# Patient Record
Sex: MALE | Race: BLACK OR AFRICAN AMERICAN | URBAN - METROPOLITAN AREA
[De-identification: names, ages, dates, MRNs, and addresses within clinical notes are randomized per-mention and may not be internally consistent; named-entity substitution may affect disease eponyms.]

---

## 2017-02-24 ENCOUNTER — INPATIENT (INPATIENT)
Facility: HOSPITAL | Age: 82
LOS: 5 days | Discharge: HOME | End: 2017-03-02
Attending: INTERNAL MEDICINE | Admitting: INTERNAL MEDICINE
Payer: MEDICARE

## 2017-02-24 VITALS
SYSTOLIC BLOOD PRESSURE: 128 MMHG | HEART RATE: 84 BPM | TEMPERATURE: 98 F | OXYGEN SATURATION: 100 % | RESPIRATION RATE: 18 BRPM | DIASTOLIC BLOOD PRESSURE: 83 MMHG

## 2017-02-24 DIAGNOSIS — R07.9 CHEST PAIN, UNSPECIFIED: ICD-10-CM

## 2017-02-24 LAB
ALBUMIN SERPL ELPH-MCNC: 3.7 G/DL — SIGNIFICANT CHANGE UP (ref 3.3–5)
ALP SERPL-CCNC: 63 U/L — SIGNIFICANT CHANGE UP (ref 40–120)
ALT FLD-CCNC: 12 U/L — SIGNIFICANT CHANGE UP (ref 4–41)
ANISOCYTOSIS BLD QL: SLIGHT — SIGNIFICANT CHANGE UP
APPEARANCE UR: CLEAR — SIGNIFICANT CHANGE UP
AST SERPL-CCNC: 20 U/L — SIGNIFICANT CHANGE UP (ref 4–40)
BASE EXCESS BLDV CALC-SCNC: -0.7 MMOL/L — SIGNIFICANT CHANGE UP
BASOPHILS # BLD AUTO: 0.03 K/UL — SIGNIFICANT CHANGE UP (ref 0–0.2)
BASOPHILS NFR BLD AUTO: 0.6 % — SIGNIFICANT CHANGE UP (ref 0–2)
BILIRUB SERPL-MCNC: 0.3 MG/DL — SIGNIFICANT CHANGE UP (ref 0.2–1.2)
BILIRUB UR-MCNC: NEGATIVE — SIGNIFICANT CHANGE UP
BLOOD GAS VENOUS - CREATININE: 2.31 MG/DL — HIGH (ref 0.5–1.3)
BLOOD UR QL VISUAL: NEGATIVE — SIGNIFICANT CHANGE UP
BUN SERPL-MCNC: 29 MG/DL — HIGH (ref 7–23)
CALCIUM SERPL-MCNC: 8.5 MG/DL — SIGNIFICANT CHANGE UP (ref 8.4–10.5)
CHLORIDE BLDV-SCNC: 109 MMOL/L — HIGH (ref 96–108)
CHLORIDE SERPL-SCNC: 103 MMOL/L — SIGNIFICANT CHANGE UP (ref 98–107)
CK MB BLD-MCNC: 1.3 — SIGNIFICANT CHANGE UP (ref 0–2.5)
CK MB BLD-MCNC: 3.31 NG/ML — SIGNIFICANT CHANGE UP (ref 1–6.6)
CK SERPL-CCNC: 250 U/L — HIGH (ref 30–200)
CO2 SERPL-SCNC: 18 MMOL/L — LOW (ref 22–31)
COLOR SPEC: SIGNIFICANT CHANGE UP
CREAT SERPL-MCNC: 2.22 MG/DL — HIGH (ref 0.5–1.3)
EOSINOPHIL # BLD AUTO: 0.29 K/UL — SIGNIFICANT CHANGE UP (ref 0–0.5)
EOSINOPHIL NFR BLD AUTO: 6.3 % — HIGH (ref 0–6)
GAS PNL BLDV: 139 MMOL/L — SIGNIFICANT CHANGE UP (ref 136–146)
GIANT PLATELETS BLD QL SMEAR: PRESENT — SIGNIFICANT CHANGE UP
GLUCOSE BLDV-MCNC: 110 — HIGH (ref 70–99)
GLUCOSE SERPL-MCNC: 114 MG/DL — HIGH (ref 70–99)
GLUCOSE UR-MCNC: NEGATIVE — SIGNIFICANT CHANGE UP
HCO3 BLDV-SCNC: 23 MMOL/L — SIGNIFICANT CHANGE UP (ref 20–27)
HCT VFR BLD CALC: 33.4 % — LOW (ref 39–50)
HCT VFR BLDV CALC: 35.5 % — LOW (ref 39–51)
HGB BLD-MCNC: 11.1 G/DL — LOW (ref 13–17)
HGB BLDV-MCNC: 11.5 G/DL — LOW (ref 13–17)
IMM GRANULOCYTES NFR BLD AUTO: 0 % — SIGNIFICANT CHANGE UP (ref 0–1.5)
KETONES UR-MCNC: NEGATIVE — SIGNIFICANT CHANGE UP
LACTATE BLDV-MCNC: 2 MMOL/L — SIGNIFICANT CHANGE UP (ref 0.5–2)
LEUKOCYTE ESTERASE UR-ACNC: HIGH
LYMPHOCYTES # BLD AUTO: 1.62 K/UL — SIGNIFICANT CHANGE UP (ref 1–3.3)
LYMPHOCYTES # BLD AUTO: 34.9 % — SIGNIFICANT CHANGE UP (ref 13–44)
MANUAL SMEAR VERIFICATION: YES — SIGNIFICANT CHANGE UP
MCHC RBC-ENTMCNC: 26.5 PG — LOW (ref 27–34)
MCHC RBC-ENTMCNC: 33.2 % — SIGNIFICANT CHANGE UP (ref 32–36)
MCV RBC AUTO: 79.7 FL — LOW (ref 80–100)
MICROCYTES BLD QL: SLIGHT — SIGNIFICANT CHANGE UP
MONOCYTES # BLD AUTO: 0.49 K/UL — SIGNIFICANT CHANGE UP (ref 0–0.9)
MONOCYTES NFR BLD AUTO: 10.6 % — SIGNIFICANT CHANGE UP (ref 2–14)
MUCOUS THREADS # UR AUTO: SIGNIFICANT CHANGE UP
NEUTROPHILS # BLD AUTO: 2.21 K/UL — SIGNIFICANT CHANGE UP (ref 1.8–7.4)
NEUTROPHILS NFR BLD AUTO: 47.6 % — SIGNIFICANT CHANGE UP (ref 43–77)
NITRITE UR-MCNC: NEGATIVE — SIGNIFICANT CHANGE UP
NON-SQ EPI CELLS # UR AUTO: <1 — SIGNIFICANT CHANGE UP
PCO2 BLDV: 45 MMHG — SIGNIFICANT CHANGE UP (ref 41–51)
PH BLDV: 7.35 PH — SIGNIFICANT CHANGE UP (ref 7.32–7.43)
PH UR: 7.5 — SIGNIFICANT CHANGE UP (ref 4.6–8)
PLATELET # BLD AUTO: 123 K/UL — LOW (ref 150–400)
PLATELET COUNT - ESTIMATE: SIGNIFICANT CHANGE UP
PMV BLD: 12 FL — SIGNIFICANT CHANGE UP (ref 7–13)
PO2 BLDV: 40 MMHG — SIGNIFICANT CHANGE UP (ref 35–40)
POTASSIUM BLDV-SCNC: 4 MMOL/L — SIGNIFICANT CHANGE UP (ref 3.4–4.5)
POTASSIUM SERPL-MCNC: 4.6 MMOL/L — SIGNIFICANT CHANGE UP (ref 3.5–5.3)
POTASSIUM SERPL-SCNC: 4.6 MMOL/L — SIGNIFICANT CHANGE UP (ref 3.5–5.3)
PROT SERPL-MCNC: 7 G/DL — SIGNIFICANT CHANGE UP (ref 6–8.3)
PROT UR-MCNC: 10 — SIGNIFICANT CHANGE UP
RBC # BLD: 4.19 M/UL — LOW (ref 4.2–5.8)
RBC # FLD: 15.3 % — HIGH (ref 10.3–14.5)
RBC CASTS # UR COMP ASSIST: SIGNIFICANT CHANGE UP (ref 0–?)
SAO2 % BLDV: 65.2 % — SIGNIFICANT CHANGE UP (ref 60–85)
SODIUM SERPL-SCNC: 141 MMOL/L — SIGNIFICANT CHANGE UP (ref 135–145)
SP GR SPEC: 1.01 — SIGNIFICANT CHANGE UP (ref 1–1.03)
SQUAMOUS # UR AUTO: SIGNIFICANT CHANGE UP
TROPONIN T SERPL-MCNC: < 0.06 NG/ML — SIGNIFICANT CHANGE UP (ref 0–0.06)
UROBILINOGEN FLD QL: NORMAL E.U. — SIGNIFICANT CHANGE UP (ref 0.1–0.2)
WBC # BLD: 4.64 K/UL — SIGNIFICANT CHANGE UP (ref 3.8–10.5)
WBC # FLD AUTO: 4.64 K/UL — SIGNIFICANT CHANGE UP (ref 3.8–10.5)
WBC UR QL: >50 — HIGH (ref 0–?)

## 2017-02-24 PROCEDURE — 71010: CPT | Mod: 26

## 2017-02-24 NOTE — H&P ADULT. - NEGATIVE GENERAL GENITOURINARY SYMPTOMS
no dysuria/no hematuria/no incontinence no hematuria/no dysuria/no renal colic/no flank pain L/no flank pain R

## 2017-02-24 NOTE — ED PROVIDER NOTE - ATTENDING CONTRIBUTION TO CARE
Dr. Braxton:  I have personally performed a face to face bedside history and physical examination of this patient. I have discussed the history, examination, review of systems, assessment and plan of management with the resident. I have reviewed the electronic medical record and amended it to reflect my history, review of systems, physical exam, assessment and plan.    84M h/o HTN, CVA, CKD, presents with substernal chest pain that started at rest around 1pm.  Pain has been intermittent since then, not exertionally related.  Denies fever/chills, sob, n/v/d, diaphoresis.    Exam;  - nad  - rrr   -ctab  - abd soft, ntnd  - +pedal edema bilaterally, LLE>RLE (chronic)    A/P  - CP, atypical, consider ACS  - cbc, cmp, trop  - ekg  - cxr

## 2017-02-24 NOTE — H&P ADULT. - NEGATIVE ENMT SYMPTOMS
no hearing difficulty no tinnitus/no vertigo/no nasal congestion/no nasal discharge/no ear pain/no hearing difficulty/no sinus symptoms

## 2017-02-24 NOTE — H&P ADULT. - NEGATIVE CARDIOVASCULAR SYMPTOMS
no claudication/no dyspnea on exertion/no orthopnea/no palpitations/no paroxysmal nocturnal dyspnea no orthopnea/no paroxysmal nocturnal dyspnea/no dyspnea on exertion/no palpitations

## 2017-02-24 NOTE — H&P ADULT. - EYES
detailed exam unable to open eyes fully, recent surgery conjunctiva clear/unable to open eyes fully, recent surgery

## 2017-02-24 NOTE — ED PROVIDER NOTE - PROGRESS NOTE DETAILS
Dr. Kory Brooks requests that patient be admitted for stress test and ACS r/o. Will admit to tele doc of the day.

## 2017-02-24 NOTE — ED ADULT TRIAGE NOTE - CHIEF COMPLAINT QUOTE
Pt from home c/o chest pain, denies SOB/dizziness.  As per son pt was diagnosed with UTI yesterday and placed on antibiotic.  20G to left hand placed by EMS, 162mg ASA given by EMS

## 2017-02-24 NOTE — ED ADULT NURSE NOTE - OBJECTIVE STATEMENT
Aida RN: Patient received to room 15, Aox4 and non ambulatory at baseline because of previous stroke. Left sided residual weakness at baseline.. C/o CP, and SOB. States he was dx with UTI yesterday. States he is supposed to be going for a pulmonary test soon. Deq7=514% on room air. Breathing even and nonlabored. HR = 73 NSR. IV 20 gauge started in right ac. labs drawn and sent. Report given to Carolyn CORRIGAN.

## 2017-02-24 NOTE — H&P ADULT. - RS GEN PE MLT RESP DETAILS PC
chest wall tenderness/clear to auscultation bilaterally/normal/respirations non-labored/good air movement/breath sounds equal/left sided/airway patent airway patent/respirations non-labored/normal/breath sounds equal/no wheezes/no rales/chest wall tenderness/left sided/no intercostal retractions/clear to auscultation bilaterally/no rhonchi/good air movement

## 2017-02-24 NOTE — H&P ADULT. - GASTROINTESTINAL DETAILS
no distention/nontender/soft/normal nontender/no distention/bowel sounds normal/no guarding/soft/no rebound tenderness/no rigidity/normal

## 2017-02-24 NOTE — H&P ADULT. - MUSCULOSKELETAL
details… detailed exam no joint swelling/normal/normal strength/ROM intact no joint warmth/no joint swelling/no joint erythema/calf tenderness

## 2017-02-24 NOTE — H&P ADULT. - ASSESSMENT
85 y/o M with PMH CKD, HTN, CVA presenting with chest pain x 2 days. R/O ACS 85 y/o M with PMH of CVA, CKD, HTN, GERD, BPH, Intractable Hiccups presented with the complaint of left sided chest pain. R/o ACS

## 2017-02-24 NOTE — ED PROVIDER NOTE - PMH
Anxiety    Back Pain    CKD (chronic kidney disease)    CVA (cerebral infarction)    GERD (Gastroesophageal Reflux Disease)    Hypertension    Intractable Hiccups

## 2017-02-24 NOTE — H&P ADULT. - NEGATIVE MUSCULOSKELETAL SYMPTOMS
no joint swelling/no arthralgia/no arthritis/no arm pain L/no arm pain R no muscle cramps/no arthralgia/no joint swelling/no stiffness/no neck pain/no arthritis/no muscle weakness/no myalgia

## 2017-02-24 NOTE — H&P ADULT. - NEGATIVE GENERAL SYMPTOMS
no fever/no sweating/no chills/no weight loss/no anorexia no weight gain/no sweating/no chills/no anorexia/no fever/no fatigue/no malaise/no weight loss

## 2017-02-24 NOTE — H&P ADULT. - NEGATIVE NEUROLOGICAL SYMPTOMS
no syncope/no loss of consciousness/no weakness/no transient paralysis/no hemiparesis no vertigo/no generalized seizures/no confusion/no paresthesias/no weakness/no syncope/no transient paralysis/no focal seizures/no loss of consciousness/no loss of sensation/no difficulty walking/no tremors/no headache/no hemiparesis

## 2017-02-24 NOTE — H&P ADULT. - NEGATIVE OPHTHALMOLOGIC SYMPTOMS
no diplopia/no photophobia/no blurred vision L/no blurred vision R no blurred vision R/no blurred vision L/no discharge L/no lacrimation L/no lacrimation R/no diplopia/no photophobia/no discharge R

## 2017-02-24 NOTE — H&P ADULT. - PROBLEM SELECTOR PLAN 2
Patient was started on Bactrim 2/23 by his urologist outpatient   UA revealed Large leuks, negative nitrites   Will continue with Bactrim BID for 5 days   Urine cultures ordered

## 2017-02-24 NOTE — H&P ADULT. - HISTORY OF PRESENT ILLNESS
85 y/o M with PMH CKD, HTN, CVA presenting with chest pain x 2 days. Pt states he was sitting in his wheelchair yesterday when he felt 7/10,left sided chest pressure that did not radiate. He states the pain lasted 10-15 minutes and was intermittent throughout the day. Overnight, his chest pain became acutely worse and he decided to come to the ED. He states the pain is not pleuritic, exertional, or positional. He denies SOB, JAMESON, diaphoresis, fever, chills, N/V/D, palpitations. 85 y/o M with PMH of CVA, CKD, HTN, GERD, BPH, Intractable Hiccups presented with the complaint of left sided chest pain. As per the patient he developed left sided chest pain on Thursday while he was resting. The chest pain came on gradually, located on the left side, characterized as a pressure like sensation, without any aggravating or alleviating factors, with radiation of the chest pain to the left shoulder, with a severity of 4/10. Patient stated that he had chest pain before but this was not that severe. Patient denied any fevers, SOB, N/V/D/C, abdominal pain, cough, dysuria, melena, hematochezia, recent travel, sick contact, dysuria, pleuritic or positional chest pain. Of note patient was seen by his urologist on 2/23 and was diagnosed with a UTI and was started on antibiotics.     On ED admission EKG revealed NSR at a rate of 84, CE x 1: Negative, H&H: 11.1/33.4, BUN/Cr: 29/2.22, Gluc: 114, UA: Large leuks, negative nitrites. Prelim CXR: No urgent/emergent findings. When examined patient is resting in the stretcher and denied any current CP or SOB.

## 2017-02-24 NOTE — H&P ADULT. - NEUROLOGICAL DETAILS
alert and oriented x 3/normal strength alert and oriented x 3/sensation intact/responds to verbal commands

## 2017-02-24 NOTE — H&P ADULT. - NEGATIVE GASTROINTESTINAL SYMPTOMS
no vomiting/no constipation/no nausea/no melena/no diarrhea/no abdominal pain no hematochezia/no constipation/no vomiting/no melena/no nausea/no abdominal pain/no diarrhea/no change in bowel habits

## 2017-02-24 NOTE — ED PROVIDER NOTE - FAMILY HISTORY
Father  Still living? Unknown  Family history of cancer, Age at diagnosis: Age Unknown     Sibling  Still living? Unknown  Family history of cancer, Age at diagnosis: Age Unknown

## 2017-02-24 NOTE — ED PROVIDER NOTE - MEDICAL DECISION MAKING DETAILS
84 yom PMH CVA, CKD, and HTN p/w nonexertional substernal CP. Pt reports that CP has almost completely resolved. EKG not significantly changed from prior. Will obtain Sandra and CXR. Will call pt's PCP/Cardiologist Dr. Kory Brooks.

## 2017-02-24 NOTE — ED PROVIDER NOTE - OBJECTIVE STATEMENT
Pt is an 84 yom PMH CVA, CKD, and HTN p/w HTN. Pt was diagnosed with a UTI yesterday and was started on antibiotics. Pt is an 84 yom PMH CVA, CKD, and HTN p/w CP. The pain started around 1 pm while he was sitting in his wheelchair, was located on the left side of the chest, and radiated to his L upper arm. No SOB or diaphoresis. Pt was seen by his Urologist yesterday diagnosed with a UTI yesterday and was started on antibiotics.    PCP/Cardiologist: Dr. Kory Brooks Pt is an 84 yom PMH CVA, CKD, and HTN p/w CP. The pain started around 1 pm while he was sitting in his wheelchair, was located on the left side of the chest, and radiated to his L upper arm. No SOB or diaphoresis. Pt reports that his CP has almost completely resolved currently.    Pt was seen by his Urologist yesterday diagnosed with a UTI yesterday and was started on antibiotics.    PCP/Cardiologist: Dr. Kory Brooks

## 2017-02-25 DIAGNOSIS — I10 ESSENTIAL (PRIMARY) HYPERTENSION: ICD-10-CM

## 2017-02-25 DIAGNOSIS — R07.89 OTHER CHEST PAIN: ICD-10-CM

## 2017-02-25 DIAGNOSIS — E78.5 HYPERLIPIDEMIA, UNSPECIFIED: ICD-10-CM

## 2017-02-25 DIAGNOSIS — Z41.8 ENCOUNTER FOR OTHER PROCEDURES FOR PURPOSES OTHER THAN REMEDYING HEALTH STATE: ICD-10-CM

## 2017-02-25 DIAGNOSIS — N39.0 URINARY TRACT INFECTION, SITE NOT SPECIFIED: ICD-10-CM

## 2017-02-25 DIAGNOSIS — N40.0 BENIGN PROSTATIC HYPERPLASIA WITHOUT LOWER URINARY TRACT SYMPTOMS: ICD-10-CM

## 2017-02-25 DIAGNOSIS — H40.9 UNSPECIFIED GLAUCOMA: ICD-10-CM

## 2017-02-25 LAB
ALBUMIN SERPL ELPH-MCNC: 3.6 G/DL — SIGNIFICANT CHANGE UP (ref 3.3–5)
ALP SERPL-CCNC: 59 U/L — SIGNIFICANT CHANGE UP (ref 40–120)
ALT FLD-CCNC: 15 U/L — SIGNIFICANT CHANGE UP (ref 4–41)
AST SERPL-CCNC: 19 U/L — SIGNIFICANT CHANGE UP (ref 4–40)
BILIRUB SERPL-MCNC: 0.6 MG/DL — SIGNIFICANT CHANGE UP (ref 0.2–1.2)
BUN SERPL-MCNC: 22 MG/DL — SIGNIFICANT CHANGE UP (ref 7–23)
CALCIUM SERPL-MCNC: 8.8 MG/DL — SIGNIFICANT CHANGE UP (ref 8.4–10.5)
CHLORIDE SERPL-SCNC: 107 MMOL/L — SIGNIFICANT CHANGE UP (ref 98–107)
CHOLEST SERPL-MCNC: 197 MG/DL — SIGNIFICANT CHANGE UP (ref 120–199)
CK MB BLD-MCNC: 1.4 — SIGNIFICANT CHANGE UP (ref 0–2.5)
CK MB BLD-MCNC: 3.77 NG/ML — SIGNIFICANT CHANGE UP (ref 1–6.6)
CK SERPL-CCNC: 270 U/L — HIGH (ref 30–200)
CO2 SERPL-SCNC: 20 MMOL/L — LOW (ref 22–31)
CREAT SERPL-MCNC: 2.05 MG/DL — HIGH (ref 0.5–1.3)
D DIMER BLD IA.RAPID-MCNC: 597 NG/ML — SIGNIFICANT CHANGE UP
GLUCOSE SERPL-MCNC: 74 MG/DL — SIGNIFICANT CHANGE UP (ref 70–99)
HBA1C BLD-MCNC: 6.1 % — HIGH (ref 4–5.6)
HCT VFR BLD CALC: 31.5 % — LOW (ref 39–50)
HDLC SERPL-MCNC: 69 MG/DL — HIGH (ref 35–55)
HGB BLD-MCNC: 10.6 G/DL — LOW (ref 13–17)
LIPID PNL WITH DIRECT LDL SERPL: 126 MG/DL — SIGNIFICANT CHANGE UP
MAGNESIUM SERPL-MCNC: 2.1 MG/DL — SIGNIFICANT CHANGE UP (ref 1.6–2.6)
MCHC RBC-ENTMCNC: 26.5 PG — LOW (ref 27–34)
MCHC RBC-ENTMCNC: 33.7 % — SIGNIFICANT CHANGE UP (ref 32–36)
MCV RBC AUTO: 78.8 FL — LOW (ref 80–100)
PHOSPHATE SERPL-MCNC: 2.9 MG/DL — SIGNIFICANT CHANGE UP (ref 2.5–4.5)
PLATELET # BLD AUTO: 115 K/UL — LOW (ref 150–400)
PMV BLD: 11.5 FL — SIGNIFICANT CHANGE UP (ref 7–13)
POTASSIUM SERPL-MCNC: 4.3 MMOL/L — SIGNIFICANT CHANGE UP (ref 3.5–5.3)
POTASSIUM SERPL-SCNC: 4.3 MMOL/L — SIGNIFICANT CHANGE UP (ref 3.5–5.3)
PROT SERPL-MCNC: 6.7 G/DL — SIGNIFICANT CHANGE UP (ref 6–8.3)
RBC # BLD: 4 M/UL — LOW (ref 4.2–5.8)
RBC # FLD: 15.3 % — HIGH (ref 10.3–14.5)
SODIUM SERPL-SCNC: 140 MMOL/L — SIGNIFICANT CHANGE UP (ref 135–145)
TRIGL SERPL-MCNC: 48 MG/DL — SIGNIFICANT CHANGE UP (ref 10–149)
TROPONIN T SERPL-MCNC: < 0.06 NG/ML — SIGNIFICANT CHANGE UP (ref 0–0.06)
TSH SERPL-MCNC: 1.71 UIU/ML — SIGNIFICANT CHANGE UP (ref 0.27–4.2)
WBC # BLD: 4.93 K/UL — SIGNIFICANT CHANGE UP (ref 3.8–10.5)
WBC # FLD AUTO: 4.93 K/UL — SIGNIFICANT CHANGE UP (ref 3.8–10.5)

## 2017-02-25 RX ORDER — PANTOPRAZOLE SODIUM 20 MG/1
40 TABLET, DELAYED RELEASE ORAL
Qty: 0 | Refills: 0 | Status: DISCONTINUED | OUTPATIENT
Start: 2017-02-25 | End: 2017-03-02

## 2017-02-25 RX ORDER — SUCRALFATE 1 G
1 TABLET ORAL DAILY
Qty: 0 | Refills: 0 | Status: DISCONTINUED | OUTPATIENT
Start: 2017-02-25 | End: 2017-03-02

## 2017-02-25 RX ORDER — FUROSEMIDE 40 MG
40 TABLET ORAL DAILY
Qty: 0 | Refills: 0 | Status: DISCONTINUED | OUTPATIENT
Start: 2017-02-25 | End: 2017-02-28

## 2017-02-25 RX ORDER — LATANOPROST 0.05 MG/ML
1 SOLUTION/ DROPS OPHTHALMIC; TOPICAL AT BEDTIME
Qty: 0 | Refills: 0 | Status: DISCONTINUED | OUTPATIENT
Start: 2017-02-25 | End: 2017-03-02

## 2017-02-25 RX ORDER — INFLUENZA VIRUS VACCINE 15; 15; 15; 15 UG/.5ML; UG/.5ML; UG/.5ML; UG/.5ML
0.5 SUSPENSION INTRAMUSCULAR ONCE
Qty: 0 | Refills: 0 | Status: DISCONTINUED | OUTPATIENT
Start: 2017-02-25 | End: 2017-03-02

## 2017-02-25 RX ORDER — ASPIRIN/CALCIUM CARB/MAGNESIUM 324 MG
81 TABLET ORAL DAILY
Qty: 0 | Refills: 0 | Status: DISCONTINUED | OUTPATIENT
Start: 2017-02-25 | End: 2017-03-02

## 2017-02-25 RX ORDER — TAMSULOSIN HYDROCHLORIDE 0.4 MG/1
0.4 CAPSULE ORAL AT BEDTIME
Qty: 0 | Refills: 0 | Status: DISCONTINUED | OUTPATIENT
Start: 2017-02-25 | End: 2017-03-02

## 2017-02-25 RX ORDER — HEPARIN SODIUM 5000 [USP'U]/ML
5000 INJECTION INTRAVENOUS; SUBCUTANEOUS EVERY 12 HOURS
Qty: 0 | Refills: 0 | Status: DISCONTINUED | OUTPATIENT
Start: 2017-02-25 | End: 2017-03-02

## 2017-02-25 RX ORDER — CEFTRIAXONE 500 MG/1
1 INJECTION, POWDER, FOR SOLUTION INTRAMUSCULAR; INTRAVENOUS EVERY 24 HOURS
Qty: 0 | Refills: 0 | Status: COMPLETED | OUTPATIENT
Start: 2017-02-25 | End: 2017-02-27

## 2017-02-25 RX ORDER — PREGABALIN 225 MG/1
100 CAPSULE ORAL DAILY
Qty: 0 | Refills: 0 | Status: DISCONTINUED | OUTPATIENT
Start: 2017-02-25 | End: 2017-03-02

## 2017-02-25 RX ORDER — FINASTERIDE 5 MG/1
5 TABLET, FILM COATED ORAL DAILY
Qty: 0 | Refills: 0 | Status: DISCONTINUED | OUTPATIENT
Start: 2017-02-25 | End: 2017-03-02

## 2017-02-25 RX ORDER — ATORVASTATIN CALCIUM 80 MG/1
20 TABLET, FILM COATED ORAL AT BEDTIME
Qty: 0 | Refills: 0 | Status: DISCONTINUED | OUTPATIENT
Start: 2017-02-25 | End: 2017-03-02

## 2017-02-25 RX ADMIN — PANTOPRAZOLE SODIUM 40 MILLIGRAM(S): 20 TABLET, DELAYED RELEASE ORAL at 06:05

## 2017-02-25 RX ADMIN — ATORVASTATIN CALCIUM 20 MILLIGRAM(S): 80 TABLET, FILM COATED ORAL at 21:37

## 2017-02-25 RX ADMIN — Medication 1 GRAM(S): at 12:15

## 2017-02-25 RX ADMIN — HEPARIN SODIUM 5000 UNIT(S): 5000 INJECTION INTRAVENOUS; SUBCUTANEOUS at 06:05

## 2017-02-25 RX ADMIN — FINASTERIDE 5 MILLIGRAM(S): 5 TABLET, FILM COATED ORAL at 12:15

## 2017-02-25 RX ADMIN — Medication 1 TABLET(S): at 06:05

## 2017-02-25 RX ADMIN — Medication 40 MILLIGRAM(S): at 06:05

## 2017-02-25 RX ADMIN — Medication 30 MILLILITER(S): at 01:46

## 2017-02-25 RX ADMIN — TAMSULOSIN HYDROCHLORIDE 0.4 MILLIGRAM(S): 0.4 CAPSULE ORAL at 21:37

## 2017-02-25 RX ADMIN — LATANOPROST 1 DROP(S): 0.05 SOLUTION/ DROPS OPHTHALMIC; TOPICAL at 22:24

## 2017-02-25 RX ADMIN — PREGABALIN 100 MICROGRAM(S): 225 CAPSULE ORAL at 12:15

## 2017-02-25 RX ADMIN — HEPARIN SODIUM 5000 UNIT(S): 5000 INJECTION INTRAVENOUS; SUBCUTANEOUS at 18:12

## 2017-02-25 RX ADMIN — Medication 1 TABLET(S): at 18:12

## 2017-02-25 RX ADMIN — Medication 81 MILLIGRAM(S): at 12:15

## 2017-02-25 RX ADMIN — CEFTRIAXONE 100 GRAM(S): 500 INJECTION, POWDER, FOR SOLUTION INTRAMUSCULAR; INTRAVENOUS at 18:35

## 2017-02-25 NOTE — SWALLOW BEDSIDE ASSESSMENT ADULT - SWALLOW EVAL: RECOMMENDED FEEDING/EATING TECHNIQUES
alternate food with liquid/allow for swallow between intakes/position upright (90 degrees)/no straws/check mouth frequently for oral residue/pocketing/small sips/bites/maintain upright posture during/after eating for 30 mins/oral hygiene

## 2017-02-25 NOTE — SWALLOW BEDSIDE ASSESSMENT ADULT - SWALLOW EVAL: DIAGNOSIS
1- pt demonstrated functional oral management with puree, honey thick liquid, nectar thick liquid and thin liquid textures marked by adequate bolus collection, transfer and transport. 2- pt demonstrated mild oral dysphagia with solid textures marked by decreased mastication abilities resulting in delayed oral preparation/AP transit. 3- pt demonstrated mild pharyngeal dysphagia with regular/solid, puree, honey thick liquid, nectar thick liquid and thin liquid textures marked by mildly delayed swallow trigger and mildly reduced hyolaryngeal excursion without any overt s/s of penetration/aspiration noted.

## 2017-02-25 NOTE — SWALLOW BEDSIDE ASSESSMENT ADULT - COMMENTS
Pt was alert and cooperative for a clinical assessment of swallow function this PM. Pt denies any history of dysphagia at the time of today's consultation. Chart review revealed a CXR completed on 2/24/2017 with findings of "no focal consolidations".

## 2017-02-26 LAB
BACTERIA UR CULT: SIGNIFICANT CHANGE UP
BUN SERPL-MCNC: 20 MG/DL — SIGNIFICANT CHANGE UP (ref 7–23)
CALCIUM SERPL-MCNC: 9.3 MG/DL — SIGNIFICANT CHANGE UP (ref 8.4–10.5)
CHLORIDE SERPL-SCNC: 106 MMOL/L — SIGNIFICANT CHANGE UP (ref 98–107)
CO2 SERPL-SCNC: 20 MMOL/L — LOW (ref 22–31)
CREAT SERPL-MCNC: 2.31 MG/DL — HIGH (ref 0.5–1.3)
GLUCOSE SERPL-MCNC: 93 MG/DL — SIGNIFICANT CHANGE UP (ref 70–99)
HCT VFR BLD CALC: 33.6 % — LOW (ref 39–50)
HGB BLD-MCNC: 11.5 G/DL — LOW (ref 13–17)
MCHC RBC-ENTMCNC: 27.3 PG — SIGNIFICANT CHANGE UP (ref 27–34)
MCHC RBC-ENTMCNC: 34.2 % — SIGNIFICANT CHANGE UP (ref 32–36)
MCV RBC AUTO: 79.8 FL — LOW (ref 80–100)
PLATELET # BLD AUTO: 128 K/UL — LOW (ref 150–400)
PMV BLD: 11.5 FL — SIGNIFICANT CHANGE UP (ref 7–13)
POTASSIUM SERPL-MCNC: 4.1 MMOL/L — SIGNIFICANT CHANGE UP (ref 3.5–5.3)
POTASSIUM SERPL-SCNC: 4.1 MMOL/L — SIGNIFICANT CHANGE UP (ref 3.5–5.3)
RBC # BLD: 4.21 M/UL — SIGNIFICANT CHANGE UP (ref 4.2–5.8)
RBC # FLD: 15.3 % — HIGH (ref 10.3–14.5)
SODIUM SERPL-SCNC: 141 MMOL/L — SIGNIFICANT CHANGE UP (ref 135–145)
SPECIMEN SOURCE: SIGNIFICANT CHANGE UP
WBC # BLD: 5.08 K/UL — SIGNIFICANT CHANGE UP (ref 3.8–10.5)
WBC # FLD AUTO: 5.08 K/UL — SIGNIFICANT CHANGE UP (ref 3.8–10.5)

## 2017-02-26 RX ADMIN — Medication 81 MILLIGRAM(S): at 13:58

## 2017-02-26 RX ADMIN — TAMSULOSIN HYDROCHLORIDE 0.4 MILLIGRAM(S): 0.4 CAPSULE ORAL at 22:15

## 2017-02-26 RX ADMIN — Medication 40 MILLIGRAM(S): at 05:38

## 2017-02-26 RX ADMIN — PANTOPRAZOLE SODIUM 40 MILLIGRAM(S): 20 TABLET, DELAYED RELEASE ORAL at 05:38

## 2017-02-26 RX ADMIN — Medication 1 GRAM(S): at 13:58

## 2017-02-26 RX ADMIN — CEFTRIAXONE 100 GRAM(S): 500 INJECTION, POWDER, FOR SOLUTION INTRAMUSCULAR; INTRAVENOUS at 18:53

## 2017-02-26 RX ADMIN — ATORVASTATIN CALCIUM 20 MILLIGRAM(S): 80 TABLET, FILM COATED ORAL at 22:16

## 2017-02-26 RX ADMIN — FINASTERIDE 5 MILLIGRAM(S): 5 TABLET, FILM COATED ORAL at 13:58

## 2017-02-26 RX ADMIN — HEPARIN SODIUM 5000 UNIT(S): 5000 INJECTION INTRAVENOUS; SUBCUTANEOUS at 18:54

## 2017-02-26 RX ADMIN — LATANOPROST 1 DROP(S): 0.05 SOLUTION/ DROPS OPHTHALMIC; TOPICAL at 22:15

## 2017-02-26 RX ADMIN — HEPARIN SODIUM 5000 UNIT(S): 5000 INJECTION INTRAVENOUS; SUBCUTANEOUS at 05:38

## 2017-02-26 RX ADMIN — PREGABALIN 100 MICROGRAM(S): 225 CAPSULE ORAL at 13:58

## 2017-02-27 ENCOUNTER — TRANSCRIPTION ENCOUNTER (OUTPATIENT)
Age: 82
End: 2017-02-27

## 2017-02-27 LAB
BUN SERPL-MCNC: 23 MG/DL — SIGNIFICANT CHANGE UP (ref 7–23)
CALCIUM SERPL-MCNC: 9.5 MG/DL — SIGNIFICANT CHANGE UP (ref 8.4–10.5)
CHLORIDE SERPL-SCNC: 103 MMOL/L — SIGNIFICANT CHANGE UP (ref 98–107)
CO2 SERPL-SCNC: 22 MMOL/L — SIGNIFICANT CHANGE UP (ref 22–31)
CREAT ?TM UR-MCNC: 113.57 MG/DL — SIGNIFICANT CHANGE UP
CREAT SERPL-MCNC: 2.56 MG/DL — HIGH (ref 0.5–1.3)
GLUCOSE SERPL-MCNC: 76 MG/DL — SIGNIFICANT CHANGE UP (ref 70–99)
HCT VFR BLD CALC: 36 % — LOW (ref 39–50)
HGB BLD-MCNC: 12.2 G/DL — LOW (ref 13–17)
MCHC RBC-ENTMCNC: 26.7 PG — LOW (ref 27–34)
MCHC RBC-ENTMCNC: 33.9 % — SIGNIFICANT CHANGE UP (ref 32–36)
MCV RBC AUTO: 78.8 FL — LOW (ref 80–100)
PLATELET # BLD AUTO: 131 K/UL — LOW (ref 150–400)
PMV BLD: 11.6 FL — SIGNIFICANT CHANGE UP (ref 7–13)
POTASSIUM SERPL-MCNC: 4.7 MMOL/L — SIGNIFICANT CHANGE UP (ref 3.5–5.3)
POTASSIUM SERPL-SCNC: 4.7 MMOL/L — SIGNIFICANT CHANGE UP (ref 3.5–5.3)
RBC # BLD: 4.57 M/UL — SIGNIFICANT CHANGE UP (ref 4.2–5.8)
RBC # FLD: 15.3 % — HIGH (ref 10.3–14.5)
SODIUM SERPL-SCNC: 142 MMOL/L — SIGNIFICANT CHANGE UP (ref 135–145)
UUN UR-MCNC: 338.5 MG/DL — SIGNIFICANT CHANGE UP
WBC # BLD: 6.11 K/UL — SIGNIFICANT CHANGE UP (ref 3.8–10.5)
WBC # FLD AUTO: 6.11 K/UL — SIGNIFICANT CHANGE UP (ref 3.8–10.5)

## 2017-02-27 PROCEDURE — 93306 TTE W/DOPPLER COMPLETE: CPT | Mod: 26

## 2017-02-27 RX ADMIN — PREGABALIN 100 MICROGRAM(S): 225 CAPSULE ORAL at 12:38

## 2017-02-27 RX ADMIN — TAMSULOSIN HYDROCHLORIDE 0.4 MILLIGRAM(S): 0.4 CAPSULE ORAL at 21:30

## 2017-02-27 RX ADMIN — Medication 1 GRAM(S): at 12:39

## 2017-02-27 RX ADMIN — PANTOPRAZOLE SODIUM 40 MILLIGRAM(S): 20 TABLET, DELAYED RELEASE ORAL at 06:31

## 2017-02-27 RX ADMIN — CEFTRIAXONE 100 GRAM(S): 500 INJECTION, POWDER, FOR SOLUTION INTRAMUSCULAR; INTRAVENOUS at 18:48

## 2017-02-27 RX ADMIN — HEPARIN SODIUM 5000 UNIT(S): 5000 INJECTION INTRAVENOUS; SUBCUTANEOUS at 06:31

## 2017-02-27 RX ADMIN — Medication 40 MILLIGRAM(S): at 06:31

## 2017-02-27 RX ADMIN — Medication 81 MILLIGRAM(S): at 12:38

## 2017-02-27 RX ADMIN — HEPARIN SODIUM 5000 UNIT(S): 5000 INJECTION INTRAVENOUS; SUBCUTANEOUS at 18:48

## 2017-02-27 RX ADMIN — ATORVASTATIN CALCIUM 20 MILLIGRAM(S): 80 TABLET, FILM COATED ORAL at 21:30

## 2017-02-27 RX ADMIN — FINASTERIDE 5 MILLIGRAM(S): 5 TABLET, FILM COATED ORAL at 12:38

## 2017-02-27 RX ADMIN — LATANOPROST 1 DROP(S): 0.05 SOLUTION/ DROPS OPHTHALMIC; TOPICAL at 21:30

## 2017-02-28 LAB
BUN SERPL-MCNC: 34 MG/DL — HIGH (ref 7–23)
CALCIUM SERPL-MCNC: 9.3 MG/DL — SIGNIFICANT CHANGE UP (ref 8.4–10.5)
CHLORIDE SERPL-SCNC: 104 MMOL/L — SIGNIFICANT CHANGE UP (ref 98–107)
CO2 SERPL-SCNC: 21 MMOL/L — LOW (ref 22–31)
CREAT SERPL-MCNC: 3.57 MG/DL — HIGH (ref 0.5–1.3)
EOSINOPHIL NFR URNS MANUAL: NEGATIVE — SIGNIFICANT CHANGE UP (ref 0–0)
GLUCOSE SERPL-MCNC: 100 MG/DL — HIGH (ref 70–99)
HCT VFR BLD CALC: 37.3 % — LOW (ref 39–50)
HGB BLD-MCNC: 12.8 G/DL — LOW (ref 13–17)
MAGNESIUM SERPL-MCNC: 2.2 MG/DL — SIGNIFICANT CHANGE UP (ref 1.6–2.6)
MCHC RBC-ENTMCNC: 27.4 PG — SIGNIFICANT CHANGE UP (ref 27–34)
MCHC RBC-ENTMCNC: 34.3 % — SIGNIFICANT CHANGE UP (ref 32–36)
MCV RBC AUTO: 79.7 FL — LOW (ref 80–100)
PHOSPHATE SERPL-MCNC: 3.7 MG/DL — SIGNIFICANT CHANGE UP (ref 2.5–4.5)
PLATELET # BLD AUTO: 147 K/UL — LOW (ref 150–400)
PMV BLD: 11.9 FL — SIGNIFICANT CHANGE UP (ref 7–13)
POTASSIUM SERPL-MCNC: 4.3 MMOL/L — SIGNIFICANT CHANGE UP (ref 3.5–5.3)
POTASSIUM SERPL-SCNC: 4.3 MMOL/L — SIGNIFICANT CHANGE UP (ref 3.5–5.3)
RBC # BLD: 4.68 M/UL — SIGNIFICANT CHANGE UP (ref 4.2–5.8)
RBC # FLD: 15.5 % — HIGH (ref 10.3–14.5)
SODIUM SERPL-SCNC: 142 MMOL/L — SIGNIFICANT CHANGE UP (ref 135–145)
WBC # BLD: 6.31 K/UL — SIGNIFICANT CHANGE UP (ref 3.8–10.5)
WBC # FLD AUTO: 6.31 K/UL — SIGNIFICANT CHANGE UP (ref 3.8–10.5)

## 2017-02-28 PROCEDURE — 76775 US EXAM ABDO BACK WALL LIM: CPT | Mod: 26

## 2017-02-28 RX ORDER — SODIUM CHLORIDE 9 MG/ML
1000 INJECTION INTRAMUSCULAR; INTRAVENOUS; SUBCUTANEOUS
Qty: 0 | Refills: 0 | Status: DISCONTINUED | OUTPATIENT
Start: 2017-02-28 | End: 2017-03-02

## 2017-02-28 RX ADMIN — HEPARIN SODIUM 5000 UNIT(S): 5000 INJECTION INTRAVENOUS; SUBCUTANEOUS at 17:06

## 2017-02-28 RX ADMIN — PREGABALIN 100 MICROGRAM(S): 225 CAPSULE ORAL at 11:22

## 2017-02-28 RX ADMIN — ATORVASTATIN CALCIUM 20 MILLIGRAM(S): 80 TABLET, FILM COATED ORAL at 21:45

## 2017-02-28 RX ADMIN — SODIUM CHLORIDE 50 MILLILITER(S): 9 INJECTION INTRAMUSCULAR; INTRAVENOUS; SUBCUTANEOUS at 13:03

## 2017-02-28 RX ADMIN — Medication 1 GRAM(S): at 11:22

## 2017-02-28 RX ADMIN — TAMSULOSIN HYDROCHLORIDE 0.4 MILLIGRAM(S): 0.4 CAPSULE ORAL at 21:46

## 2017-02-28 RX ADMIN — HEPARIN SODIUM 5000 UNIT(S): 5000 INJECTION INTRAVENOUS; SUBCUTANEOUS at 05:05

## 2017-02-28 RX ADMIN — PANTOPRAZOLE SODIUM 40 MILLIGRAM(S): 20 TABLET, DELAYED RELEASE ORAL at 05:05

## 2017-02-28 RX ADMIN — FINASTERIDE 5 MILLIGRAM(S): 5 TABLET, FILM COATED ORAL at 11:22

## 2017-02-28 RX ADMIN — LATANOPROST 1 DROP(S): 0.05 SOLUTION/ DROPS OPHTHALMIC; TOPICAL at 21:46

## 2017-02-28 RX ADMIN — Medication 81 MILLIGRAM(S): at 11:23

## 2017-02-28 RX ADMIN — Medication 40 MILLIGRAM(S): at 05:05

## 2017-02-28 NOTE — DISCHARGE NOTE ADULT - PLAN OF CARE
prevent chest pain - f/u with your cardiologist in 1 - 2 weeks prevention maintain good hygiene keep the odonnell in place at this time f/u with urologist in 1 week for possible removal of the odonnell with a triel of void.

## 2017-02-28 NOTE — DISCHARGE NOTE ADULT - HOSPITAL COURSE
83 y/o M with PMH of CVA, CKD, HTN, GERD, BPH, Intractable Hiccups presented with the complaint of left sided chest pain. R/o ACS     Hospital Course:  EKG: NSR at a rate of 84   CE x 2: Negative   H&H: 11.1/33.4  BUN/Cr: 29/2.22  Gluc: 114    A1c 6.1  UA: Large leuks, negative nitrites - completed 3 day course of ceftriaxone   Prelim CXR: No urgent/emergent findings.   D-dimer 597-no clinical evidence of PE. As per cards get NST and if negative can DC.  As per Dr. Montalvo, can cancel NST and check Echo, and get PT cs  2/26: Cardio: PT c/s, check echo, diastolic dysfunction  2/27: Renal: check urine eos, Cr, Na, f/u cardio, monitor BMP  2/27 TTE: 1. Calcified trileaflet aortic valve with normal opening.  Mild-moderate aortic regurgitation.  2. Increased relative wall thickness with normal left  ventricular mass index, consistent with concentric left  ventricular remodeling.  3. Normal left ventricular systolic function. No segmental  wall motion abnormalities.  4. Normal right ventricular size and function. 83 y/o M with PMH of CVA, CKD, HTN, GERD, BPH, Intractable Hiccups presented with the complaint of left sided chest pain. R/o ACS     Hospital Course:  EKG: NSR at a rate of 84   CE x 2: Negative   H&H: 11.1/33.4  BUN/Cr: 29/2.22  Gluc: 114    A1c 6.1  UA: Large leuks, negative nitrites - completed 3 day course of ceftriaxone   Prelim CXR: No urgent/emergent findings.   D-dimer 597-no clinical evidence of PE. As per cards get NST and if negative can DC.  As per Dr. Montalvo, can cancel NST and check Echo, and get PT cs  2/26: Cardio: PT c/s, check echo, diastolic dysfunction  2/27: Renal: check urine eos, Cr, Na, f/u cardio, monitor BMP  2/27 TTE: 1. Calcified trileaflet aortic valve with normal opening.  Mild-moderate aortic regurgitation.  2. Increased relative wall thickness with normal left  ventricular mass index, consistent with concentric left  ventricular remodeling.  3. Normal left ventricular systolic function. No segmental  wall motion abnormalities.  4. Normal right ventricular size and function.  2/28: Renal: d/c lasix, NS @ 50cc/hr x 20hrs, monitor BMP, f/u cardio  3/1: Renal us: Evidence of nonobstructing right lower pole kidney stones. Large prostate with evidence of urinary bladder outlet obstruction.  3/1: Renal: urinary retention s/p odonnell, monitor BMP, cont to hold lasix, f/u cardio, urology eval  3/1: c/w flomax/proscar, trend Cr, c/w Odonnell, outaptient TOV    pt will be going home with odonnell and f/u with urology as out patient fro a triel of void

## 2017-02-28 NOTE — DISCHARGE NOTE ADULT - PATIENT PORTAL LINK FT
“You can access the [facility] Patient Portal, offered by [facility], by registering with the following website: [email or web address]”

## 2017-02-28 NOTE — DISCHARGE NOTE ADULT - MEDICATION SUMMARY - MEDICATIONS TO TAKE
I will START or STAY ON the medications listed below when I get home from the hospital:    dutasteride 0.5 mg oral capsule  -- 1 cap(s) by mouth once a day  -- Indication: For Need for prophylactic measure    aspirin 81 mg oral tablet, chewable  -- 1 tab(s) by mouth once a day  -- Indication: For HLD (hyperlipidemia)    tamsulosin 0.4 mg oral capsule  -- 1 cap(s) by mouth once a day (at bedtime)  -- Indication: For BPH (benign prostatic hyperplasia)    atorvastatin 20 mg oral tablet  -- 1 tab(s) by mouth once a day (at bedtime)  -- Indication: For HLD (hyperlipidemia)    sucralfate 1 g oral tablet  -- 1 tab(s) by mouth once a day  -- Indication: For Gerd    Lumigan 0.01% ophthalmic solution  -- 1 drop(s) to each affected eye once a day (in the evening)  -- Indication: For Glaucoma    omeprazole 20 mg oral delayed release capsule  -- 1 cap(s) by mouth once a day  -- Indication: For Gerd    cyanocobalamin 100 mcg oral tablet  -- 1 tab(s) by mouth once a day  -- Indication: For supplements

## 2017-02-28 NOTE — DISCHARGE NOTE ADULT - CARE PLAN
Principal Discharge DX:	Atypical chest pain  Goal:	prevent chest pain  Instructions for follow-up, activity and diet:	- f/u with your cardiologist in 1 - 2 weeks  Secondary Diagnosis:	UTI (urinary tract infection)  Goal:	prevention  Instructions for follow-up, activity and diet:	maintain good hygiene  Secondary Diagnosis:	Hypertension  Secondary Diagnosis:	CKD (chronic kidney disease) Principal Discharge DX:	Atypical chest pain  Goal:	prevent chest pain  Instructions for follow-up, activity and diet:	- f/u with your cardiologist in 1 - 2 weeks  Secondary Diagnosis:	UTI (urinary tract infection)  Goal:	prevention  Instructions for follow-up, activity and diet:	maintain good hygiene  Secondary Diagnosis:	Hypertension  Secondary Diagnosis:	CKD (chronic kidney disease)  Secondary Diagnosis:	Urinary retention  Goal:	keep the odonnell in place at this time  Instructions for follow-up, activity and diet:	f/u with urologist in 1 week for possible removal of the odonnell with a triel of void.

## 2017-02-28 NOTE — DISCHARGE NOTE ADULT - MEDICATION SUMMARY - MEDICATIONS TO STOP TAKING
I will STOP taking the medications listed below when I get home from the hospital:    levoquin  -- 250 milligram(s) by mouth once a day MDD:1    Vicodin 300 mg-5 mg oral tablet  -- 1 tab(s) by mouth every 6 hours MDD:3  -- Caution federal law prohibits the transfer of this drug to any person other  than the person for whom it was prescribed.  Do not drink alcoholic beverages when taking this medication.  May cause drowsiness.  Alcohol may intensify this effect.  Use care when operating dangerous machinery.  This drug may impair the ability to drive or operate machinery.  Use care until you become familiar with its effects.  This product contains acetaminophen.  Do not use  with any other product containing acetaminophen to prevent possible liver damage.  Using more of this medication than prescribed may cause serious breathing problems.    Lasix 40 mg oral tablet  -- 1 tab(s) by mouth once a day    Bactrim  mg-160 mg oral tablet  -- 1 tab(s) by mouth 2 times a day

## 2017-02-28 NOTE — DISCHARGE NOTE ADULT - HOME CARE AGENCY
[facility] [phone] VISITING NURSE WILL CALL DAY AFTER DISCHARGE... DAVIDA SHEPHERD WILL BE REISTATED UPON DISCHARGE  FROM [facility] [phone] VISITING NURSE WILL CALL DAY AFTER DISCHARGE... DAVIDA SHEPHERD WILL BE REISTATED UPON DISCHARGE  FROM [facility] PROGRAM ([facility] [phone])

## 2017-02-28 NOTE — DISCHARGE NOTE ADULT - COMMUNITY RESOURCES
FAMILY WILL TAKE PT HOME [facility] [phone] WILL P/UP PT AT 6 PM (TRIP# [id number])

## 2017-02-28 NOTE — DISCHARGE NOTE ADULT - CARE PROVIDERS DIRECT ADDRESSES
,DirectAddress_Unknown,[email or web address],DirectAddress_Unknown ,DirectAddress_Unknown,[email or web address],DirectAddress_Unknown

## 2017-03-01 LAB
BUN SERPL-MCNC: 31 MG/DL — HIGH (ref 7–23)
CALCIUM SERPL-MCNC: 8.7 MG/DL — SIGNIFICANT CHANGE UP (ref 8.4–10.5)
CHLORIDE SERPL-SCNC: 109 MMOL/L — HIGH (ref 98–107)
CO2 SERPL-SCNC: 19 MMOL/L — LOW (ref 22–31)
CREAT SERPL-MCNC: 3.11 MG/DL — HIGH (ref 0.5–1.3)
GLUCOSE SERPL-MCNC: 86 MG/DL — SIGNIFICANT CHANGE UP (ref 70–99)
HCT VFR BLD CALC: 33.7 % — LOW (ref 39–50)
HGB BLD-MCNC: 11.5 G/DL — LOW (ref 13–17)
MAGNESIUM SERPL-MCNC: 2.2 MG/DL — SIGNIFICANT CHANGE UP (ref 1.6–2.6)
MCHC RBC-ENTMCNC: 27.1 PG — SIGNIFICANT CHANGE UP (ref 27–34)
MCHC RBC-ENTMCNC: 34.1 % — SIGNIFICANT CHANGE UP (ref 32–36)
MCV RBC AUTO: 79.3 FL — LOW (ref 80–100)
PHOSPHATE SERPL-MCNC: 3 MG/DL — SIGNIFICANT CHANGE UP (ref 2.5–4.5)
PLATELET # BLD AUTO: 135 K/UL — LOW (ref 150–400)
PMV BLD: 11.5 FL — SIGNIFICANT CHANGE UP (ref 7–13)
POTASSIUM SERPL-MCNC: 4.2 MMOL/L — SIGNIFICANT CHANGE UP (ref 3.5–5.3)
POTASSIUM SERPL-SCNC: 4.2 MMOL/L — SIGNIFICANT CHANGE UP (ref 3.5–5.3)
RBC # BLD: 4.25 M/UL — SIGNIFICANT CHANGE UP (ref 4.2–5.8)
RBC # FLD: 15.4 % — HIGH (ref 10.3–14.5)
SODIUM SERPL-SCNC: 144 MMOL/L — SIGNIFICANT CHANGE UP (ref 135–145)
WBC # BLD: 4.2 K/UL — SIGNIFICANT CHANGE UP (ref 3.8–10.5)
WBC # FLD AUTO: 4.2 K/UL — SIGNIFICANT CHANGE UP (ref 3.8–10.5)

## 2017-03-01 RX ADMIN — FINASTERIDE 5 MILLIGRAM(S): 5 TABLET, FILM COATED ORAL at 11:17

## 2017-03-01 RX ADMIN — ATORVASTATIN CALCIUM 20 MILLIGRAM(S): 80 TABLET, FILM COATED ORAL at 22:00

## 2017-03-01 RX ADMIN — HEPARIN SODIUM 5000 UNIT(S): 5000 INJECTION INTRAVENOUS; SUBCUTANEOUS at 17:01

## 2017-03-01 RX ADMIN — TAMSULOSIN HYDROCHLORIDE 0.4 MILLIGRAM(S): 0.4 CAPSULE ORAL at 22:00

## 2017-03-01 RX ADMIN — LATANOPROST 1 DROP(S): 0.05 SOLUTION/ DROPS OPHTHALMIC; TOPICAL at 22:00

## 2017-03-01 RX ADMIN — HEPARIN SODIUM 5000 UNIT(S): 5000 INJECTION INTRAVENOUS; SUBCUTANEOUS at 05:19

## 2017-03-01 RX ADMIN — PREGABALIN 100 MICROGRAM(S): 225 CAPSULE ORAL at 11:17

## 2017-03-01 RX ADMIN — Medication 81 MILLIGRAM(S): at 11:17

## 2017-03-01 RX ADMIN — Medication 1 GRAM(S): at 11:18

## 2017-03-01 RX ADMIN — PANTOPRAZOLE SODIUM 40 MILLIGRAM(S): 20 TABLET, DELAYED RELEASE ORAL at 05:19

## 2017-03-02 VITALS
RESPIRATION RATE: 18 BRPM | SYSTOLIC BLOOD PRESSURE: 108 MMHG | TEMPERATURE: 98 F | HEART RATE: 66 BPM | DIASTOLIC BLOOD PRESSURE: 73 MMHG | OXYGEN SATURATION: 99 %

## 2017-03-02 LAB
BUN SERPL-MCNC: 30 MG/DL — HIGH (ref 7–23)
CALCIUM SERPL-MCNC: 8.9 MG/DL — SIGNIFICANT CHANGE UP (ref 8.4–10.5)
CHLORIDE SERPL-SCNC: 105 MMOL/L — SIGNIFICANT CHANGE UP (ref 98–107)
CO2 SERPL-SCNC: 18 MMOL/L — LOW (ref 22–31)
CREAT SERPL-MCNC: 2.73 MG/DL — HIGH (ref 0.5–1.3)
GLUCOSE SERPL-MCNC: 81 MG/DL — SIGNIFICANT CHANGE UP (ref 70–99)
HCT VFR BLD CALC: 35.6 % — LOW (ref 39–50)
HGB BLD-MCNC: 12.2 G/DL — LOW (ref 13–17)
MAGNESIUM SERPL-MCNC: 2.2 MG/DL — SIGNIFICANT CHANGE UP (ref 1.6–2.6)
MCHC RBC-ENTMCNC: 27.5 PG — SIGNIFICANT CHANGE UP (ref 27–34)
MCHC RBC-ENTMCNC: 34.3 % — SIGNIFICANT CHANGE UP (ref 32–36)
MCV RBC AUTO: 80.2 FL — SIGNIFICANT CHANGE UP (ref 80–100)
PHOSPHATE SERPL-MCNC: 2.9 MG/DL — SIGNIFICANT CHANGE UP (ref 2.5–4.5)
PLATELET # BLD AUTO: 147 K/UL — LOW (ref 150–400)
PMV BLD: 12.1 FL — SIGNIFICANT CHANGE UP (ref 7–13)
POTASSIUM SERPL-MCNC: 4.3 MMOL/L — SIGNIFICANT CHANGE UP (ref 3.5–5.3)
POTASSIUM SERPL-SCNC: 4.3 MMOL/L — SIGNIFICANT CHANGE UP (ref 3.5–5.3)
RBC # BLD: 4.44 M/UL — SIGNIFICANT CHANGE UP (ref 4.2–5.8)
RBC # FLD: 15.4 % — HIGH (ref 10.3–14.5)
SODIUM SERPL-SCNC: 142 MMOL/L — SIGNIFICANT CHANGE UP (ref 135–145)
WBC # BLD: 4.99 K/UL — SIGNIFICANT CHANGE UP (ref 3.8–10.5)
WBC # FLD AUTO: 4.99 K/UL — SIGNIFICANT CHANGE UP (ref 3.8–10.5)

## 2017-03-02 RX ORDER — TAMSULOSIN HYDROCHLORIDE 0.4 MG/1
1 CAPSULE ORAL
Qty: 0 | Refills: 0 | COMMUNITY
Start: 2017-03-02

## 2017-03-02 RX ORDER — PREGABALIN 225 MG/1
1 CAPSULE ORAL
Qty: 0 | Refills: 0 | COMMUNITY
Start: 2017-03-02

## 2017-03-02 RX ORDER — FUROSEMIDE 40 MG
1 TABLET ORAL
Qty: 0 | Refills: 0 | COMMUNITY

## 2017-03-02 RX ORDER — AZTREONAM 2 G
1 VIAL (EA) INJECTION
Qty: 0 | Refills: 0 | COMMUNITY

## 2017-03-02 RX ORDER — ATORVASTATIN CALCIUM 80 MG/1
1 TABLET, FILM COATED ORAL
Qty: 0 | Refills: 0 | COMMUNITY
Start: 2017-03-02

## 2017-03-02 RX ORDER — SUCRALFATE 1 G
1 TABLET ORAL
Qty: 0 | Refills: 0 | COMMUNITY
Start: 2017-03-02

## 2017-03-02 RX ORDER — SUCRALFATE 1 G
1 TABLET ORAL
Qty: 0 | Refills: 0 | COMMUNITY

## 2017-03-02 RX ORDER — ASPIRIN/CALCIUM CARB/MAGNESIUM 324 MG
1 TABLET ORAL
Qty: 0 | Refills: 0 | COMMUNITY
Start: 2017-03-02

## 2017-03-02 RX ADMIN — Medication 1 GRAM(S): at 11:59

## 2017-03-02 RX ADMIN — FINASTERIDE 5 MILLIGRAM(S): 5 TABLET, FILM COATED ORAL at 11:59

## 2017-03-02 RX ADMIN — Medication 81 MILLIGRAM(S): at 12:00

## 2017-03-02 RX ADMIN — PANTOPRAZOLE SODIUM 40 MILLIGRAM(S): 20 TABLET, DELAYED RELEASE ORAL at 05:06

## 2017-03-02 RX ADMIN — HEPARIN SODIUM 5000 UNIT(S): 5000 INJECTION INTRAVENOUS; SUBCUTANEOUS at 05:06

## 2017-03-02 RX ADMIN — HEPARIN SODIUM 5000 UNIT(S): 5000 INJECTION INTRAVENOUS; SUBCUTANEOUS at 18:27

## 2017-03-02 RX ADMIN — PREGABALIN 100 MICROGRAM(S): 225 CAPSULE ORAL at 12:00

## 2017-03-03 ENCOUNTER — EMERGENCY (EMERGENCY)
Facility: HOSPITAL | Age: 82
LOS: 1 days | Discharge: HOME | End: 2017-03-03
Attending: EMERGENCY MEDICINE | Admitting: EMERGENCY MEDICINE
Payer: MEDICARE

## 2017-03-03 VITALS
HEART RATE: 76 BPM | RESPIRATION RATE: 17 BRPM | SYSTOLIC BLOOD PRESSURE: 110 MMHG | TEMPERATURE: 98 F | OXYGEN SATURATION: 100 % | DIASTOLIC BLOOD PRESSURE: 78 MMHG

## 2017-03-03 VITALS
RESPIRATION RATE: 18 BRPM | SYSTOLIC BLOOD PRESSURE: 131 MMHG | OXYGEN SATURATION: 98 % | TEMPERATURE: 98 F | DIASTOLIC BLOOD PRESSURE: 82 MMHG | HEART RATE: 81 BPM

## 2017-03-03 LAB
ALBUMIN SERPL ELPH-MCNC: 3.9 G/DL — SIGNIFICANT CHANGE UP (ref 3.3–5)
ALP SERPL-CCNC: 66 U/L — SIGNIFICANT CHANGE UP (ref 40–120)
ALT FLD-CCNC: 16 U/L — SIGNIFICANT CHANGE UP (ref 4–41)
APPEARANCE UR: SIGNIFICANT CHANGE UP
APTT BLD: 30.8 SEC — SIGNIFICANT CHANGE UP (ref 27.5–37.4)
AST SERPL-CCNC: 23 U/L — SIGNIFICANT CHANGE UP (ref 4–40)
BASOPHILS # BLD AUTO: 0.03 K/UL — SIGNIFICANT CHANGE UP (ref 0–0.2)
BASOPHILS NFR BLD AUTO: 0.5 % — SIGNIFICANT CHANGE UP (ref 0–2)
BILIRUB SERPL-MCNC: 0.4 MG/DL — SIGNIFICANT CHANGE UP (ref 0.2–1.2)
BILIRUB UR-MCNC: NEGATIVE — SIGNIFICANT CHANGE UP
BLD GP AB SCN SERPL QL: NEGATIVE — SIGNIFICANT CHANGE UP
BLOOD UR QL VISUAL: HIGH
BUN SERPL-MCNC: 31 MG/DL — HIGH (ref 7–23)
CALCIUM SERPL-MCNC: 8.6 MG/DL — SIGNIFICANT CHANGE UP (ref 8.4–10.5)
CHLORIDE SERPL-SCNC: 107 MMOL/L — SIGNIFICANT CHANGE UP (ref 98–107)
CO2 SERPL-SCNC: 21 MMOL/L — LOW (ref 22–31)
COLOR SPEC: HIGH
CREAT SERPL-MCNC: 2.53 MG/DL — HIGH (ref 0.5–1.3)
EOSINOPHIL # BLD AUTO: 0.43 K/UL — SIGNIFICANT CHANGE UP (ref 0–0.5)
EOSINOPHIL NFR BLD AUTO: 7 % — HIGH (ref 0–6)
GLUCOSE SERPL-MCNC: 125 MG/DL — HIGH (ref 70–99)
GLUCOSE UR-MCNC: NEGATIVE — SIGNIFICANT CHANGE UP
HCT VFR BLD CALC: 35.7 % — LOW (ref 39–50)
HGB BLD-MCNC: 12.1 G/DL — LOW (ref 13–17)
IMM GRANULOCYTES NFR BLD AUTO: 0.2 % — SIGNIFICANT CHANGE UP (ref 0–1.5)
INR BLD: 1.04 — SIGNIFICANT CHANGE UP (ref 0.87–1.18)
KETONES UR-MCNC: SIGNIFICANT CHANGE UP
LEUKOCYTE ESTERASE UR-ACNC: HIGH
LYMPHOCYTES # BLD AUTO: 1.14 K/UL — SIGNIFICANT CHANGE UP (ref 1–3.3)
LYMPHOCYTES # BLD AUTO: 18.5 % — SIGNIFICANT CHANGE UP (ref 13–44)
MCHC RBC-ENTMCNC: 27.4 PG — SIGNIFICANT CHANGE UP (ref 27–34)
MCHC RBC-ENTMCNC: 33.9 % — SIGNIFICANT CHANGE UP (ref 32–36)
MCV RBC AUTO: 81 FL — SIGNIFICANT CHANGE UP (ref 80–100)
MONOCYTES # BLD AUTO: 0.55 K/UL — SIGNIFICANT CHANGE UP (ref 0–0.9)
MONOCYTES NFR BLD AUTO: 8.9 % — SIGNIFICANT CHANGE UP (ref 2–14)
MUCOUS THREADS # UR AUTO: SIGNIFICANT CHANGE UP
NEUTROPHILS # BLD AUTO: 3.99 K/UL — SIGNIFICANT CHANGE UP (ref 1.8–7.4)
NEUTROPHILS NFR BLD AUTO: 64.9 % — SIGNIFICANT CHANGE UP (ref 43–77)
NITRITE UR-MCNC: NEGATIVE — SIGNIFICANT CHANGE UP
PH UR: 6 — SIGNIFICANT CHANGE UP (ref 4.6–8)
PLATELET # BLD AUTO: 157 K/UL — SIGNIFICANT CHANGE UP (ref 150–400)
PMV BLD: 11.4 FL — SIGNIFICANT CHANGE UP (ref 7–13)
POTASSIUM SERPL-MCNC: 4.7 MMOL/L — SIGNIFICANT CHANGE UP (ref 3.5–5.3)
POTASSIUM SERPL-SCNC: 4.7 MMOL/L — SIGNIFICANT CHANGE UP (ref 3.5–5.3)
PROT SERPL-MCNC: 7.5 G/DL — SIGNIFICANT CHANGE UP (ref 6–8.3)
PROT UR-MCNC: 100 — SIGNIFICANT CHANGE UP
PROTHROM AB SERPL-ACNC: 11.9 SEC — SIGNIFICANT CHANGE UP (ref 10–13.1)
RBC # BLD: 4.41 M/UL — SIGNIFICANT CHANGE UP (ref 4.2–5.8)
RBC # FLD: 15.4 % — HIGH (ref 10.3–14.5)
RBC CASTS # UR COMP ASSIST: >50 — HIGH (ref 0–?)
RH IG SCN BLD-IMP: POSITIVE — SIGNIFICANT CHANGE UP
SODIUM SERPL-SCNC: 143 MMOL/L — SIGNIFICANT CHANGE UP (ref 135–145)
SP GR SPEC: 1.02 — SIGNIFICANT CHANGE UP (ref 1–1.03)
UROBILINOGEN FLD QL: NORMAL E.U. — SIGNIFICANT CHANGE UP (ref 0.1–0.2)
WBC # BLD: 6.15 K/UL — SIGNIFICANT CHANGE UP (ref 3.8–10.5)
WBC # FLD AUTO: 6.15 K/UL — SIGNIFICANT CHANGE UP (ref 3.8–10.5)
WBC CLUMPS #/AREA URNS HPF: PRESENT — HIGH (ref 0–?)
WBC UR QL: >50 — HIGH (ref 0–?)

## 2017-03-03 PROCEDURE — 99284 EMERGENCY DEPT VISIT MOD MDM: CPT | Mod: GC

## 2017-03-03 RX ORDER — CIPROFLOXACIN LACTATE 400MG/40ML
400 VIAL (ML) INTRAVENOUS ONCE
Qty: 0 | Refills: 0 | Status: COMPLETED | OUTPATIENT
Start: 2017-03-03 | End: 2017-03-03

## 2017-03-03 RX ORDER — MOXIFLOXACIN HYDROCHLORIDE TABLETS, 400 MG 400 MG/1
1 TABLET, FILM COATED ORAL
Qty: 14 | Refills: 0 | OUTPATIENT
Start: 2017-03-03 | End: 2017-03-10

## 2017-03-03 RX ADMIN — Medication 200 MILLIGRAM(S): at 19:45

## 2017-03-03 NOTE — ED PROVIDER NOTE - PROGRESS NOTE DETAILS
odonnell cath changed in ED, s/p IV cipro x1 dose. SW to assist with transportation home. Called Son to discuss plan and continuing abx with outpt Urology f/u. Son comfortable with plan.

## 2017-03-03 NOTE — ED ADULT TRIAGE NOTE - CHIEF COMPLAINT QUOTE
Brought in by ems from home for hematuria in odonnell catheter. Odonnell cath changed at 6am and changed again at 1pm. EMS unsure why odonnell was changed 2 times. After second odonnell change pt started to have hematuria. Co nausea, given Zofran 4mg IVP by ems. Denies taking blood thinners. Brought in by ems from home for hematuria in odonnell catheter. Odonnell cath changed at 6am and changed again at 1pm. EMS unsure why odonnell was changed 2 times. After second odonnell change pt started to have hematuria. Co nausea, given Zofran 4mg IVP by ems. Denies taking blood thinners. Pt also co mild neck pain.

## 2017-03-03 NOTE — ED PROVIDER NOTE - OBJECTIVE STATEMENT
83 y/o M with PMH of CVA, CKD, HTN, GERD, BPH, Intractable Hiccups, recent dx of UTI and urinary retention s/p odonnell cath (dc'd from [facility] yesterday) p/w hematuria. Pt offers no complaints, aide noted red looking urine in odonnell bag. No fever/chills, N/V/D. No pain.

## 2017-03-03 NOTE — ED PROVIDER NOTE - CONSTITUTIONAL, MLM
normal... Chronically ill appearing, well nourished, awake, alert, oriented to person, place, time/situation and in no apparent distress.

## 2017-03-03 NOTE — ED PROVIDER NOTE - ATTENDING CONTRIBUTION TO CARE
Evaluated, examined and discussed at length with Dr. Dejesus and agree with continued evaluation and management plans....Bart

## 2017-03-03 NOTE — ED PROVIDER NOTE - MEDICAL DECISION MAKING DETAILS
hematuria with urinary retention and odonnell cath. check cbc, cmp, ua. irrigate odonnell 83 yo man with recent urinary tract infection and retention with odonnell placed while in hospital, discharged yesterday and today was noted to have some blood streaking in drainage tube, and dark urine.  No obvious large clots noted.  He has no complaints and feels well at this time.  Exam with stable vitals, afebrile.  Pink conj, anicteric. Normal mucosa.  Neck supple.  Lungs generally clear.  Heart without murmur.  Abdomen soft and nontender.  No suprapubic fullness, or pain to palpation.  No CVA tenderness.  Extremities wihout pain or swelling.  Clinically with urinary retention and UTI under treatment, concern about irritation with potential blockage by any possible clots.  Will plan to recheck labs, urine and irrigation of odonnell and/or replacement.  Will follow with Dr. Dejesus.....Bart  hematuria with urinary retention and odonnell cath. check cbc, cmp, ua. irrigate odonnell

## 2017-03-03 NOTE — ED ADULT NURSE NOTE - OBJECTIVE STATEMENT
A&Ox2, respirations even and unlabored, speaks in clear and full sentences, left side weakness s/p CVA 5 years ago as per patient, 400 cc of red urine in odonnell bag, odonnell in place denies pain in site, skin warm and dry good for color, sacrum intact. Patient reports midsternal chest pain. Denies SOB, abdominal pain, dizziness, LOC, nausea or vomiting, numbness or tingling to extremities. As per daughter, EMS was called for blood in urine.

## 2017-03-03 NOTE — ED ADULT NURSE NOTE - CHIEF COMPLAINT QUOTE
Brought in by ems from home for hematuria in odonnell catheter. Odonnell cath changed at 6am and changed again at 1pm. EMS unsure why odonnell was changed 2 times. After second odonnell change pt started to have hematuria. Co nausea, given Zofran 4mg IVP by ems. Denies taking blood thinners. Pt also co mild neck pain.

## 2017-03-03 NOTE — PROVIDER CONTACT NOTE (OTHER) - ASSESSMENT
Arranged [facility] [phone]. P/U 9 PM as requested by Medical Team.

## 2017-07-22 ENCOUNTER — INPATIENT (INPATIENT)
Facility: HOSPITAL | Age: 82
LOS: 5 days | Discharge: NURSING, REHAB OR LONG-TERM CARE | End: 2017-07-28
Attending: INTERNAL MEDICINE | Admitting: INTERNAL MEDICINE
Payer: MEDICARE

## 2017-07-22 VITALS
HEART RATE: 79 BPM | OXYGEN SATURATION: 96 % | RESPIRATION RATE: 16 BRPM | DIASTOLIC BLOOD PRESSURE: 58 MMHG | SYSTOLIC BLOOD PRESSURE: 86 MMHG

## 2017-07-22 DIAGNOSIS — Z29.9 ENCOUNTER FOR PROPHYLACTIC MEASURES, UNSPECIFIED: ICD-10-CM

## 2017-07-22 DIAGNOSIS — R07.9 CHEST PAIN, UNSPECIFIED: ICD-10-CM

## 2017-07-22 DIAGNOSIS — I10 ESSENTIAL (PRIMARY) HYPERTENSION: ICD-10-CM

## 2017-07-22 DIAGNOSIS — N18.9 CHRONIC KIDNEY DISEASE, UNSPECIFIED: ICD-10-CM

## 2017-07-22 LAB
ALBUMIN SERPL ELPH-MCNC: 3.7 G/DL — SIGNIFICANT CHANGE UP (ref 3.3–5)
ALBUMIN SERPL ELPH-MCNC: 3.9 G/DL — SIGNIFICANT CHANGE UP (ref 3.3–5)
ALP SERPL-CCNC: 78 U/L — SIGNIFICANT CHANGE UP (ref 40–120)
ALP SERPL-CCNC: 85 U/L — SIGNIFICANT CHANGE UP (ref 40–120)
ALT FLD-CCNC: 16 U/L — SIGNIFICANT CHANGE UP (ref 4–41)
ALT FLD-CCNC: 16 U/L — SIGNIFICANT CHANGE UP (ref 4–41)
APTT BLD: 31.7 SEC — SIGNIFICANT CHANGE UP (ref 27.5–37.4)
AST SERPL-CCNC: 17 U/L — SIGNIFICANT CHANGE UP (ref 4–40)
AST SERPL-CCNC: 17 U/L — SIGNIFICANT CHANGE UP (ref 4–40)
BASOPHILS # BLD AUTO: 0.04 K/UL — SIGNIFICANT CHANGE UP (ref 0–0.2)
BASOPHILS NFR BLD AUTO: 0.8 % — SIGNIFICANT CHANGE UP (ref 0–2)
BILIRUB SERPL-MCNC: 0.4 MG/DL — SIGNIFICANT CHANGE UP (ref 0.2–1.2)
BILIRUB SERPL-MCNC: 0.5 MG/DL — SIGNIFICANT CHANGE UP (ref 0.2–1.2)
BUN SERPL-MCNC: 29 MG/DL — HIGH (ref 7–23)
BUN SERPL-MCNC: 30 MG/DL — HIGH (ref 7–23)
CALCIUM SERPL-MCNC: 8.8 MG/DL — SIGNIFICANT CHANGE UP (ref 8.4–10.5)
CALCIUM SERPL-MCNC: 9.1 MG/DL — SIGNIFICANT CHANGE UP (ref 8.4–10.5)
CHLORIDE SERPL-SCNC: 106 MMOL/L — SIGNIFICANT CHANGE UP (ref 98–107)
CHLORIDE SERPL-SCNC: 106 MMOL/L — SIGNIFICANT CHANGE UP (ref 98–107)
CK MB BLD-MCNC: 1.6 — SIGNIFICANT CHANGE UP (ref 0–2.5)
CK MB BLD-MCNC: 2.03 NG/ML — SIGNIFICANT CHANGE UP (ref 1–6.6)
CK MB BLD-MCNC: 2.56 NG/ML — SIGNIFICANT CHANGE UP (ref 1–6.6)
CK MB BLD-MCNC: SIGNIFICANT CHANGE UP (ref 0–2.5)
CK SERPL-CCNC: 113 U/L — SIGNIFICANT CHANGE UP (ref 30–200)
CK SERPL-CCNC: 163 U/L — SIGNIFICANT CHANGE UP (ref 30–200)
CO2 SERPL-SCNC: 23 MMOL/L — SIGNIFICANT CHANGE UP (ref 22–31)
CO2 SERPL-SCNC: 24 MMOL/L — SIGNIFICANT CHANGE UP (ref 22–31)
CREAT SERPL-MCNC: 2.14 MG/DL — HIGH (ref 0.5–1.3)
CREAT SERPL-MCNC: 2.33 MG/DL — HIGH (ref 0.5–1.3)
EOSINOPHIL # BLD AUTO: 0.43 K/UL — SIGNIFICANT CHANGE UP (ref 0–0.5)
EOSINOPHIL NFR BLD AUTO: 8.2 % — HIGH (ref 0–6)
GLUCOSE SERPL-MCNC: 85 MG/DL — SIGNIFICANT CHANGE UP (ref 70–99)
GLUCOSE SERPL-MCNC: 88 MG/DL — SIGNIFICANT CHANGE UP (ref 70–99)
HCT VFR BLD CALC: 32.7 % — LOW (ref 39–50)
HGB BLD-MCNC: 10.8 G/DL — LOW (ref 13–17)
IMM GRANULOCYTES # BLD AUTO: 0.02 # — SIGNIFICANT CHANGE UP
IMM GRANULOCYTES NFR BLD AUTO: 0.4 % — SIGNIFICANT CHANGE UP (ref 0–1.5)
INR BLD: 1.12 — SIGNIFICANT CHANGE UP (ref 0.88–1.17)
LYMPHOCYTES # BLD AUTO: 1.17 K/UL — SIGNIFICANT CHANGE UP (ref 1–3.3)
LYMPHOCYTES # BLD AUTO: 22.4 % — SIGNIFICANT CHANGE UP (ref 13–44)
MAGNESIUM SERPL-MCNC: 2.3 MG/DL — SIGNIFICANT CHANGE UP (ref 1.6–2.6)
MCHC RBC-ENTMCNC: 26.7 PG — LOW (ref 27–34)
MCHC RBC-ENTMCNC: 33 % — SIGNIFICANT CHANGE UP (ref 32–36)
MCV RBC AUTO: 80.9 FL — SIGNIFICANT CHANGE UP (ref 80–100)
MONOCYTES # BLD AUTO: 0.56 K/UL — SIGNIFICANT CHANGE UP (ref 0–0.9)
MONOCYTES NFR BLD AUTO: 10.7 % — SIGNIFICANT CHANGE UP (ref 2–14)
NEUTROPHILS # BLD AUTO: 3 K/UL — SIGNIFICANT CHANGE UP (ref 1.8–7.4)
NEUTROPHILS NFR BLD AUTO: 57.5 % — SIGNIFICANT CHANGE UP (ref 43–77)
NRBC # FLD: 0 — SIGNIFICANT CHANGE UP
PHOSPHATE SERPL-MCNC: 2.7 MG/DL — SIGNIFICANT CHANGE UP (ref 2.5–4.5)
PLATELET # BLD AUTO: 160 K/UL — SIGNIFICANT CHANGE UP (ref 150–400)
PMV BLD: 11.1 FL — SIGNIFICANT CHANGE UP (ref 7–13)
POTASSIUM SERPL-MCNC: 4.6 MMOL/L — SIGNIFICANT CHANGE UP (ref 3.5–5.3)
POTASSIUM SERPL-MCNC: 4.6 MMOL/L — SIGNIFICANT CHANGE UP (ref 3.5–5.3)
POTASSIUM SERPL-SCNC: 4.6 MMOL/L — SIGNIFICANT CHANGE UP (ref 3.5–5.3)
POTASSIUM SERPL-SCNC: 4.6 MMOL/L — SIGNIFICANT CHANGE UP (ref 3.5–5.3)
PROT SERPL-MCNC: 7.3 G/DL — SIGNIFICANT CHANGE UP (ref 6–8.3)
PROT SERPL-MCNC: 7.8 G/DL — SIGNIFICANT CHANGE UP (ref 6–8.3)
PROTHROM AB SERPL-ACNC: 12.6 SEC — SIGNIFICANT CHANGE UP (ref 9.8–13.1)
RBC # BLD: 4.04 M/UL — LOW (ref 4.2–5.8)
RBC # FLD: 14.6 % — HIGH (ref 10.3–14.5)
SODIUM SERPL-SCNC: 142 MMOL/L — SIGNIFICANT CHANGE UP (ref 135–145)
SODIUM SERPL-SCNC: 143 MMOL/L — SIGNIFICANT CHANGE UP (ref 135–145)
TROPONIN T SERPL-MCNC: < 0.06 NG/ML — SIGNIFICANT CHANGE UP (ref 0–0.06)
TROPONIN T SERPL-MCNC: < 0.06 NG/ML — SIGNIFICANT CHANGE UP (ref 0–0.06)
WBC # BLD: 5.22 K/UL — SIGNIFICANT CHANGE UP (ref 3.8–10.5)
WBC # FLD AUTO: 5.22 K/UL — SIGNIFICANT CHANGE UP (ref 3.8–10.5)

## 2017-07-22 PROCEDURE — 71010: CPT | Mod: 26

## 2017-07-22 RX ORDER — HYDRALAZINE HCL 50 MG
10 TABLET ORAL ONCE
Qty: 0 | Refills: 0 | Status: COMPLETED | OUTPATIENT
Start: 2017-07-22 | End: 2017-07-22

## 2017-07-22 RX ORDER — TIMOLOL 0.5 %
2 DROPS OPHTHALMIC (EYE) DAILY
Qty: 0 | Refills: 0 | Status: DISCONTINUED | OUTPATIENT
Start: 2017-07-22 | End: 2017-07-28

## 2017-07-22 RX ORDER — FUROSEMIDE 40 MG
40 TABLET ORAL DAILY
Qty: 0 | Refills: 0 | Status: DISCONTINUED | OUTPATIENT
Start: 2017-07-22 | End: 2017-07-25

## 2017-07-22 RX ORDER — PANTOPRAZOLE SODIUM 20 MG/1
40 TABLET, DELAYED RELEASE ORAL
Qty: 0 | Refills: 0 | Status: DISCONTINUED | OUTPATIENT
Start: 2017-07-22 | End: 2017-07-28

## 2017-07-22 RX ORDER — CYCLOPENTOLATE HYDROCHLORIDE 10 MG/ML
1 SOLUTION/ DROPS OPHTHALMIC
Qty: 0 | Refills: 0 | Status: DISCONTINUED | OUTPATIENT
Start: 2017-07-22 | End: 2017-07-28

## 2017-07-22 RX ORDER — DUTASTERIDE 0.5 MG/1
1 CAPSULE, LIQUID FILLED ORAL
Qty: 0 | Refills: 0 | COMMUNITY

## 2017-07-22 RX ORDER — OFLOXACIN 0.3 %
1 DROPS OPHTHALMIC (EYE) THREE TIMES A DAY
Qty: 0 | Refills: 0 | Status: DISCONTINUED | OUTPATIENT
Start: 2017-07-22 | End: 2017-07-28

## 2017-07-22 RX ORDER — ATORVASTATIN CALCIUM 80 MG/1
20 TABLET, FILM COATED ORAL AT BEDTIME
Qty: 0 | Refills: 0 | Status: DISCONTINUED | OUTPATIENT
Start: 2017-07-22 | End: 2017-07-28

## 2017-07-22 RX ORDER — DUREZOL 0.5 MG/ML
1 EMULSION OPHTHALMIC
Qty: 0 | Refills: 0 | COMMUNITY

## 2017-07-22 RX ORDER — BIMATOPROST 0.3 MG/ML
1 SOLUTION/ DROPS OPHTHALMIC
Qty: 0 | Refills: 0 | COMMUNITY

## 2017-07-22 RX ORDER — DUREZOL 0.5 MG/ML
1 EMULSION OPHTHALMIC EVERY 4 HOURS
Qty: 0 | Refills: 0 | Status: DISCONTINUED | OUTPATIENT
Start: 2017-07-22 | End: 2017-07-25

## 2017-07-22 RX ORDER — BRIMONIDINE TARTRATE 2 MG/MG
2 SOLUTION/ DROPS OPHTHALMIC DAILY
Qty: 0 | Refills: 0 | Status: DISCONTINUED | OUTPATIENT
Start: 2017-07-22 | End: 2017-07-28

## 2017-07-22 RX ORDER — ASPIRIN/CALCIUM CARB/MAGNESIUM 324 MG
81 TABLET ORAL DAILY
Qty: 0 | Refills: 0 | Status: DISCONTINUED | OUTPATIENT
Start: 2017-07-22 | End: 2017-07-28

## 2017-07-22 RX ORDER — ONDANSETRON 8 MG/1
4 TABLET, FILM COATED ORAL ONCE
Qty: 0 | Refills: 0 | Status: COMPLETED | OUTPATIENT
Start: 2017-07-22 | End: 2017-07-22

## 2017-07-22 RX ORDER — LATANOPROST 0.05 MG/ML
1 SOLUTION/ DROPS OPHTHALMIC; TOPICAL AT BEDTIME
Qty: 0 | Refills: 0 | Status: DISCONTINUED | OUTPATIENT
Start: 2017-07-22 | End: 2017-07-28

## 2017-07-22 RX ORDER — HEPARIN SODIUM 5000 [USP'U]/ML
5000 INJECTION INTRAVENOUS; SUBCUTANEOUS EVERY 12 HOURS
Qty: 0 | Refills: 0 | Status: DISCONTINUED | OUTPATIENT
Start: 2017-07-22 | End: 2017-07-28

## 2017-07-22 RX ADMIN — LATANOPROST 1 DROP(S): 0.05 SOLUTION/ DROPS OPHTHALMIC; TOPICAL at 21:42

## 2017-07-22 RX ADMIN — Medication 1 DROP(S): at 21:37

## 2017-07-22 RX ADMIN — ATORVASTATIN CALCIUM 20 MILLIGRAM(S): 80 TABLET, FILM COATED ORAL at 21:37

## 2017-07-22 RX ADMIN — ONDANSETRON 4 MILLIGRAM(S): 8 TABLET, FILM COATED ORAL at 14:53

## 2017-07-22 RX ADMIN — CYCLOPENTOLATE HYDROCHLORIDE 1 DROP(S): 10 SOLUTION/ DROPS OPHTHALMIC at 21:37

## 2017-07-22 RX ADMIN — Medication 10 MILLIGRAM(S): at 21:37

## 2017-07-22 NOTE — ED PROVIDER NOTE - OBJECTIVE STATEMENT
83 y/o M with PMH of CVA, CKD, HTN, GERD, BPH, p/w left sided chest pain. Pain since last nigh while at rest. Pressure like pain without any aggravating factors. Radiates down. Mild Nausea. Received nitro + asa by EMS with mild improvement.. No hx of trauma or recent falls. 85 y/o M with PMH of CVA, CKD, HTN, GERD, BPH, p/w left sided chest pain. Pain since last night while at rest. Pressure like pain without any aggravating factors. Radiates down. Mild Nausea. Received nitro + asa by EMS with mild improvement.. No hx of trauma or recent falls.

## 2017-07-22 NOTE — H&P ADULT - NSHPREVIEWOFSYSTEMS_GEN_ALL_CORE
Denies any SOB, abd pain, fever, chills, nausea, vomiting, constipation, diarrhea, dysuria, pyuria, back pain, trauma.

## 2017-07-22 NOTE — ED ADULT TRIAGE NOTE - CHIEF COMPLAINT QUOTE
Pt c/o chest pain x 2 hr , unrelieved by asp and nitro spray.  BP 86/58 at traige.  Denies dizziness, palpitation, sob.

## 2017-07-22 NOTE — PATIENT PROFILE ADULT. - VISION (WITH CORRECTIVE LENSES IF THE PATIENT USUALLY WEARS THEM):
I had eye surgery, I don't remember what type/Partially impaired: cannot see medication labels or newsprint, but can see obstacles in path, and the surrounding layout; can count fingers at arm's length

## 2017-07-22 NOTE — ED PROVIDER NOTE - ATTENDING CONTRIBUTION TO CARE
I, Radha Sutherland M.D. have examined the patient and confirmed the essential components of the history, physical examination, diagnosis, and treatment plan. I agree with the patient's care as documented by the resident and amended herein by me. See note above for complete details of service.  83 yo M Hx CVA, CKD, GERD, BPH, HTN, Recurrent Hiccups who pw L sided chest pain since last night. Pt with hiccups in ED. Exam reveals RRR, clear lungs, L chest wal ttp without overlying skin changes, soft abdomen, equal pulses. Plan ACS work up. Supportive measures. Admit to Tele for further work up and mgmt.

## 2017-07-22 NOTE — ED ADULT NURSE NOTE - OBJECTIVE STATEMENT
pt is an 84 yr old male c/o chest pain since yesterday, pain 5/10, s/p "whatever medication the nurse gave me" pt states pain in his heart. denies worsening with deep breathing sob, HA, fever, chills, recent illness. pt states feeling nusea, no vomitting, changes in BM/ urination, pt states he is sometimes incontinant. PIV placed, labs sent, pt on CM- SR

## 2017-07-22 NOTE — CHART NOTE - NSCHARTNOTEFT_GEN_A_CORE
Per RN pt is A&O x 2   Chart reviewed - A&Ox3 in ER  Seen at bedside  Alert and orient to self and place but not time  ?sundown vs. ?mild dementia - unknown baseline mental status  no focal deficit  moving all extremities  d/w Dr Montalvo - no CT head at this time. continue monitoring

## 2017-07-22 NOTE — ED PROVIDER NOTE - MEDICAL DECISION MAKING DETAILS
pt with chest pain, echo last feb, stress several years ago, will check cardiacs, EKG, cxr, likely admit for r/o. pt with chest pain, echo last feb, stress several years ago, will check cardiacs, EKG, cxr, likely admit for ACS w/up.

## 2017-07-22 NOTE — H&P ADULT - NSHPLABSRESULTS_GEN_ALL_CORE
10.8   5.22  )-----------( 160      ( 22 Jul 2017 14:10 )             32.7   07-22    142  |  106  |  30<H>  ----------------------------<  85  4.6   |  23  |  2.33<H>    Ca    8.8      22 Jul 2017 14:10    TPro  7.3  /  Alb  3.7  /  TBili  0.4  /  DBili  x   /  AST  17  /  ALT  16  /  AlkPhos  78  07-22  CARDIAC MARKERS ( 22 Jul 2017 14:10 )  x     / < 0.06 ng/mL / 113 u/L / 2.03 ng/mL / x        EKG NSR @72

## 2017-07-22 NOTE — H&P ADULT - HISTORY OF PRESENT ILLNESS
84 year old male pmh CVA, BPH, GERD, CKD, HTN p/w left sided chest pain. Chest pain started one day ago abruptly while at rest, 8/10 sharp pain , non-radiating, lasting about 5 minutes, stopped on its own, with-out associated symptoms. In the past had similar pain but did not seek medical attention. Today the pain occurred, same as yesterday, ambulance was called and he came to ER. He received aspirin and nitroglycerin and pain improved a little bit. 84 year old male pmh CVA, BPH, GERD, CKD, HTN p/w left sided chest pain. Chest pain started one day ago abruptly while at rest, 8/10 sharp pain , non-radiating, lasting about 5 minutes, stopped on its own, with-out associated symptoms. In the past had similar pain but did not seek medical attention. Today the pain occurred, same as yesterday, ambulance was called and he came to ER. He received aspirin and nitroglycerin and pain improved a little bit.  per son had a stress test a few weeks ago at his doctors office but does not know the results.  Denies any SOB, abd pain, fever, chills, nausea, vomiting, constipation, diarrhea, dysuria, pyuria, back pain, trauma.

## 2017-07-23 DIAGNOSIS — N17.9 ACUTE KIDNEY FAILURE, UNSPECIFIED: ICD-10-CM

## 2017-07-23 DIAGNOSIS — I12.9 HYPERTENSIVE CHRONIC KIDNEY DISEASE WITH STAGE 1 THROUGH STAGE 4 CHRONIC KIDNEY DISEASE, OR UNSPECIFIED CHRONIC KIDNEY DISEASE: ICD-10-CM

## 2017-07-23 DIAGNOSIS — N18.3 CHRONIC KIDNEY DISEASE, STAGE 3 (MODERATE): ICD-10-CM

## 2017-07-23 LAB
APPEARANCE UR: CLEAR — SIGNIFICANT CHANGE UP
BILIRUB UR-MCNC: NEGATIVE — SIGNIFICANT CHANGE UP
BLOOD UR QL VISUAL: HIGH
BUN SERPL-MCNC: 26 MG/DL — HIGH (ref 7–23)
CALCIUM SERPL-MCNC: 8.9 MG/DL — SIGNIFICANT CHANGE UP (ref 8.4–10.5)
CHLORIDE SERPL-SCNC: 107 MMOL/L — SIGNIFICANT CHANGE UP (ref 98–107)
CHOLEST SERPL-MCNC: 180 MG/DL — SIGNIFICANT CHANGE UP (ref 120–199)
CO2 SERPL-SCNC: 22 MMOL/L — SIGNIFICANT CHANGE UP (ref 22–31)
COLOR SPEC: SIGNIFICANT CHANGE UP
CREAT SERPL-MCNC: 1.88 MG/DL — HIGH (ref 0.5–1.3)
GLUCOSE SERPL-MCNC: 81 MG/DL — SIGNIFICANT CHANGE UP (ref 70–99)
GLUCOSE UR-MCNC: NEGATIVE — SIGNIFICANT CHANGE UP
HBA1C BLD-MCNC: 6.2 % — HIGH (ref 4–5.6)
HCT VFR BLD CALC: 35.5 % — LOW (ref 39–50)
HDLC SERPL-MCNC: 59 MG/DL — HIGH (ref 35–55)
HGB BLD-MCNC: 12 G/DL — LOW (ref 13–17)
KETONES UR-MCNC: NEGATIVE — SIGNIFICANT CHANGE UP
LEUKOCYTE ESTERASE UR-ACNC: NEGATIVE — SIGNIFICANT CHANGE UP
LIPID PNL WITH DIRECT LDL SERPL: 117 MG/DL — SIGNIFICANT CHANGE UP
MCHC RBC-ENTMCNC: 27.4 PG — SIGNIFICANT CHANGE UP (ref 27–34)
MCHC RBC-ENTMCNC: 33.8 % — SIGNIFICANT CHANGE UP (ref 32–36)
MCV RBC AUTO: 81.1 FL — SIGNIFICANT CHANGE UP (ref 80–100)
MUCOUS THREADS # UR AUTO: SIGNIFICANT CHANGE UP
NITRITE UR-MCNC: NEGATIVE — SIGNIFICANT CHANGE UP
NRBC # FLD: 0 — SIGNIFICANT CHANGE UP
PH UR: 7.5 — SIGNIFICANT CHANGE UP (ref 4.6–8)
PLATELET # BLD AUTO: 155 K/UL — SIGNIFICANT CHANGE UP (ref 150–400)
PMV BLD: 11.4 FL — SIGNIFICANT CHANGE UP (ref 7–13)
POTASSIUM SERPL-MCNC: 4.5 MMOL/L — SIGNIFICANT CHANGE UP (ref 3.5–5.3)
POTASSIUM SERPL-SCNC: 4.5 MMOL/L — SIGNIFICANT CHANGE UP (ref 3.5–5.3)
PROT UR-MCNC: 20 — SIGNIFICANT CHANGE UP
RBC # BLD: 4.38 M/UL — SIGNIFICANT CHANGE UP (ref 4.2–5.8)
RBC # FLD: 14.6 % — HIGH (ref 10.3–14.5)
RBC CASTS # UR COMP ASSIST: HIGH (ref 0–?)
SODIUM SERPL-SCNC: 142 MMOL/L — SIGNIFICANT CHANGE UP (ref 135–145)
SP GR SPEC: 1.01 — SIGNIFICANT CHANGE UP (ref 1–1.03)
SQUAMOUS # UR AUTO: SIGNIFICANT CHANGE UP
TRIGL SERPL-MCNC: 52 MG/DL — SIGNIFICANT CHANGE UP (ref 10–149)
TSH SERPL-MCNC: 1.01 UIU/ML — SIGNIFICANT CHANGE UP (ref 0.27–4.2)
UROBILINOGEN FLD QL: NORMAL E.U. — SIGNIFICANT CHANGE UP (ref 0.1–0.2)
WBC # BLD: 5.34 K/UL — SIGNIFICANT CHANGE UP (ref 3.8–10.5)
WBC # FLD AUTO: 5.34 K/UL — SIGNIFICANT CHANGE UP (ref 3.8–10.5)
WBC UR QL: SIGNIFICANT CHANGE UP (ref 0–?)

## 2017-07-23 RX ORDER — LANOLIN ALCOHOL/MO/W.PET/CERES
3 CREAM (GRAM) TOPICAL AT BEDTIME
Qty: 0 | Refills: 0 | Status: DISCONTINUED | OUTPATIENT
Start: 2017-07-23 | End: 2017-07-28

## 2017-07-23 RX ORDER — METOPROLOL TARTRATE 50 MG
25 TABLET ORAL
Qty: 0 | Refills: 0 | Status: DISCONTINUED | OUTPATIENT
Start: 2017-07-23 | End: 2017-07-28

## 2017-07-23 RX ADMIN — PANTOPRAZOLE SODIUM 40 MILLIGRAM(S): 20 TABLET, DELAYED RELEASE ORAL at 05:39

## 2017-07-23 RX ADMIN — CYCLOPENTOLATE HYDROCHLORIDE 1 DROP(S): 10 SOLUTION/ DROPS OPHTHALMIC at 17:30

## 2017-07-23 RX ADMIN — Medication 1 DROP(S): at 05:39

## 2017-07-23 RX ADMIN — Medication 40 MILLIGRAM(S): at 05:39

## 2017-07-23 RX ADMIN — Medication 1 DROP(S): at 22:13

## 2017-07-23 RX ADMIN — Medication 81 MILLIGRAM(S): at 11:24

## 2017-07-23 RX ADMIN — BRIMONIDINE TARTRATE 2 DROP(S): 2 SOLUTION/ DROPS OPHTHALMIC at 11:25

## 2017-07-23 RX ADMIN — Medication 3 MILLIGRAM(S): at 22:06

## 2017-07-23 RX ADMIN — LATANOPROST 1 DROP(S): 0.05 SOLUTION/ DROPS OPHTHALMIC; TOPICAL at 22:06

## 2017-07-23 RX ADMIN — Medication 25 MILLIGRAM(S): at 22:06

## 2017-07-23 RX ADMIN — HEPARIN SODIUM 5000 UNIT(S): 5000 INJECTION INTRAVENOUS; SUBCUTANEOUS at 17:30

## 2017-07-23 RX ADMIN — CYCLOPENTOLATE HYDROCHLORIDE 1 DROP(S): 10 SOLUTION/ DROPS OPHTHALMIC at 05:40

## 2017-07-23 RX ADMIN — Medication 2 DROP(S): at 11:25

## 2017-07-23 RX ADMIN — ATORVASTATIN CALCIUM 20 MILLIGRAM(S): 80 TABLET, FILM COATED ORAL at 22:06

## 2017-07-23 RX ADMIN — Medication 25 MILLIGRAM(S): at 14:16

## 2017-07-23 RX ADMIN — Medication 1 DROP(S): at 13:03

## 2017-07-23 RX ADMIN — HEPARIN SODIUM 5000 UNIT(S): 5000 INJECTION INTRAVENOUS; SUBCUTANEOUS at 05:40

## 2017-07-23 NOTE — CONSULT NOTE ADULT - PROBLEM SELECTOR RECOMMENDATION 2
baseline SCr ~1.5-2.0. Renal function better than last admission.   CKD-3 in the setting of ?controlled HTN.  Repeat UA and check Upr/Cr.   Monitor BMP.

## 2017-07-23 NOTE — CONSULT NOTE ADULT - ATTENDING COMMENTS
[facility] NEPHROLOGY  Surinder Stevens M.D.  Migel Parikh D.O.  Yusra Lambert M.D.  Yanira Brooks, MSN, ANP-C  [phone]    [address] [place] [address]

## 2017-07-23 NOTE — CONSULT NOTE ADULT - PROBLEM SELECTOR RECOMMENDATION 9
FRANK likely hemodynamically mediated in the setting of uncontrolled BP. FRANK resolving. UA with trace blood and 20 protein. Consider repeating UA when BP is controlled.   Check urine lytes. Pt with h/o urinary retention in the past. Check renal US.   Strict I/Os. Avoid nephrotoxins/ NSAIDs/ RCA. Monitor BMP.

## 2017-07-23 NOTE — CONSULT NOTE ADULT - SUBJECTIVE AND OBJECTIVE BOX
[facility] NEPHROLOGY- CONSULTATION NOTE    Patient is a 84y Male with CVA, BPH, GERD, CKD, HTN p/w left sided chest pain. Chest pain started one day ago abruptly while at rest, 8/10 sharp pain , non-radiating, lasting about 5 minutes, stopped on its own, with-out associated symptoms. Pt poor historian and unable to obtain full history. Pt states he has had CKD for 1 year but has not seen a nephrologist. Pt denies any NSAID use, recent CT with contrast, hepatitis or blood transfusions.  Pt denies any dyuria, hematuria or foamy urine but states that he has had a decline in the quantity of his urine for "years". Pt denies any SOB, current chest pain, abd pain, fever, chills, nausea, vomiting, constipation, diarrhea, or le edema.   Pt states he was recently told by his PMD Dr. Brooks that he had elevated BP but was just monitoring the blood pressure with no current tx.   84 year old male pmh CVA, BPH, GERD, CKD, HTN p/w left sided chest pain. Chest pain started one day ago abruptly while at rest, 8/10 sharp pain , non-radiating, lasting about 5 minutes, stopped on its own, with-out associated symptoms. In the past had similar pain but did not seek medical attention. Today the pain occurred, same as yesterday, ambulance was called and he came to ER. He received aspirin and nitroglycerin and pain improved a little bit.    When reviewing sunrise; pt with h/o urinary retention in the past requiring odonnell.     PAST MEDICAL & SURGICAL HISTORY:  CKD (chronic kidney disease)  Hypertension  CVA (cerebral infarction)  GERD (Gastroesophageal Reflux Disease)  Anxiety  Intractable Hiccups  Back Pain  History of Laminectomy  Status Post Eye Surgery  S/P Bilateral Inguinal Hernia Repair    No Known Allergies    Home Medications Reviewed  Hospital Medications:   MEDICATIONS  (STANDING):  aspirin  chewable 81 milliGRAM(s) Oral daily  atorvastatin 20 milliGRAM(s) Oral at bedtime  cyclopentolate 2% Solution 1 Drop(s) Left EYE two times a day  latanoprost 0.005% Ophthalmic Solution 1 Drop(s) Right EYE at bedtime  pantoprazole    Tablet 40 milliGRAM(s) Oral before breakfast  ofloxacin 0.3% Solution 1 Drop(s) Left EYE three times a day  brimonidine 0.2% Ophthalmic Solution 2 Drop(s) Right EYE daily  timolol 0.5% Solution 2 Drop(s) Right EYE daily  difluprednate 0.05% Ophthalmic Emulsion 1 Drop(s) Right EYE every 4 hours  furosemide    Tablet 40 milliGRAM(s) Oral daily  heparin  Injectable 5000 Unit(s) SubCutaneous every 12 hours  metoprolol 25 milliGRAM(s) Oral two times a day      REVIEW OF SYSTEMS:  Gen: no changes in weight  HEENT: no rhinorrhea  Neck: no sore throat  Cards: no chest pain- resolved  Resp: no dyspnea  GI: no nausea or vomiting or diarrhea  : no dysuria or hematuria  Vascular: no LE edema  Derm: no rashes  Neuro: no numbness/tingling  All other review of systems is negative unless indicated above.    VITALS:  T(F): 98.6 (17 @ 13:25), Max: 98.8 (17 @ 05:37)  HR: 95 (17 @ 13:25)  BP: 146/106 (17 @ 13:25)  RR: 18 (17 @ 13:25)  SpO2: 100% (17 @ 13:25)  Wt(kg): --    Height (cm): 177.8 ( @ 21:18)  Weight (kg): 59.1 ( @ 21:18)  BMI (kg/m2): 18.7 ( @ 21:18)  BSA (m2): 1.74 ( @ 21:18)    PHYSICAL EXAM:  Gen: NAD, calm  HEENT: MMM  Neck: no JVD  Cards: RRR, +S1/S2, no M/G/R  Resp: CTA B/L  GI: soft, NT/ND, NABS  : no CVA tenderness  Extremities: no LE edema B/L  Derm: no rashes  Neuro: non-focal    LABS:      142  |  107  |  26<H>  ----------------------------<  81  4.5   |  22  |  1.88<H>    Ca    8.9      2017 07:20  Phos  2.7       Mg     2.3         TPro  7.8  /  Alb  3.9  /  TBili  0.5  /  DBili      /  AST  17  /  ALT  16  /  AlkPhos  85  07-22    Creatinine Trend: 1.88 <--, 2.14 <--, 2.33 <--                        12.0   5.34  )-----------( 155      ( 2017 07:20 )             35.5     Urine Studies:  Urinalysis Basic - ( 2017 06:16 )    Color: PLYEL / Appearance: CLEAR / S.010 / pH: 7.5  Gluc: NEGATIVE / Ketone: NEGATIVE  / Bili: NEGATIVE / Urobili: NORMAL E.U.   Blood: TRACE / Protein: 20 / Nitrite: NEGATIVE   Leuk Esterase: NEGATIVE / RBC: 5-10 / WBC 0-2   Sq Epi: OCC / Non Sq Epi:  / Bacteria:         RADIOLOGY & ADDITIONAL STUDIES:

## 2017-07-23 NOTE — CONSULT NOTE ADULT - ASSESSMENT
Patient is a 84y Male with CKD-3, CVA, BPH, GERD, CKD, HTN p/w left sided chest pain admitted to r/o ACS and with FRANK on CKD-3.

## 2017-07-24 DIAGNOSIS — R60.0 LOCALIZED EDEMA: ICD-10-CM

## 2017-07-24 DIAGNOSIS — N40.1 BENIGN PROSTATIC HYPERPLASIA WITH LOWER URINARY TRACT SYMPTOMS: ICD-10-CM

## 2017-07-24 LAB
APPEARANCE UR: CLEAR — SIGNIFICANT CHANGE UP
BILIRUB UR-MCNC: NEGATIVE — SIGNIFICANT CHANGE UP
BLOOD UR QL VISUAL: HIGH
BUN SERPL-MCNC: 25 MG/DL — HIGH (ref 7–23)
CALCIUM SERPL-MCNC: 8.7 MG/DL — SIGNIFICANT CHANGE UP (ref 8.4–10.5)
CHLORIDE SERPL-SCNC: 106 MMOL/L — SIGNIFICANT CHANGE UP (ref 98–107)
CHLORIDE UR-SCNC: 74 MMOL/L — SIGNIFICANT CHANGE UP
CO2 SERPL-SCNC: 21 MMOL/L — LOW (ref 22–31)
COLOR SPEC: YELLOW — SIGNIFICANT CHANGE UP
CREAT ?TM UR-MCNC: 91.34 MG/DL — SIGNIFICANT CHANGE UP
CREAT SERPL-MCNC: 1.96 MG/DL — HIGH (ref 0.5–1.3)
GLUCOSE SERPL-MCNC: 83 MG/DL — SIGNIFICANT CHANGE UP (ref 70–99)
GLUCOSE UR-MCNC: NEGATIVE — SIGNIFICANT CHANGE UP
HCT VFR BLD CALC: 34.9 % — LOW (ref 39–50)
HGB BLD-MCNC: 12 G/DL — LOW (ref 13–17)
KETONES UR-MCNC: NEGATIVE — SIGNIFICANT CHANGE UP
LEUKOCYTE ESTERASE UR-ACNC: HIGH
MCHC RBC-ENTMCNC: 27.5 PG — SIGNIFICANT CHANGE UP (ref 27–34)
MCHC RBC-ENTMCNC: 34.4 % — SIGNIFICANT CHANGE UP (ref 32–36)
MCV RBC AUTO: 80 FL — SIGNIFICANT CHANGE UP (ref 80–100)
MUCOUS THREADS # UR AUTO: SIGNIFICANT CHANGE UP
NITRITE UR-MCNC: NEGATIVE — SIGNIFICANT CHANGE UP
NRBC # FLD: 0 — SIGNIFICANT CHANGE UP
PH UR: 7 — SIGNIFICANT CHANGE UP (ref 4.6–8)
PLATELET # BLD AUTO: 153 K/UL — SIGNIFICANT CHANGE UP (ref 150–400)
PMV BLD: 11.6 FL — SIGNIFICANT CHANGE UP (ref 7–13)
POTASSIUM SERPL-MCNC: 4.7 MMOL/L — SIGNIFICANT CHANGE UP (ref 3.5–5.3)
POTASSIUM SERPL-SCNC: 4.7 MMOL/L — SIGNIFICANT CHANGE UP (ref 3.5–5.3)
POTASSIUM UR-SCNC: 33.8 MEQ/L — SIGNIFICANT CHANGE UP
PROT UR-MCNC: 20 — SIGNIFICANT CHANGE UP
PROT UR-MCNC: 23.1 MG/DL — SIGNIFICANT CHANGE UP
RBC # BLD: 4.36 M/UL — SIGNIFICANT CHANGE UP (ref 4.2–5.8)
RBC # FLD: 14.5 % — SIGNIFICANT CHANGE UP (ref 10.3–14.5)
RBC CASTS # UR COMP ASSIST: >50 — HIGH (ref 0–?)
SODIUM SERPL-SCNC: 142 MMOL/L — SIGNIFICANT CHANGE UP (ref 135–145)
SODIUM UR-SCNC: 101 MEQ/L — SIGNIFICANT CHANGE UP
SP GR SPEC: 1.01 — SIGNIFICANT CHANGE UP (ref 1–1.03)
SQUAMOUS # UR AUTO: SIGNIFICANT CHANGE UP
UROBILINOGEN FLD QL: NORMAL E.U. — SIGNIFICANT CHANGE UP (ref 0.1–0.2)
UUN UR-MCNC: 481.7 MG/DL — SIGNIFICANT CHANGE UP
WBC # BLD: 5.62 K/UL — SIGNIFICANT CHANGE UP (ref 3.8–10.5)
WBC # FLD AUTO: 5.62 K/UL — SIGNIFICANT CHANGE UP (ref 3.8–10.5)
WBC UR QL: SIGNIFICANT CHANGE UP (ref 0–?)

## 2017-07-24 RX ADMIN — Medication 81 MILLIGRAM(S): at 11:35

## 2017-07-24 RX ADMIN — Medication 1 DROP(S): at 13:02

## 2017-07-24 RX ADMIN — HEPARIN SODIUM 5000 UNIT(S): 5000 INJECTION INTRAVENOUS; SUBCUTANEOUS at 05:17

## 2017-07-24 RX ADMIN — Medication 25 MILLIGRAM(S): at 17:29

## 2017-07-24 RX ADMIN — BRIMONIDINE TARTRATE 2 DROP(S): 2 SOLUTION/ DROPS OPHTHALMIC at 11:35

## 2017-07-24 RX ADMIN — Medication 1 DROP(S): at 22:25

## 2017-07-24 RX ADMIN — ATORVASTATIN CALCIUM 20 MILLIGRAM(S): 80 TABLET, FILM COATED ORAL at 22:25

## 2017-07-24 RX ADMIN — Medication 2 DROP(S): at 11:35

## 2017-07-24 RX ADMIN — CYCLOPENTOLATE HYDROCHLORIDE 1 DROP(S): 10 SOLUTION/ DROPS OPHTHALMIC at 05:17

## 2017-07-24 RX ADMIN — Medication 3 MILLIGRAM(S): at 22:25

## 2017-07-24 RX ADMIN — CYCLOPENTOLATE HYDROCHLORIDE 1 DROP(S): 10 SOLUTION/ DROPS OPHTHALMIC at 17:29

## 2017-07-24 RX ADMIN — Medication 1 DROP(S): at 05:17

## 2017-07-24 RX ADMIN — Medication 40 MILLIGRAM(S): at 05:17

## 2017-07-24 RX ADMIN — HEPARIN SODIUM 5000 UNIT(S): 5000 INJECTION INTRAVENOUS; SUBCUTANEOUS at 17:29

## 2017-07-24 RX ADMIN — PANTOPRAZOLE SODIUM 40 MILLIGRAM(S): 20 TABLET, DELAYED RELEASE ORAL at 05:17

## 2017-07-24 RX ADMIN — Medication 25 MILLIGRAM(S): at 05:17

## 2017-07-24 RX ADMIN — LATANOPROST 1 DROP(S): 0.05 SOLUTION/ DROPS OPHTHALMIC; TOPICAL at 22:25

## 2017-07-25 LAB
BASOPHILS # BLD AUTO: 0.04 K/UL — SIGNIFICANT CHANGE UP (ref 0–0.2)
BASOPHILS NFR BLD AUTO: 0.7 % — SIGNIFICANT CHANGE UP (ref 0–2)
BUN SERPL-MCNC: 29 MG/DL — HIGH (ref 7–23)
CALCIUM SERPL-MCNC: 9.4 MG/DL — SIGNIFICANT CHANGE UP (ref 8.4–10.5)
CHLORIDE SERPL-SCNC: 103 MMOL/L — SIGNIFICANT CHANGE UP (ref 98–107)
CO2 SERPL-SCNC: 18 MMOL/L — LOW (ref 22–31)
CREAT SERPL-MCNC: 2.19 MG/DL — HIGH (ref 0.5–1.3)
EOSINOPHIL # BLD AUTO: 0.55 K/UL — HIGH (ref 0–0.5)
EOSINOPHIL NFR BLD AUTO: 9.7 % — HIGH (ref 0–6)
GLUCOSE SERPL-MCNC: 75 MG/DL — SIGNIFICANT CHANGE UP (ref 70–99)
HBV SURFACE AG SER-ACNC: NEGATIVE — SIGNIFICANT CHANGE UP
HCT VFR BLD CALC: 38.8 % — LOW (ref 39–50)
HGB BLD-MCNC: 13.1 G/DL — SIGNIFICANT CHANGE UP (ref 13–17)
IMM GRANULOCYTES # BLD AUTO: 0 # — SIGNIFICANT CHANGE UP
IMM GRANULOCYTES NFR BLD AUTO: 0 % — SIGNIFICANT CHANGE UP (ref 0–1.5)
KAPPA FREE LIGHT CHAINS, SERUM: 9.12 MG/DL — HIGH (ref 0.33–1.94)
LAMBDA FREE LIGHT CHAINS, SERUM: 4.72 MG/DL — HIGH (ref 0.57–2.63)
LYMPHOCYTES # BLD AUTO: 1.49 K/UL — SIGNIFICANT CHANGE UP (ref 1–3.3)
LYMPHOCYTES # BLD AUTO: 26.2 % — SIGNIFICANT CHANGE UP (ref 13–44)
MAGNESIUM SERPL-MCNC: 2.2 MG/DL — SIGNIFICANT CHANGE UP (ref 1.6–2.6)
MCHC RBC-ENTMCNC: 26.7 PG — LOW (ref 27–34)
MCHC RBC-ENTMCNC: 33.8 % — SIGNIFICANT CHANGE UP (ref 32–36)
MCV RBC AUTO: 79.2 FL — LOW (ref 80–100)
MONOCYTES # BLD AUTO: 0.61 K/UL — SIGNIFICANT CHANGE UP (ref 0–0.9)
MONOCYTES NFR BLD AUTO: 10.7 % — SIGNIFICANT CHANGE UP (ref 2–14)
NEUTROPHILS # BLD AUTO: 2.99 K/UL — SIGNIFICANT CHANGE UP (ref 1.8–7.4)
NEUTROPHILS NFR BLD AUTO: 52.7 % — SIGNIFICANT CHANGE UP (ref 43–77)
NRBC # FLD: 0 — SIGNIFICANT CHANGE UP
PLATELET # BLD AUTO: 172 K/UL — SIGNIFICANT CHANGE UP (ref 150–400)
PMV BLD: 11.3 FL — SIGNIFICANT CHANGE UP (ref 7–13)
POTASSIUM SERPL-MCNC: 5 MMOL/L — SIGNIFICANT CHANGE UP (ref 3.5–5.3)
POTASSIUM SERPL-SCNC: 5 MMOL/L — SIGNIFICANT CHANGE UP (ref 3.5–5.3)
PROT SERPL-MCNC: 8 G/DL — SIGNIFICANT CHANGE UP (ref 6–8.3)
RBC # BLD: 4.9 M/UL — SIGNIFICANT CHANGE UP (ref 4.2–5.8)
RBC # FLD: 14.7 % — HIGH (ref 10.3–14.5)
SODIUM SERPL-SCNC: 139 MMOL/L — SIGNIFICANT CHANGE UP (ref 135–145)
WBC # BLD: 5.68 K/UL — SIGNIFICANT CHANGE UP (ref 3.8–10.5)
WBC # FLD AUTO: 5.68 K/UL — SIGNIFICANT CHANGE UP (ref 3.8–10.5)

## 2017-07-25 PROCEDURE — 84165 PROTEIN E-PHORESIS SERUM: CPT | Mod: 26

## 2017-07-25 PROCEDURE — 78452 HT MUSCLE IMAGE SPECT MULT: CPT | Mod: 26

## 2017-07-25 PROCEDURE — 93018 CV STRESS TEST I&R ONLY: CPT | Mod: GC

## 2017-07-25 PROCEDURE — 93016 CV STRESS TEST SUPVJ ONLY: CPT | Mod: GC

## 2017-07-25 PROCEDURE — 86334 IMMUNOFIX E-PHORESIS SERUM: CPT | Mod: 26

## 2017-07-25 RX ORDER — PREDNISOLONE SODIUM PHOSPHATE 1 %
1 DROPS OPHTHALMIC (EYE) EVERY 4 HOURS
Qty: 0 | Refills: 0 | Status: DISCONTINUED | OUTPATIENT
Start: 2017-07-25 | End: 2017-07-28

## 2017-07-25 RX ADMIN — Medication 1 DROP(S): at 05:35

## 2017-07-25 RX ADMIN — PANTOPRAZOLE SODIUM 40 MILLIGRAM(S): 20 TABLET, DELAYED RELEASE ORAL at 05:34

## 2017-07-25 RX ADMIN — Medication 1 DROP(S): at 17:23

## 2017-07-25 RX ADMIN — Medication 1 DROP(S): at 21:50

## 2017-07-25 RX ADMIN — CYCLOPENTOLATE HYDROCHLORIDE 1 DROP(S): 10 SOLUTION/ DROPS OPHTHALMIC at 05:35

## 2017-07-25 RX ADMIN — HEPARIN SODIUM 5000 UNIT(S): 5000 INJECTION INTRAVENOUS; SUBCUTANEOUS at 17:22

## 2017-07-25 RX ADMIN — Medication 1 DROP(S): at 13:45

## 2017-07-25 RX ADMIN — Medication 40 MILLIGRAM(S): at 05:34

## 2017-07-25 RX ADMIN — ATORVASTATIN CALCIUM 20 MILLIGRAM(S): 80 TABLET, FILM COATED ORAL at 21:49

## 2017-07-25 RX ADMIN — BRIMONIDINE TARTRATE 2 DROP(S): 2 SOLUTION/ DROPS OPHTHALMIC at 11:37

## 2017-07-25 RX ADMIN — Medication 3 MILLIGRAM(S): at 21:51

## 2017-07-25 RX ADMIN — Medication 2 DROP(S): at 11:38

## 2017-07-25 RX ADMIN — HEPARIN SODIUM 5000 UNIT(S): 5000 INJECTION INTRAVENOUS; SUBCUTANEOUS at 05:35

## 2017-07-25 RX ADMIN — CYCLOPENTOLATE HYDROCHLORIDE 1 DROP(S): 10 SOLUTION/ DROPS OPHTHALMIC at 17:22

## 2017-07-25 RX ADMIN — LATANOPROST 1 DROP(S): 0.05 SOLUTION/ DROPS OPHTHALMIC; TOPICAL at 21:49

## 2017-07-25 RX ADMIN — Medication 81 MILLIGRAM(S): at 11:37

## 2017-07-25 RX ADMIN — Medication 25 MILLIGRAM(S): at 17:22

## 2017-07-26 ENCOUNTER — TRANSCRIPTION ENCOUNTER (OUTPATIENT)
Age: 82
End: 2017-07-26

## 2017-07-26 LAB
BASOPHILS # BLD AUTO: 0.02 K/UL — SIGNIFICANT CHANGE UP (ref 0–0.2)
BASOPHILS NFR BLD AUTO: 0.4 % — SIGNIFICANT CHANGE UP (ref 0–2)
BUN SERPL-MCNC: 35 MG/DL — HIGH (ref 7–23)
CALCIUM SERPL-MCNC: 9 MG/DL — SIGNIFICANT CHANGE UP (ref 8.4–10.5)
CHLORIDE SERPL-SCNC: 104 MMOL/L — SIGNIFICANT CHANGE UP (ref 98–107)
CO2 SERPL-SCNC: 22 MMOL/L — SIGNIFICANT CHANGE UP (ref 22–31)
CREAT SERPL-MCNC: 2.4 MG/DL — HIGH (ref 0.5–1.3)
EOSINOPHIL # BLD AUTO: 0.56 K/UL — HIGH (ref 0–0.5)
EOSINOPHIL NFR BLD AUTO: 11 % — HIGH (ref 0–6)
GAS PNL BLDMV: SIGNIFICANT CHANGE UP
GLUCOSE SERPL-MCNC: 89 MG/DL — SIGNIFICANT CHANGE UP (ref 70–99)
HCT VFR BLD CALC: 38.5 % — LOW (ref 39–50)
HCV AB S/CO SERPL IA: 0.43 S/CO — SIGNIFICANT CHANGE UP
HCV AB SERPL-IMP: SIGNIFICANT CHANGE UP
HGB BLD-MCNC: 12.8 G/DL — LOW (ref 13–17)
IMM GRANULOCYTES # BLD AUTO: 0.01 # — SIGNIFICANT CHANGE UP
IMM GRANULOCYTES NFR BLD AUTO: 0.2 % — SIGNIFICANT CHANGE UP (ref 0–1.5)
LYMPHOCYTES # BLD AUTO: 1.15 K/UL — SIGNIFICANT CHANGE UP (ref 1–3.3)
LYMPHOCYTES # BLD AUTO: 22.7 % — SIGNIFICANT CHANGE UP (ref 13–44)
MAGNESIUM SERPL-MCNC: 2.1 MG/DL — SIGNIFICANT CHANGE UP (ref 1.6–2.6)
MCHC RBC-ENTMCNC: 26.6 PG — LOW (ref 27–34)
MCHC RBC-ENTMCNC: 33.2 % — SIGNIFICANT CHANGE UP (ref 32–36)
MCV RBC AUTO: 80 FL — SIGNIFICANT CHANGE UP (ref 80–100)
MONOCYTES # BLD AUTO: 0.6 K/UL — SIGNIFICANT CHANGE UP (ref 0–0.9)
MONOCYTES NFR BLD AUTO: 11.8 % — SIGNIFICANT CHANGE UP (ref 2–14)
NEUTROPHILS # BLD AUTO: 2.73 K/UL — SIGNIFICANT CHANGE UP (ref 1.8–7.4)
NEUTROPHILS NFR BLD AUTO: 53.9 % — SIGNIFICANT CHANGE UP (ref 43–77)
NRBC # FLD: 0 — SIGNIFICANT CHANGE UP
PLATELET # BLD AUTO: 155 K/UL — SIGNIFICANT CHANGE UP (ref 150–400)
PMV BLD: 11.2 FL — SIGNIFICANT CHANGE UP (ref 7–13)
POTASSIUM SERPL-MCNC: 3.8 MMOL/L — SIGNIFICANT CHANGE UP (ref 3.5–5.3)
POTASSIUM SERPL-SCNC: 3.8 MMOL/L — SIGNIFICANT CHANGE UP (ref 3.5–5.3)
RBC # BLD: 4.81 M/UL — SIGNIFICANT CHANGE UP (ref 4.2–5.8)
RBC # FLD: 14.6 % — HIGH (ref 10.3–14.5)
SODIUM SERPL-SCNC: 141 MMOL/L — SIGNIFICANT CHANGE UP (ref 135–145)
WBC # BLD: 5.07 K/UL — SIGNIFICANT CHANGE UP (ref 3.8–10.5)
WBC # FLD AUTO: 5.07 K/UL — SIGNIFICANT CHANGE UP (ref 3.8–10.5)

## 2017-07-26 RX ORDER — TIMOLOL 0.5 %
2 DROPS OPHTHALMIC (EYE)
Qty: 0 | Refills: 0 | COMMUNITY
Start: 2017-07-26

## 2017-07-26 RX ORDER — PREDNISOLONE SODIUM PHOSPHATE 1 %
1 DROPS OPHTHALMIC (EYE)
Qty: 0 | Refills: 0 | COMMUNITY
Start: 2017-07-26

## 2017-07-26 RX ORDER — METOPROLOL TARTRATE 50 MG
1 TABLET ORAL
Qty: 0 | Refills: 0 | COMMUNITY
Start: 2017-07-26

## 2017-07-26 RX ADMIN — Medication 1 DROP(S): at 20:00

## 2017-07-26 RX ADMIN — HEPARIN SODIUM 5000 UNIT(S): 5000 INJECTION INTRAVENOUS; SUBCUTANEOUS at 05:38

## 2017-07-26 RX ADMIN — BRIMONIDINE TARTRATE 2 DROP(S): 2 SOLUTION/ DROPS OPHTHALMIC at 12:17

## 2017-07-26 RX ADMIN — Medication 1 DROP(S): at 17:28

## 2017-07-26 RX ADMIN — Medication 81 MILLIGRAM(S): at 12:16

## 2017-07-26 RX ADMIN — PANTOPRAZOLE SODIUM 40 MILLIGRAM(S): 20 TABLET, DELAYED RELEASE ORAL at 05:39

## 2017-07-26 RX ADMIN — HEPARIN SODIUM 5000 UNIT(S): 5000 INJECTION INTRAVENOUS; SUBCUTANEOUS at 17:28

## 2017-07-26 RX ADMIN — Medication 1 DROP(S): at 05:39

## 2017-07-26 RX ADMIN — Medication 3 MILLIGRAM(S): at 21:47

## 2017-07-26 RX ADMIN — Medication 1 DROP(S): at 12:21

## 2017-07-26 RX ADMIN — LATANOPROST 1 DROP(S): 0.05 SOLUTION/ DROPS OPHTHALMIC; TOPICAL at 21:47

## 2017-07-26 RX ADMIN — CYCLOPENTOLATE HYDROCHLORIDE 1 DROP(S): 10 SOLUTION/ DROPS OPHTHALMIC at 17:28

## 2017-07-26 RX ADMIN — CYCLOPENTOLATE HYDROCHLORIDE 1 DROP(S): 10 SOLUTION/ DROPS OPHTHALMIC at 05:37

## 2017-07-26 RX ADMIN — Medication 1 DROP(S): at 05:40

## 2017-07-26 RX ADMIN — Medication 2 DROP(S): at 12:16

## 2017-07-26 RX ADMIN — Medication 1 DROP(S): at 12:15

## 2017-07-26 RX ADMIN — Medication 1 DROP(S): at 02:00

## 2017-07-26 RX ADMIN — Medication 25 MILLIGRAM(S): at 05:38

## 2017-07-26 RX ADMIN — ATORVASTATIN CALCIUM 20 MILLIGRAM(S): 80 TABLET, FILM COATED ORAL at 21:47

## 2017-07-26 RX ADMIN — Medication 1 DROP(S): at 21:47

## 2017-07-26 NOTE — DISCHARGE NOTE ADULT - SECONDARY DIAGNOSIS.
CKD (chronic kidney disease) Essential hypertension Benign prostatic hyperplasia with lower urinary tract symptoms, unspecified morphology Status post eye surgery Microscopic hematuria

## 2017-07-26 NOTE — DISCHARGE NOTE ADULT - MEDICATION SUMMARY - MEDICATIONS TO STOP TAKING
I will STOP taking the medications listed below when I get home from the hospital:    furosemide 40 mg oral tablet  -- 1 tab(s) by mouth once a day    Klor-Con 10 mEq oral tablet, extended release  -- 1 tab(s) by mouth once a day

## 2017-07-26 NOTE — DISCHARGE NOTE ADULT - PLAN OF CARE
Resolved Follow up with Your Cardiologist or PMD in 1-2 weeks Stable Follow up with your Nephrologist or PMD in 1-2 weeks Compliance with Meds Folow up with Your PMD in 1-2 weeks Follow up with Your PMD in 1-2 weeks medications compliance take meds as prescribed follow up with eye specialist in 1  week follow up with urology-- 2 weeks Follow up with Your PMD in 1-2 weeks, take medications as prescribed

## 2017-07-26 NOTE — DISCHARGE NOTE ADULT - HOSPITAL COURSE
84 year old male pmh CVA, BPH, GERD, CKD, HTN p/w left sided chest pain. Chest pain started one day ago abruptly while at rest, 8/10 sharp pain , non-radiating, lasting about 5 minutes, stopped on its own, with-out associated symptoms. In the past had similar pain but did not seek medical attention. Today the pain occurred, same as yesterday, ambulance was called and he came to ER. He received aspirin and nitroglycerin and pain improved a little bit.  per son had a stress test a few weeks ago at his doctors office but does not know the results.  Denies any SOB, abd pain, fever, chills, nausea, vomiting, constipation, diarrhea, dysuria, pyuria, back pain, trauma.  During Hospital Course:   Pt Ruled out For ACS  Seen by Renal for FRANK which was  likely hemodynamically mediated in the setting of uncontrolled BP. FRANK resolving.   h/o urinary retention in the past.  renal US was recommended, however pt refused study  He Underwent a Stress test: There is a medium sized, mild defect in inferior wall that is fixed, suggestive of infarct.  * Post-stress gated wall motion analysis was performed (LVEF = 68 %;LVEDV = 32 ml.), revealing normal LV function  Case discussed with attending, Pt Now is stable for Discharge to Rehab. 84 year old male pmh CVA, BPH, GERD, CKD, HTN p/w left sided chest pain. Chest pain started one day ago abruptly while at rest, 8/10 sharp pain , non-radiating, lasting about 5 minutes, stopped on its own, with-out associated symptoms. In the past had similar pain but did not seek medical attention. Today the pain occurred, same as yesterday, ambulance was called and he came to ER. He received aspirin and nitroglycerin and pain improved a little bit.  per son had a stress test a few weeks ago at his doctors office but does not know the results.  Denies any SOB, abd pain, fever, chills, nausea, vomiting, constipation, diarrhea, dysuria, pyuria, back pain, trauma.  During Hospital Course:   Pt Ruled out For ACS  Seen by Renal for FRANK which was  likely hemodynamically mediated in the setting of uncontrolled BP. FRANK resolving.   h/o urinary retention in the past.  renal US was recommended, however pt refused study, Creat appear to be Stable  He Underwent a Stress test: There is a medium sized, mild defect in inferior wall that is fixed, suggestive of infarct.  * Post-stress gated wall motion analysis was performed (LVEF = 68 %;LVEDV = 32 ml.), revealing normal LV function  Case discussed with attending, Pt Now is stable for Discharge to Rehab. 84 year old male pmh CVA, BPH, GERD, CKD, HTN p/w left sided chest pain. Chest pain started one day ago abruptly while at rest, 8/10 sharp pain , non-radiating, lasting about 5 minutes, stopped on its own, with-out associated symptoms. In the past had similar pain but did not seek medical attention. Today the pain occurred, same as yesterday, ambulance was called and he came to ER. He received aspirin and nitroglycerin and pain improved a little bit.  As per son had a stress test a few weeks ago at his doctors office but does not know the results.  Denies any SOB, abd pain, fever, chills, nausea, vomiting, constipation, diarrhea, dysuria, pyuria, back pain, trauma.  During Hospital Course:     Seen by Renal for FRANK which was  likely hemodynamically mediated in the setting of uncontrolled BP. renal US was recommended, however pt refused study, Creat appear to be Stable  FRANK resolving.  Creatinine-- 2.41. Pt will follow up with nephrology after discharge     --> h/o urinary retention in the past. + microscopic hematuria -- pt will follow up with urology in 2 weeks     --> Pt Ruled out For ACS. He underwent a Stress test: There is a medium sized, mild defect in inferior wall that is fixed, suggestive of infarct. * Post-stress gated wall motion analysis was performed (LVEF = 68 %;LVEDV = 32 ml.), revealing normal LV function.    Case discussed with attending and nephrology, Pt now is stable for Discharge to Rehab.

## 2017-07-26 NOTE — DISCHARGE NOTE ADULT - VISION (WITH CORRECTIVE LENSES IF THE PATIENT USUALLY WEARS THEM):
Partially impaired: cannot see medication labels or newsprint, but can see obstacles in path, and the surrounding layout; can count fingers at arm's length/I had eye surgery, I don't remember what type

## 2017-07-26 NOTE — DISCHARGE NOTE ADULT - CARE PLAN
Principal Discharge DX:	Chest pain  Goal:	Resolved  Instructions for follow-up, activity and diet:	Follow up with Your Cardiologist or PMD in 1-2 weeks  Secondary Diagnosis:	CKD (chronic kidney disease)  Goal:	Stable  Instructions for follow-up, activity and diet:	Follow up with your Nephrologist or PMD in 1-2 weeks  Secondary Diagnosis:	Essential hypertension  Goal:	Compliance with Meds  Instructions for follow-up, activity and diet:	Folow up with Your PMD in 1-2 weeks  Secondary Diagnosis:	Benign prostatic hyperplasia with lower urinary tract symptoms, unspecified morphology Principal Discharge DX:	Chest pain  Goal:	Resolved  Instructions for follow-up, activity and diet:	Follow up with Your Cardiologist or PMD in 1-2 weeks  Secondary Diagnosis:	CKD (chronic kidney disease)  Goal:	Stable  Instructions for follow-up, activity and diet:	Follow up with your Nephrologist or PMD in 1-2 weeks  Secondary Diagnosis:	Essential hypertension  Goal:	Compliance with Meds  Instructions for follow-up, activity and diet:	Follow up with Your PMD in 1-2 weeks  Secondary Diagnosis:	Benign prostatic hyperplasia with lower urinary tract symptoms, unspecified morphology Principal Discharge DX:	Chest pain  Goal:	Resolved  Instructions for follow-up, activity and diet:	Follow up with Your PMD in 1-2 weeks  Secondary Diagnosis:	CKD (chronic kidney disease)  Goal:	Stable  Instructions for follow-up, activity and diet:	Follow up with your Nephrologist or PMD in 1-2 weeks  Secondary Diagnosis:	Essential hypertension  Goal:	Compliance with Meds  Instructions for follow-up, activity and diet:	Follow up with Your PMD in 1-2 weeks, take medications as prescribed  Secondary Diagnosis:	Benign prostatic hyperplasia with lower urinary tract symptoms, unspecified morphology  Goal:	medications compliance  Instructions for follow-up, activity and diet:	take meds as prescribed  Secondary Diagnosis:	Status post eye surgery  Goal:	medications compliance  Instructions for follow-up, activity and diet:	follow up with eye specialist in 1  week  Secondary Diagnosis:	Microscopic hematuria  Instructions for follow-up, activity and diet:	follow up with urology-- 2 weeks

## 2017-07-26 NOTE — DISCHARGE NOTE ADULT - PATIENT PORTAL LINK FT
“You can access the [facility] Patient Portal, offered by [facility], by registering with the following website: [email or web address]”

## 2017-07-26 NOTE — DISCHARGE NOTE ADULT - MEDICATION SUMMARY - MEDICATIONS TO TAKE
I will START or STAY ON the medications listed below when I get home from the hospital:    aspirin 81 mg oral tablet, chewable  -- 1 tab(s) by mouth once a day  -- Indication: For Cardiovascular agent     atorvastatin 20 mg oral tablet  -- 1 tab(s) by mouth once a day (at bedtime)  -- Indication: For Cardiovascular agent    metoprolol tartrate 25 mg oral tablet  -- 1 tab(s) by mouth 2 times a day  -- Indication: For HTN-- hypertension    prednisoLONE acetate 1% ophthalmic suspension  -- 1 drop(s) to each affected eye every 4 hours  -- Indication: For ophthalmic solution    Lumigan 0.01% ophthalmic solution  -- 1 drop(s) in the right eye once a day (in the evening)  -- Indication: For ophthalmic solution    Combigan 0.2%-0.5% ophthalmic solution  -- 2 drop(s) in the right eye once a day  -- Indication: For ophthalmic solution    ofloxacin 0.3% ophthalmic solution  -- 1 drop(s) in the left eye 3 times a day  -- Indication: For ophthalmic solution    cyclopentolate 2% ophthalmic solution  -- 1 drop(s) in the left eye 2 times a day  -- Indication: For ophthalmic solution    difluprednate 0.05% ophthalmic emulsion  -- 1 drop(s) to each affected eye 6 times a day  -- Indication: For ophthalmic solution    omeprazole 20 mg oral delayed release capsule  -- 1 cap(s) by mouth once a day  -- Indication: For ACid reflux     cyanocobalamin 100 mcg oral tablet  -- 1 tab(s) by mouth once a day  -- Indication: For vitamins

## 2017-07-26 NOTE — DISCHARGE NOTE ADULT - PROVIDER TOKENS
FREE:[LAST:[Dr. Kory Brooks],PHONE:[(   )    -],FAX:[(   )    -]] FREE:[LAST:[Dr. Kory Brooks],PHONE:[(   )    -],FAX:[(   )    -]],TOKEN:'[id number]' TOKEN:'[id number]',FREE:[LAST:[PCP and cardiology -- Dr Kory Brooks],PHONE:[(   )    -],FAX:[(   )    -]]

## 2017-07-26 NOTE — DISCHARGE NOTE ADULT - CARE PROVIDER_API CALL
Dr. Kory Brooks,   Phone: (   )    -  Fax: (   )    - Dr. Kory Brooks,   Phone: (   )    -  Fax: (   )    -    Migel Parikh (DO), Internal Medicine  [address] [place] [address]  Phone: [phone]  Fax: [phone] Migel Parikh (DO), Internal Medicine  [address] [place] [address]  Phone: [phone]  Fax: [phone]    PCP and cardiology -- Dr Kory Brooks,   Phone: (   )    -  Fax: (   )    -

## 2017-07-27 LAB
BASOPHILS # BLD AUTO: 0.04 K/UL — SIGNIFICANT CHANGE UP (ref 0–0.2)
BASOPHILS NFR BLD AUTO: 0.8 % — SIGNIFICANT CHANGE UP (ref 0–2)
BUN SERPL-MCNC: 41 MG/DL — HIGH (ref 7–23)
CALCIUM SERPL-MCNC: 9.1 MG/DL — SIGNIFICANT CHANGE UP (ref 8.4–10.5)
CHLORIDE SERPL-SCNC: 106 MMOL/L — SIGNIFICANT CHANGE UP (ref 98–107)
CO2 SERPL-SCNC: 21 MMOL/L — LOW (ref 22–31)
CREAT ?TM UR-MCNC: 148.25 MG/DL — SIGNIFICANT CHANGE UP
CREAT SERPL-MCNC: 2.45 MG/DL — HIGH (ref 0.5–1.3)
EOSINOPHIL # BLD AUTO: 0.61 K/UL — HIGH (ref 0–0.5)
EOSINOPHIL NFR BLD AUTO: 11.6 % — HIGH (ref 0–6)
GLUCOSE SERPL-MCNC: 84 MG/DL — SIGNIFICANT CHANGE UP (ref 70–99)
HCT VFR BLD CALC: 38.5 % — LOW (ref 39–50)
HGB BLD-MCNC: 13.1 G/DL — SIGNIFICANT CHANGE UP (ref 13–17)
IMM GRANULOCYTES # BLD AUTO: 0.01 # — SIGNIFICANT CHANGE UP
IMM GRANULOCYTES NFR BLD AUTO: 0.2 % — SIGNIFICANT CHANGE UP (ref 0–1.5)
KAPPA/LAMBDA FREE LIGHT CHAIN RATIO, SERUM: 1.93 — SIGNIFICANT CHANGE UP
LYMPHOCYTES # BLD AUTO: 1.48 K/UL — SIGNIFICANT CHANGE UP (ref 1–3.3)
LYMPHOCYTES # BLD AUTO: 28.1 % — SIGNIFICANT CHANGE UP (ref 13–44)
MAGNESIUM SERPL-MCNC: 2.3 MG/DL — SIGNIFICANT CHANGE UP (ref 1.6–2.6)
MCHC RBC-ENTMCNC: 27.5 PG — SIGNIFICANT CHANGE UP (ref 27–34)
MCHC RBC-ENTMCNC: 34 % — SIGNIFICANT CHANGE UP (ref 32–36)
MCV RBC AUTO: 80.9 FL — SIGNIFICANT CHANGE UP (ref 80–100)
MONOCYTES # BLD AUTO: 0.65 K/UL — SIGNIFICANT CHANGE UP (ref 0–0.9)
MONOCYTES NFR BLD AUTO: 12.4 % — SIGNIFICANT CHANGE UP (ref 2–14)
NEUTROPHILS # BLD AUTO: 2.47 K/UL — SIGNIFICANT CHANGE UP (ref 1.8–7.4)
NEUTROPHILS NFR BLD AUTO: 46.9 % — SIGNIFICANT CHANGE UP (ref 43–77)
NRBC # FLD: 0 — SIGNIFICANT CHANGE UP
PLATELET # BLD AUTO: 171 K/UL — SIGNIFICANT CHANGE UP (ref 150–400)
PMV BLD: 11.2 FL — SIGNIFICANT CHANGE UP (ref 7–13)
POTASSIUM SERPL-MCNC: 4.2 MMOL/L — SIGNIFICANT CHANGE UP (ref 3.5–5.3)
POTASSIUM SERPL-SCNC: 4.2 MMOL/L — SIGNIFICANT CHANGE UP (ref 3.5–5.3)
RBC # BLD: 4.76 M/UL — SIGNIFICANT CHANGE UP (ref 4.2–5.8)
RBC # FLD: 14.6 % — HIGH (ref 10.3–14.5)
SODIUM SERPL-SCNC: 143 MMOL/L — SIGNIFICANT CHANGE UP (ref 135–145)
SODIUM UR-SCNC: 51 MEQ/L — SIGNIFICANT CHANGE UP
WBC # BLD: 5.26 K/UL — SIGNIFICANT CHANGE UP (ref 3.8–10.5)
WBC # FLD AUTO: 5.26 K/UL — SIGNIFICANT CHANGE UP (ref 3.8–10.5)

## 2017-07-27 RX ORDER — SODIUM CHLORIDE 9 MG/ML
1000 INJECTION INTRAMUSCULAR; INTRAVENOUS; SUBCUTANEOUS
Qty: 0 | Refills: 0 | Status: COMPLETED | OUTPATIENT
Start: 2017-07-27 | End: 2017-07-27

## 2017-07-27 RX ADMIN — CYCLOPENTOLATE HYDROCHLORIDE 1 DROP(S): 10 SOLUTION/ DROPS OPHTHALMIC at 17:03

## 2017-07-27 RX ADMIN — PANTOPRAZOLE SODIUM 40 MILLIGRAM(S): 20 TABLET, DELAYED RELEASE ORAL at 05:50

## 2017-07-27 RX ADMIN — SODIUM CHLORIDE 50 MILLILITER(S): 9 INJECTION INTRAMUSCULAR; INTRAVENOUS; SUBCUTANEOUS at 12:04

## 2017-07-27 RX ADMIN — SODIUM CHLORIDE 50 MILLILITER(S): 9 INJECTION INTRAMUSCULAR; INTRAVENOUS; SUBCUTANEOUS at 10:49

## 2017-07-27 RX ADMIN — Medication 2 DROP(S): at 12:04

## 2017-07-27 RX ADMIN — ATORVASTATIN CALCIUM 20 MILLIGRAM(S): 80 TABLET, FILM COATED ORAL at 22:19

## 2017-07-27 RX ADMIN — Medication 3 MILLIGRAM(S): at 22:20

## 2017-07-27 RX ADMIN — Medication 1 DROP(S): at 08:34

## 2017-07-27 RX ADMIN — SODIUM CHLORIDE 50 MILLILITER(S): 9 INJECTION INTRAMUSCULAR; INTRAVENOUS; SUBCUTANEOUS at 21:00

## 2017-07-27 RX ADMIN — BRIMONIDINE TARTRATE 2 DROP(S): 2 SOLUTION/ DROPS OPHTHALMIC at 12:03

## 2017-07-27 RX ADMIN — Medication 1 DROP(S): at 17:02

## 2017-07-27 RX ADMIN — HEPARIN SODIUM 5000 UNIT(S): 5000 INJECTION INTRAVENOUS; SUBCUTANEOUS at 17:03

## 2017-07-27 RX ADMIN — Medication 1 DROP(S): at 05:50

## 2017-07-27 RX ADMIN — Medication 1 DROP(S): at 04:17

## 2017-07-27 RX ADMIN — LATANOPROST 1 DROP(S): 0.05 SOLUTION/ DROPS OPHTHALMIC; TOPICAL at 22:19

## 2017-07-27 RX ADMIN — Medication 1 DROP(S): at 00:07

## 2017-07-27 RX ADMIN — Medication 1 DROP(S): at 12:03

## 2017-07-27 RX ADMIN — Medication 1 DROP(S): at 20:00

## 2017-07-27 RX ADMIN — HEPARIN SODIUM 5000 UNIT(S): 5000 INJECTION INTRAVENOUS; SUBCUTANEOUS at 05:50

## 2017-07-27 RX ADMIN — Medication 1 DROP(S): at 22:20

## 2017-07-27 RX ADMIN — Medication 25 MILLIGRAM(S): at 05:50

## 2017-07-27 RX ADMIN — Medication 81 MILLIGRAM(S): at 12:03

## 2017-07-27 RX ADMIN — CYCLOPENTOLATE HYDROCHLORIDE 1 DROP(S): 10 SOLUTION/ DROPS OPHTHALMIC at 05:50

## 2017-07-27 RX ADMIN — Medication 1 DROP(S): at 14:32

## 2017-07-28 VITALS
DIASTOLIC BLOOD PRESSURE: 80 MMHG | SYSTOLIC BLOOD PRESSURE: 120 MMHG | HEART RATE: 64 BPM | TEMPERATURE: 98 F | RESPIRATION RATE: 16 BRPM | OXYGEN SATURATION: 94 %

## 2017-07-28 LAB
BASOPHILS # BLD AUTO: 0.04 K/UL — SIGNIFICANT CHANGE UP (ref 0–0.2)
BASOPHILS NFR BLD AUTO: 0.8 % — SIGNIFICANT CHANGE UP (ref 0–2)
BUN SERPL-MCNC: 43 MG/DL — HIGH (ref 7–23)
CALCIUM SERPL-MCNC: 8.9 MG/DL — SIGNIFICANT CHANGE UP (ref 8.4–10.5)
CHLORIDE SERPL-SCNC: 108 MMOL/L — HIGH (ref 98–107)
CO2 SERPL-SCNC: 23 MMOL/L — SIGNIFICANT CHANGE UP (ref 22–31)
CREAT SERPL-MCNC: 2.41 MG/DL — HIGH (ref 0.5–1.3)
EOSINOPHIL # BLD AUTO: 0.48 K/UL — SIGNIFICANT CHANGE UP (ref 0–0.5)
EOSINOPHIL NFR BLD AUTO: 9.5 % — HIGH (ref 0–6)
GAS PNL BLDMV: SIGNIFICANT CHANGE UP
GLUCOSE SERPL-MCNC: 104 MG/DL — HIGH (ref 70–99)
HCT VFR BLD CALC: 36.1 % — LOW (ref 39–50)
HGB BLD-MCNC: 12 G/DL — LOW (ref 13–17)
IMM GRANULOCYTES # BLD AUTO: 0.01 # — SIGNIFICANT CHANGE UP
IMM GRANULOCYTES NFR BLD AUTO: 0.2 % — SIGNIFICANT CHANGE UP (ref 0–1.5)
LYMPHOCYTES # BLD AUTO: 1.46 K/UL — SIGNIFICANT CHANGE UP (ref 1–3.3)
LYMPHOCYTES # BLD AUTO: 28.9 % — SIGNIFICANT CHANGE UP (ref 13–44)
MAGNESIUM SERPL-MCNC: 2.4 MG/DL — SIGNIFICANT CHANGE UP (ref 1.6–2.6)
MCHC RBC-ENTMCNC: 26.2 PG — LOW (ref 27–34)
MCHC RBC-ENTMCNC: 33.2 % — SIGNIFICANT CHANGE UP (ref 32–36)
MCV RBC AUTO: 78.8 FL — LOW (ref 80–100)
MONOCYTES # BLD AUTO: 0.65 K/UL — SIGNIFICANT CHANGE UP (ref 0–0.9)
MONOCYTES NFR BLD AUTO: 12.9 % — SIGNIFICANT CHANGE UP (ref 2–14)
NEUTROPHILS # BLD AUTO: 2.41 K/UL — SIGNIFICANT CHANGE UP (ref 1.8–7.4)
NEUTROPHILS NFR BLD AUTO: 47.7 % — SIGNIFICANT CHANGE UP (ref 43–77)
NRBC # FLD: 0 — SIGNIFICANT CHANGE UP
PLATELET # BLD AUTO: 174 K/UL — SIGNIFICANT CHANGE UP (ref 150–400)
PMV BLD: 11.8 FL — SIGNIFICANT CHANGE UP (ref 7–13)
POTASSIUM SERPL-MCNC: 4.6 MMOL/L — SIGNIFICANT CHANGE UP (ref 3.5–5.3)
POTASSIUM SERPL-SCNC: 4.6 MMOL/L — SIGNIFICANT CHANGE UP (ref 3.5–5.3)
RBC # BLD: 4.58 M/UL — SIGNIFICANT CHANGE UP (ref 4.2–5.8)
RBC # FLD: 14.7 % — HIGH (ref 10.3–14.5)
SODIUM SERPL-SCNC: 143 MMOL/L — SIGNIFICANT CHANGE UP (ref 135–145)
WBC # BLD: 5.05 K/UL — SIGNIFICANT CHANGE UP (ref 3.8–10.5)
WBC # FLD AUTO: 5.05 K/UL — SIGNIFICANT CHANGE UP (ref 3.8–10.5)

## 2017-07-28 RX ORDER — FUROSEMIDE 40 MG
1 TABLET ORAL
Qty: 0 | Refills: 0 | COMMUNITY

## 2017-07-28 RX ORDER — POTASSIUM CHLORIDE 20 MEQ
1 PACKET (EA) ORAL
Qty: 0 | Refills: 0 | COMMUNITY

## 2017-07-28 RX ADMIN — Medication 1 DROP(S): at 12:37

## 2017-07-28 RX ADMIN — Medication 1 DROP(S): at 04:00

## 2017-07-28 RX ADMIN — Medication 25 MILLIGRAM(S): at 05:59

## 2017-07-28 RX ADMIN — Medication 1 DROP(S): at 06:01

## 2017-07-28 RX ADMIN — PANTOPRAZOLE SODIUM 40 MILLIGRAM(S): 20 TABLET, DELAYED RELEASE ORAL at 06:01

## 2017-07-28 RX ADMIN — Medication 81 MILLIGRAM(S): at 12:37

## 2017-07-28 RX ADMIN — Medication 1 DROP(S): at 00:27

## 2017-07-28 RX ADMIN — BRIMONIDINE TARTRATE 2 DROP(S): 2 SOLUTION/ DROPS OPHTHALMIC at 12:38

## 2017-07-28 RX ADMIN — HEPARIN SODIUM 5000 UNIT(S): 5000 INJECTION INTRAVENOUS; SUBCUTANEOUS at 05:59

## 2017-07-28 RX ADMIN — CYCLOPENTOLATE HYDROCHLORIDE 1 DROP(S): 10 SOLUTION/ DROPS OPHTHALMIC at 04:00

## 2017-07-28 RX ADMIN — Medication 2 DROP(S): at 12:37

## 2017-07-28 RX ADMIN — Medication 1 DROP(S): at 14:20

## 2017-07-28 NOTE — PROGRESS NOTE ADULT - PROBLEM SELECTOR PROBLEM 3
Benign hypertensive CKD, stage 1-4 or unspecified chronic kidney disease

## 2017-07-28 NOTE — PROGRESS NOTE ADULT - PROBLEM SELECTOR PLAN 3
BP borderline. Can add low dose CCB as needed. Monitor BP.
BP controlled. Continue with current medications and low sodium diet. Monitor BP.
BP low normal. Ok to continue with low dose metoprolol for now. Monitor BP.
BP low normal. Ok to continue with low dose metoprolol for now. Monitor BP.
BP borderline. Can add low dose CCB as needed. Monitor BP.

## 2017-07-28 NOTE — PROGRESS NOTE ADULT - PROBLEM SELECTOR PLAN 1
Thought to be hemodynamically mediated in setting of hypertensive urgency. Scr now rising for which lasix discontinued on 7/25. Encourage PO intake. Avoid nephrotoxins.
Thought to be hemodynamically mediated in setting of diuretic therapy for which lasix was discontinued and patient given gentle IVF overnight. Scr today stable and near baseline. Encourage PO intake. Avoid nephrotoxins.
Thought to be hemodynamically mediated in setting of hypertensive urgency. FRANK now resolving. Avoid nephrotoxins.
Thought to be hemodynamically mediated in setting of hypertensive urgency. Scr now rising for which lasix discontinued on 7/25. Check urine sodium and urine creatinine. Encourage PO intake. Start a trial of IVF (NS @ 50 ml/hour X 10 hours) as patient appears dry. Check bladder scan. Avoid nephrotoxins.
Thought to be hemodynamically mediated in setting of hypertensive urgency. FRANK now resolving. Avoid nephrotoxins.

## 2017-07-28 NOTE — PROGRESS NOTE ADULT - PROBLEM SELECTOR PROBLEM 2
CKD (chronic kidney disease) stage 3, GFR 30-59 ml/min

## 2017-07-28 NOTE — PROGRESS NOTE ADULT - PROBLEM SELECTOR PROBLEM 5
Benign prostatic hyperplasia with lower urinary tract symptoms, unspecified morphology

## 2017-07-28 NOTE — PROGRESS NOTE ADULT - SUBJECTIVE AND OBJECTIVE BOX
[facility] NEPHROLOGY- PROGRESS NOTE    84 year old male with h/o CKD-3, BPH presents with chest pain. Nephrology consulted for FRANK on CKD-3.    REVIEW OF SYSTEMS:  Gen: no changes in weight  Cards: no chest pain  Resp: no dyspnea  GI: no nausea or vomiting or diarrhea  Vascular: no LE edema    No Known Allergies      Hospital Medications: Medications reviewed      VITALS:  T(F): 97.5 (07-25-17 @ 05:31), Max: 97.5 (07-24-17 @ 13:57)  HR: 56 (07-25-17 @ 05:31)  BP: 147/92 (07-25-17 @ 05:31)  RR: 16 (07-25-17 @ 05:31)  SpO2: 100% (07-25-17 @ 05:31)  Wt(kg): --    07-24 @ 07:01  -  07-25 @ 07:00  --------------------------------------------------------  IN: 714 mL / OUT: 0 mL / NET: 714 mL      PHYSICAL EXAM:    Gen: NAD, calm  Cards: RRR, +S1/S2, no M/G/R  Resp: CTA B/L  GI: soft, NT/ND, NABS  Vascular: no LE edema B/L      LABS:  07-25    139  |  103  |  29<H>  ----------------------------<  75  5.0   |  18<L>  |  2.19<H>    Ca    9.4      25 Jul 2017 03:00  Mg     2.2     07-25    TPro  8.0  /  Alb      /  TBili      /  DBili      /  AST      /  ALT      /  AlkPhos      07-25    Creatinine Trend: 2.19 <--, 1.96 <--, 1.88 <--, 2.14 <--, 2.33 <--                        13.1   5.68  )-----------( 172      ( 25 Jul 2017 03:00 )             38.8
[facility] NEPHROLOGY- PROGRESS NOTE    84 year old male with h/o CKD-3, BPH presents with chest pain. Nephrology consulted for FRANK on CKD-3.    REVIEW OF SYSTEMS:  Gen: no changes in weight  Cards: no chest pain  Resp: no dyspnea  GI: no nausea or vomiting or diarrhea  Vascular: no LE edema    No Known Allergies      Hospital Medications: Medications reviewed      VITALS:  T(F): 97.5 (07-27-17 @ 05:49), Max: 98.7 (07-26-17 @ 14:07)  HR: 68 (07-27-17 @ 05:49)  BP: 115/83 (07-27-17 @ 05:49)  RR: 16 (07-27-17 @ 05:49)  SpO2: 100% (07-27-17 @ 05:49)  Wt(kg): --    07-26 @ 07:01  -  07-27 @ 07:00  --------------------------------------------------------  IN: 654 mL / OUT: 0 mL / NET: 654 mL      PHYSICAL EXAM:    Gen: NAD, calm  Cards: RRR, +S1/S2, no M/G/R  Resp: CTA B/L  GI: soft, NT/ND, NABS  Vascular: no LE edema B/L      LABS:  07-27    143  |  106  |  41<H>  ----------------------------<  84  4.2   |  21<L>  |  2.45<H>    Ca    9.1      27 Jul 2017 06:00  Mg     2.3     07-27      Creatinine Trend: 2.45 <--, 2.40 <--, 2.19 <--, 1.96 <--, 1.88 <--, 2.14 <--, 2.33 <--                        13.1   5.26  )-----------( 171      ( 27 Jul 2017 06:00 )             38.5
[facility] NEPHROLOGY- PROGRESS NOTE    84 year old male with h/o CKD-3, BPH presents with chest pain. Nephrology consulted for FRANK on CKD-3.    REVIEW OF SYSTEMS:  Gen: no changes in weight  Cards: no chest pain  Resp: no dyspnea  GI: no nausea or vomiting or diarrhea  Vascular: no LE edema    No Known Allergies      Hospital Medications: Medications reviewed      VITALS:  T(F): 98.4 (17 @ 05:33), Max: 98.4 (17 @ 19:47)  HR: 60 (17 @ 05:33)  BP: 114/73 (17 @ 05:33)  RR: 12 (17 @ 05:33)  SpO2: 96% (17 @ 05:33)  Wt(kg): --     @ 07:01  -   @ 07:00  --------------------------------------------------------  IN: 240 mL / OUT: 0 mL / NET: 240 mL      PHYSICAL EXAM:    Gen: NAD, calm  Cards: RRR, +S1/S2, no M/G/R  Resp: CTA B/L  GI: soft, NT/ND, NABS  Vascular: no LE edema B/L      LABS:      141  |  104  |  35<H>  ----------------------------<  89  3.8   |  22  |  2.40<H>    Ca    9.0      2017 06:45  Mg     2.1         TPro  8.0  /  Alb      /  TBili      /  DBili      /  AST      /  ALT      /  AlkPhos          Creatinine Trend: 2.40 <--, 2.19 <--, 1.96 <--, 1.88 <--, 2.14 <--, 2.33 <--                        12.8   5.07  )-----------( 155      ( 2017 06:45 )             38.5     Urine Studies:  Urinalysis Basic - ( 2017 03:38 )    Color: YELLOW / Appearance: CLEAR / S.012 / pH: 7.0  Gluc: NEGATIVE / Ketone: NEGATIVE  / Bili: NEGATIVE / Urobili: NORMAL E.U.   Blood: MODERATE / Protein: 20 / Nitrite: NEGATIVE   Leuk Esterase: SMALL / RBC: >50 / WBC 2-5   Sq Epi: OCC / Non Sq Epi:  / Bacteria:       Sodium, Random Urine: 101 meq/L ( @ 03:38)  Potassium, Random Urine: 33.8 meq/L ( @ 03:38)  Chloride, Random Urine: 74 mmol/L ( @ 03:38)  Protein, Random Urine: 23.1 mg/dL ( @ 03:38)  Creatinine, Random Urine: 91.34 mg/dL ( @ 03:38)
[facility] NEPHROLOGY- PROGRESS NOTE    84 year old male with h/o CKD-3, BPH presents with chest pain. Nephrology consulted for FRANK on CKD-3.    REVIEW OF SYSTEMS:  Gen: no changes in weight  Cards: no chest pain  Resp: no dyspnea  GI: no nausea or vomiting or diarrhea  Vascular: no LE edema    No Known Allergies      Hospital Medications: Medications reviewed      VITALS:  T(F): 97.9 (07-28-17 @ 06:27), Max: 98.8 (07-27-17 @ 13:35)  HR: 64 (07-28-17 @ 06:27)  BP: 136/87 (07-28-17 @ 06:27)  RR: 13 (07-28-17 @ 06:27)  SpO2: 97% (07-28-17 @ 06:27)  Wt(kg): --    07-27 @ 07:01  -  07-28 @ 07:00  --------------------------------------------------------  IN: 780 mL / OUT: 0 mL / NET: 780 mL    07-28 @ 07:01  -  07-28 @ 12:07  --------------------------------------------------------  IN: 120 mL / OUT: 0 mL / NET: 120 mL      PHYSICAL EXAM:    Gen: NAD, calm  Cards: RRR, +S1/S2, no M/G/R  Resp: CTA B/L  GI: soft, NT/ND, NABS  Vascular: no LE edema B/L      LABS:  07-28    143  |  108<H>  |  43<H>  ----------------------------<  104<H>  4.6   |  23  |  2.41<H>    Ca    8.9      28 Jul 2017 04:00  Mg     2.4     07-28      Creatinine Trend: 2.41 <--, 2.45 <--, 2.40 <--, 2.19 <--, 1.96 <--, 1.88 <--, 2.14 <--, 2.33 <--                        12.0   5.05  )-----------( 174      ( 28 Jul 2017 04:00 )             36.1
SUBJ:84 year old male PMH- CVA, BPH, GERD, CKD, HTN p/w left sided chest pain,no dyspnea,no recent ischemic work-up as per PCP-had NST-no ischemia,discharge held for IVF,awake alert, for discharge to SNF today      MEDICATIONS  (STANDING):  aspirin  chewable 81 milliGRAM(s) Oral daily  atorvastatin 20 milliGRAM(s) Oral at bedtime  cyclopentolate 2% Solution 1 Drop(s) Left EYE two times a day  latanoprost 0.005% Ophthalmic Solution 1 Drop(s) Right EYE at bedtime  pantoprazole    Tablet 40 milliGRAM(s) Oral before breakfast  ofloxacin 0.3% Solution 1 Drop(s) Left EYE three times a day  brimonidine 0.2% Ophthalmic Solution 2 Drop(s) Right EYE daily  timolol 0.5% Solution 2 Drop(s) Right EYE daily  heparin  Injectable 5000 Unit(s) SubCutaneous every 12 hours  metoprolol 25 milliGRAM(s) Oral two times a day  prednisoLONE acetate 1% Suspension 1 Drop(s) Right EYE every 4 hours    MEDICATIONS  (PRN):  melatonin 3 milliGRAM(s) Oral at bedtime PRN Insomnia            Vital Signs Last 24 Hrs  T(C): 36.6 (28 Jul 2017 06:27), Max: 37.1 (27 Jul 2017 13:35)  T(F): 97.9 (28 Jul 2017 06:27), Max: 98.8 (27 Jul 2017 13:35)  HR: 64 (28 Jul 2017 06:27) (59 - 78)  BP: 136/87 (28 Jul 2017 06:27) (91/63 - 136/87)  BP(mean): --  RR: 13 (28 Jul 2017 06:27) (13 - 17)  SpO2: 97% (28 Jul 2017 06:27) (95% - 97%)        PHYSICAL EXAM:    CONSTITUTIONAL:BMI-19 Awake alert,forgerful .  EYES:Anicteric [x ]EOMI.  ENMT:No oral lesions.  NECK:[ ]Bruits [ ]Thyroid [ ]JVD.  RESPIRATORY:Equal air entry bilaterally,[x ]Clear to auscultation[ ]Rales[ ]Rhonchi[ ]Wheeze.  CARDIOVASCULAR:Regular rhythm[ ]Rub[x ]Murmur-3/6 systolic              [x ]Normal PMI.  GASTROINTESTINAL:[x ]BS[ ]Tenderness[ ]Reboumd[ ]Ridgitity[ ]Organomegaly.  EXTREMITIES:[ ]Clubbing[ ]Cyanosis[ ]Edema.  VASCULAR:[x ]Pulses intact and symmetrical.  NEUROLOGICAL:Alert &OrientetedX 2[x ]Normal strength[x ]NoFocal deficit.  SKIN:[ ]Lesions[ ]Rash.  MUSCULOSKEKETAL:[ ]Calf tenderness[ ]Joint abnormalities.    LABS:                        12.0   5.05  )-----------( 174      ( 28 Jul 2017 04:00 )             36.1     07-28    143  |  108<H>  |  43<H>  ----------------------------<  104<H>  4.6   |  23  |  2.41<----- 2.45 <----- 2.40    Ca    8.9      28 Jul 2017 04:00  Mg     2.4     07-28    I&O's Summary    26 Jul 2017 07:01  -  27 Jul 2017 07:00  --------------------------------------------------------  IN: 654 mL / OUT: 0 mL / NET: 654 mL    27 Jul 2017 07:01  -  28 Jul 2017 06:58  --------------------------------------------------------  IN: 780 mL / OUT: 0 mL / NET: 780 mL        IMPRESSION AND PLAN:      Problem/Plan - 1:  ·  Problem: R/O ACS (acute coronary syndrome).  Plan: Admit to tele serial CE and EKG-no evidence of cardiac injury-Non ischemic NST-normal LV function.  Problem/Plan - 2:  ·  Problem: Essential hypertension s/p hypertensive crisis.  Plan: BP stable would hold lasix-pt clinically dry recieved IVF, stable for discharge today  Problem/Plan - 3:  ·  Problem: Chronic kidney disease.  Plan: baseline Cr-~2.5-3.0 monitor Renal consult noted creatinine stable     Problem/Plan - 4:  ·  Problem: Prophylactic measure.  Plan: hep SQ for DVT PPX..
SUBJ:84 year old male PMH- CVA, BPH, GERD, CKD, HTN p/w left sided chest pain,no dyspnea,no recent ischemic work-up as per PCP-had NST-no ischemia,discharge planning for possible Rehab,      MEDICATIONS  (STANDING):  aspirin  chewable 81 milliGRAM(s) Oral daily  atorvastatin 20 milliGRAM(s) Oral at bedtime  cyclopentolate 2% Solution 1 Drop(s) Left EYE two times a day  latanoprost 0.005% Ophthalmic Solution 1 Drop(s) Right EYE at bedtime  pantoprazole    Tablet 40 milliGRAM(s) Oral before breakfast  ofloxacin 0.3% Solution 1 Drop(s) Left EYE three times a day  brimonidine 0.2% Ophthalmic Solution 2 Drop(s) Right EYE daily  timolol 0.5% Solution 2 Drop(s) Right EYE daily  heparin  Injectable 5000 Unit(s) SubCutaneous every 12 hours  metoprolol 25 milliGRAM(s) Oral two times a day  prednisoLONE acetate 1% Suspension 1 Drop(s) Right EYE every 4 hours    MEDICATIONS  (PRN):  melatonin 3 milliGRAM(s) Oral at bedtime PRN Insomnia            Vital Signs Last 24 Hrs  T(C): 36.9 (26 Jul 2017 05:33), Max: 36.9 (25 Jul 2017 19:47)  T(F): 98.4 (26 Jul 2017 05:33), Max: 98.4 (25 Jul 2017 19:47)  HR: 60 (26 Jul 2017 05:33) (54 - 70)  BP: 114/73 (26 Jul 2017 05:33) (100/73 - 122/80)  BP(mean): --  RR: 12 (26 Jul 2017 05:33) (12 - 18)  SpO2: 96% (26 Jul 2017 05:33) (96% - 100%)      PHYSICAL EXAM:    CONSTITUTIONAL:BMI-19 Awake alert,forgerful .  EYES:Anicteric [x ]EOMI.  ENMT:No oral lesions.  NECK:[ ]Bruits [ ]Thyroid [ ]JVD.  RESPIRATORY:Equal air entry bilaterally,[x ]Clear to auscultation[ ]Rales[ ]Rhonchi[ ]Wheeze.  CARDIOVASCULAR:Regular rhythm[ ]Rub[x ]Murmur-3/6 systolic              [x ]Normal PMI.  GASTROINTESTINAL:[x ]BS[ ]Tenderness[ ]Reboumd[ ]Ridgitity[ ]Organomegaly.  EXTREMITIES:[ ]Clubbing[ ]Cyanosis[ ]Edema.  VASCULAR:[x ]Pulses intact and symmetrical.  NEUROLOGICAL:Alert &OrientetedX 2[x ]Normal strength[x ]NoFocal deficit.  SKIN:[ ]Lesions[ ]Rash.  MUSCULOSKEKETAL:[ ]Calf tenderness[ ]Joint abnormalities.  TELEMETRY:    STRESS:There is a medium sized, mild defect in inferior wall that is fixed, suggestive of infarct.LVEF = 68 revealing normal LV function.      TTE:Normal left ventricular systolic function. No segmental  wall motion abnormalities.Ejection Fraction (Teicholtz): 75 %    LABS:                        12.8   5.07  )-----------( 155      ( 26 Jul 2017 06:45 )             38.5     07-26    141  |  104  |  35<H>  ----------------------------<  89  3.8   |  22  |  2.40   <-----2.19  <----- 1.96    Ca    9.0      26 Jul 2017 06:45  Mg     2.1     07-26    TPro  8.0  /  Alb  x   /  TBili  x   /  DBili  x   /  AST  x   /  ALT  x   /  AlkPhos  x   07-25      I&O's Summary    25 Jul 2017 07:01  -  26 Jul 2017 07:00  --------------------------------------------------------  IN: 240 mL / OUT: 0 mL / NET: 240 mL        IMPRESSION AND PLAN:      Problem/Plan - 1:  ·  Problem: R/O ACS (acute coronary syndrome).  Plan: Admit to tele serial CE and EKG-no evidence of cardiac injury-Non ischemic NST-normal LV function.  Problem/Plan - 2:  ·  Problem: Essential hypertension s/p hypertensive crisis.  Plan: BP stable would hold lasix-pt clinically euvolemic,add Amlodipine 5mg daily if BP uncontrolled    Problem/Plan - 3:  ·  Problem: Chronic kidney disease.  Plan: baseline Cr-~2.5-3.0 monitor Renal consult noted creatinine stable     Problem/Plan - 4:  ·  Problem: Prophylactic measure.  Plan: hep SQ for DVT PPX..     Discharge Planning-Consideration for STR
SUBJ:84 year old male PMH- CVA, BPH, GERD, CKD, HTN p/w left sided chest pain,no dyspnea,no recent ischemic work-up as per PCP-scheduled for NST       MEDICATIONS  (STANDING):  aspirin  chewable 81 milliGRAM(s) Oral daily  atorvastatin 20 milliGRAM(s) Oral at bedtime  cyclopentolate 2% Solution 1 Drop(s) Left EYE two times a day  latanoprost 0.005% Ophthalmic Solution 1 Drop(s) Right EYE at bedtime  pantoprazole    Tablet 40 milliGRAM(s) Oral before breakfast  ofloxacin 0.3% Solution 1 Drop(s) Left EYE three times a day  brimonidine 0.2% Ophthalmic Solution 2 Drop(s) Right EYE daily  timolol 0.5% Solution 2 Drop(s) Right EYE daily  difluprednate 0.05% Ophthalmic Emulsion 1 Drop(s) Right EYE every 4 hours  furosemide    Tablet 40 milliGRAM(s) Oral daily  heparin  Injectable 5000 Unit(s) SubCutaneous every 12 hours  metoprolol 25 milliGRAM(s) Oral two times a day    MEDICATIONS  (PRN):  melatonin 3 milliGRAM(s) Oral at bedtime PRN Insomnia      Vital Signs Last 24 Hrs  T(C): 36.4 (25 Jul 2017 05:31), Max: 36.4 (24 Jul 2017 13:57)  T(F): 97.5 (25 Jul 2017 05:31), Max: 97.5 (24 Jul 2017 13:57)  HR: 56 (25 Jul 2017 05:31) (56 - 60)  BP: 147/92 (25 Jul 2017 05:31) (140/88 - 147/92)  RR: 16 (25 Jul 2017 05:31) (16 - 18)  SpO2: 100% (25 Jul 2017 05:31) (100% - 100%)    PHYSICAL EXAM:    CONSTITUTIONAL:BMI-19 Awake alert .  EYES:Anicteric [x ]EOMI.  ENMT:No oral lesions.  NECK:[ ]Bruits [ ]Thyroid [ ]JVD.  RESPIRATORY:Equal air entry bilaterally,[x ]Clear to auscultation[ ]Rales[ ]Rhonchi[ ]Wheeze.  CARDIOVASCULAR:Regular rhythm[ ]Rub[x ]Murmur-3/6 systolic              [x ]Normal PMI.  GASTROINTESTINAL:[x ]BS[ ]Tenderness[ ]Reboumd[ ]Ridgitity[ ]Organomegaly.  EXTREMITIES:[ ]Clubbing[ ]Cyanosis[ ]Edema.  VASCULAR:[x ]Pulses intact and symmetrical.  NEUROLOGICAL:Alert &OrientetedX 3[x ]Normal strength[x ]NoFocal deficit.  SKIN:[ ]Lesions[ ]Rash.  MUSCULOSKEKETAL:[ ]Calf tenderness[ ]Joint abnormalities.    TTE:Normal left ventricular systolic function. No segmental  wall motion abnormalities.Ejection Fraction (Teicholtz): 75 %  -------------------------------------------    LABS:                        13.1   5.68  )-----------( 172      ( 25 Jul 2017 03:00 )             38.8     07-25    139  |  103  |  29<H>  ----------------------------<  75  5.0   |  18<L>  |  2.19  <-----1.99    Ca    9.4      25 Jul 2017 03:00  Mg     2.2     07-25    TPro  8.0  /  Alb  x   /  TBili  x   /  DBili  x   /  AST  x   /  ALT  x   /  AlkPhos  x   07-25      I&O's Summary    24 Jul 2017 07:01  -  25 Jul 2017 07:00  --------------------------------------------------------  IN: 714 mL / OUT: 0 mL / NET: 714 mL      IMPRESSION AND PLAN:    Problem/Plan - 1:  ·  Problem: R/O ACS (acute coronary syndrome).  Plan: Admit to tele serial CE and EKG-no evidence of cardiac injury-for NST today    Problem/Plan - 2:  ·  Problem: Essential hypertension s/p hypertensive crisis.  Plan: BP stable would hold lasix-pt clinically euvolemic,add Amlodipine 5mg daily    Problem/Plan - 3:  ·  Problem: Chronic kidney disease.  Plan: baseline Cr-~2.5-3.0 monitor Renal consult noted creatinine stable     Problem/Plan - 4:  ·  Problem: Prophylactic measure.  Plan: hep SQ for DVT PPX..     Discharge Planning-Home if NST-non ischemic
SUBJ:84 year old male pmh CVA, BPH, GERD, CKD, HTN p/w left sided chest pain,had stress test with PMD few weeks ago -results unknown,remains chest pain free.      MEDICATIONS  (STANDING):  aspirin  chewable 81 milliGRAM(s) Oral daily  atorvastatin 20 milliGRAM(s) Oral at bedtime  cyclopentolate 2% Solution 1 Drop(s) Left EYE two times a day  latanoprost 0.005% Ophthalmic Solution 1 Drop(s) Right EYE at bedtime  pantoprazole    Tablet 40 milliGRAM(s) Oral before breakfast  ofloxacin 0.3% Solution 1 Drop(s) Left EYE three times a day  brimonidine 0.2% Ophthalmic Solution 2 Drop(s) Right EYE daily  timolol 0.5% Solution 2 Drop(s) Right EYE daily  difluprednate 0.05% Ophthalmic Emulsion 1 Drop(s) Right EYE every 4 hours  furosemide    Tablet 40 milliGRAM(s) Oral daily  heparin  Injectable 5000 Unit(s) SubCutaneous every 12 hours    MEDICATIONS  (PRN):            Vital Signs Last 24 Hrs  T(C): 37.1 (23 Jul 2017 05:37), Max: 37.1 (23 Jul 2017 05:37)  T(F): 98.8 (23 Jul 2017 05:37), Max: 98.8 (23 Jul 2017 05:37)  HR: 76 (23 Jul 2017 05:37) (63 - 79)  BP: 136/92 (23 Jul 2017 05:37) (86/58 - 166/102)  BP(mean): --  RR: 18 (23 Jul 2017 05:37) (14 - 18)  SpO2: 98% (23 Jul 2017 05:37) (96% - 100%)    REVIEW OF SYSTEMS:  Denies any SOB, abd pain, fever, chills, nausea, vomiting, constipation, diarrhea, dysuria, pyuria, back pain, trauma.No orthopnea PND or palpitations.      PHYSICAL EXAM:  CONSTITUTIONAL:BMI-19  .  EYES:Anicteric [x ]EOMI.  ENMT:No oral lesions.  NECK:[ ]Bruits [ ]Thyroid [ ]JVD.  RESPIRATORY:Equal air entry bilaterally,[x ]Clear to auscultation[ ]Rales[ ]Rhonchi[ ]Wheeze.  CARDIOVASCULAR:Regular rhythm[ ]Rub[x ]Murmur-3/6 systolic              [x ]Normal PMI.  GASTROINTESTINAL:[x ]BS[ ]Tenderness[ ]Reboumd[ ]Ridgitity[ ]Organomegaly.  EXTREMITIES:[ ]Clubbing[ ]Cyanosis[ ]Edema.  VASCULAR:[x ]Pulses intact and symmetrical.  NEUROLOGICAL:Alert &OrientetedX 3[ ]Normal strength[ ]NoFocal deficit.  SKIN:[ ]Lesions[ ]Rash.  MUSCULOSKEKETAL:[ ]Calf tenderness[ ]Joint abnormalities.   	  TELEMETRY:Sinus rhythm no arrhythmias    ECG:Normal sinus rhythm  Nonspecific ST abnormality    TTE: Study Date: 2/27/2017Normal left ventricular systolic function. No segmental wall motion abnormalities.Ejection Fraction (Teicholtz): 75 %        LABS:                        10.8   5.22  )-----------( 160      ( 22 Jul 2017 14:10 )             32.7     07-22    143  |  106  |  29<H>  ----------------------------<  88  4.6   |  24  |  2.14<H>    Ca    9.1      22 Jul 2017 22:00  Phos  2.7     07-22  Mg     2.3     07-22    TPro  7.8  /  Alb  3.9  /  TBili  0.5  /  DBili  x   /  AST  17  /  ALT  16  /  AlkPhos  85  07-22    CARDIAC MARKERS ( 22 Jul 2017 22:00 )  x     / < 0.06 ng/mL / 163 u/L / 2.56 ng/mL / x      CARDIAC MARKERS ( 22 Jul 2017 14:10 )  x     / < 0.06 ng/mL / 113 u/L / 2.03 ng/mL / x          PT/INR - ( 22 Jul 2017 14:10 )   PT: 12.6 SEC;   INR: 1.12          PTT - ( 22 Jul 2017 14:10 )  PTT:31.7 SEC    I&O's Summary    IMPRESSION AND PLAN:      84 year old male pmh CVA, BPH, GERD, CKD, HTN p/w left sided chest pain to R/O ACS.     Problem/Plan - 1:  ·  Problem: R/O ACS (acute coronary syndrome).  Plan: Admit to tele  serial CE and EKG-no evidence of cardiac injury  Get old records from PMD otherwise consider ischemic eval.     Problem/Plan - 2:  ·  Problem: Essential hypertension.  Plan: Continue lasix  dash diet.     Problem/Plan - 3:  ·  Problem: Chronic kidney disease.  Plan: baseline Cr-~2.5-3.0  monitor Renal consult-Dr Stevens called    Problem/Plan - 4:  ·  Problem: Prophylactic measure.  Plan: hep SQ for DVT PPX..
SUBJ:84 year old male pmh CVA, BPH, GERD, CKD, HTN p/w left sided chest pain,had stress test with PMD few weeks ago -results unknown,remains chest pain free.      MEDICATIONS  (STANDING):  aspirin  chewable 81 milliGRAM(s) Oral daily  atorvastatin 20 milliGRAM(s) Oral at bedtime  cyclopentolate 2% Solution 1 Drop(s) Left EYE two times a day  latanoprost 0.005% Ophthalmic Solution 1 Drop(s) Right EYE at bedtime  pantoprazole    Tablet 40 milliGRAM(s) Oral before breakfast  ofloxacin 0.3% Solution 1 Drop(s) Left EYE three times a day  brimonidine 0.2% Ophthalmic Solution 2 Drop(s) Right EYE daily  timolol 0.5% Solution 2 Drop(s) Right EYE daily  difluprednate 0.05% Ophthalmic Emulsion 1 Drop(s) Right EYE every 4 hours  furosemide    Tablet 40 milliGRAM(s) Oral daily  heparin  Injectable 5000 Unit(s) SubCutaneous every 12 hours  metoprolol 25 milliGRAM(s) Oral two times a day    MEDICATIONS  (PRN):  melatonin 3 milliGRAM(s) Oral at bedtime PRN Insomnia      Vital Signs Last 24 Hrs  T(C): 36.7 (24 Jul 2017 05:14), Max: 37 (23 Jul 2017 13:25)  T(F): 98 (24 Jul 2017 05:14), Max: 98.6 (23 Jul 2017 13:25)  HR: 62 (24 Jul 2017 05:14) (62 - 95)  BP: 139/89 (24 Jul 2017 05:14) (134/95 - 146/106)  RR: 16 (24 Jul 2017 05:14) (16 - 18)  SpO2: 100% (24 Jul 2017 05:14) (100% - 100%)      PHYSICAL EXAM:  CONSTITUTIONAL:BMI-19  .  EYES:Anicteric [x ]EOMI.  ENMT:No oral lesions.  NECK:[ ]Bruits [ ]Thyroid [ ]JVD.  RESPIRATORY:Equal air entry bilaterally,[x ]Clear to auscultation[ ]Rales[ ]Rhonchi[ ]Wheeze.  CARDIOVASCULAR:Regular rhythm[ ]Rub[x ]Murmur-3/6 systolic              [x ]Normal PMI.  GASTROINTESTINAL:[x ]BS[ ]Tenderness[ ]Reboumd[ ]Ridgitity[ ]Organomegaly.  EXTREMITIES:[ ]Clubbing[ ]Cyanosis[ ]Edema.  VASCULAR:[x ]Pulses intact and symmetrical.  NEUROLOGICAL:Alert &OrientetedX 3[x ]Normal strength[x ]NoFocal deficit.  SKIN:[ ]Lesions[ ]Rash.  MUSCULOSKEKETAL:[ ]Calf tenderness[ ]Joint abnormalities.    TELEMETRY:    ECG:Normal sinus rhythm Normal ECG    TTE:Study Date: 2/27/2017--Normal left ventricular systolic function. No segmental wall motion abnormalities.Ejection Fraction (Teicholtz): 75 %.Mild-moderate aortic regurgitation.Normal right ventricular size and function.      LABS:                        12.0   5.34  )-----------( 155      ( 23 Jul 2017 07:20 )             35.5     07-23    142  |  107  |  26<H>  ----------------------------<  81  4.5   |  22  |  1.88   <---- 2.33    Ca    8.9      23 Jul 2017 07:20  Phos  2.7     07-22  Mg     2.3     07-22    TPro  7.8  /  Alb  3.9  /  TBili  0.5  /  DBili  x   /  AST  17  /  ALT  16  /  AlkPhos  85  07-22    CARDIAC MARKERS ( 22 Jul 2017 22:00 )  x     / < 0.06 ng/mL / 163 u/L / 2.56 ng/mL / x      CARDIAC MARKERS ( 22 Jul 2017 14:10 )  x     / < 0.06 ng/mL / 113 u/L / 2.03 ng/mL / x          PT/INR - ( 22 Jul 2017 14:10 )   PT: 12.6 SEC;   INR: 1.12       PTT - ( 22 Jul 2017 14:10 )  PTT:31.7 SEC    I&O's Summary    23 Jul 2017 07:01  -  24 Jul 2017 07:00  --------------------------------------------------------  IN: 414 mL / OUT: 0 mL / NET: 414 mL    RADIOLOGY & ADDITIONAL STUDIES:RAD CHEST AP PA Linear opacities in the right lung likely subsegmental atelectasis. No  focal consolidations.Small loculated right pleural effusion. No pneumothorax.       IMPRESSION AND PLAN:    Problem/Plan - 1:  ·  Problem: R/O ACS (acute coronary syndrome).  Plan: Admit to tele serial CE and EKG-no evidence of cardiac injury  Get old records from PMD otherwise consider repeating ischemic eval.     Problem/Plan - 2:  ·  Problem: Essential hypertension.  Plan: Continue lasix dash diet.     Problem/Plan - 3:  ·  Problem: Chronic kidney disease.  Plan: baseline Cr-~2.5-3.0 monitor Renal consult noted creatinine improved.    Problem/Plan - 4:  ·  Problem: Prophylactic measure.  Plan: hep SQ for DVT PPX..
[facility] NEPHROLOGY- PROGRESS NOTE    84 year old male with h/o CKD-3, BPH presents with chest pain. Nephrology consulted for FRANK on CKD-3.    REVIEW OF SYSTEMS:  Gen: no changes in weight  Cards: no chest pain  Resp: no dyspnea  GI: no nausea or vomiting or diarrhea  Vascular: no LE edema    No Known Allergies      Hospital Medications: Medications reviewed    VITALS:  T(F): 97.5 (17 @ 13:57), Max: 98.3 (17 @ 17:26)  HR: 58 (17 @ 13:57)  BP: 145/96 (17 @ 13:57)  RR: 17 (17 @ 13:57)  SpO2: 100% (17 @ 13:57)  Wt(kg): --  Height (cm): 177.8 ( 21:18)  Weight (kg): 59.1 ( 21:18)  BMI (kg/m2): 18.7 ( 21:18)  BSA (m2): 1.74 ( 21:18)     @ 07:  -   @ 07:00  --------------------------------------------------------  IN: 414 mL / OUT: 0 mL / NET: 414 mL     @ 07:01  -   @ 14:02  --------------------------------------------------------  IN: 300 mL / OUT: 0 mL / NET: 300 mL        PHYSICAL EXAM:    Gen: NAD, calm  Cards: RRR, +S1/S2, no M/G/R  Resp: CTA B/L  GI: soft, NT/ND, NABS  Vascular: no LE edema B/L      LABS:      142  |  106  |  25<H>  ----------------------------<  83  4.7   |  21<L>  |  1.96<H>    Ca    8.7      2017 06:45  Phos  2.7       Mg     2.3         TPro  7.8  /  Alb  3.9  /  TBili  0.5  /  DBili      /  AST  17  /  ALT  16  /  AlkPhos  85      Creatinine Trend: 1.96 <--, 1.88 <--, 2.14 <--, 2.33 <--                        12.0   5.62  )-----------( 153      ( 2017 06:45 )             34.9     Urine Studies:  Urinalysis Basic - ( 2017 03:38 )    Color: YELLOW / Appearance: CLEAR / S.012 / pH: 7.0  Gluc: NEGATIVE / Ketone: NEGATIVE  / Bili: NEGATIVE / Urobili: NORMAL E.U.   Blood: MODERATE / Protein: 20 / Nitrite: NEGATIVE   Leuk Esterase: SMALL / RBC: >50 / WBC 2-5   Sq Epi: OCC / Non Sq Epi:  / Bacteria:       Sodium, Random Urine: 101 meq/L ( @ 03:38)  Potassium, Random Urine: 33.8 meq/L ( @ 03:38)  Chloride, Random Urine: 74 mmol/L ( @ 03:38)  Protein, Random Urine: 23.1 mg/dL ( @ 03:38)  Creatinine, Random Urine: 91.34 mg/dL ( @ 03:38)

## 2017-07-28 NOTE — PROGRESS NOTE ADULT - PROBLEM SELECTOR PROBLEM 1
FRANK (acute kidney injury)

## 2017-07-28 NOTE — PROGRESS NOTE ADULT - PROBLEM SELECTOR PLAN 4
Patient appears dry. Discontinue furosemide 40 mg daily. Monitor UO.
Patient appears dry. Monitor off of diuretic therapy.
Patient appears dry. Monitor off of diuretic therapy.
Patient does not appear volume overloaded. Monitor off of diuretic therapy.
Patient appears euvolemic. Ok to continue with lasix as prescribed. Monitor UO.

## 2017-07-28 NOTE — PROGRESS NOTE ADULT - ATTENDING COMMENTS
[facility] NEPHROLOGY  Surinder Stevens M.D.  Migel Parikh D.O.  Yusra Lambert M.D.  Yanira Brooks, MSN, ANP-C  [phone]    [address] [place] [address]
Deejay Montalvo MD  [address] [place], [place] [phone]
Deejay Montalvo MD  [address] [place], [place] [phone]
Deejay Montalvo MD,FACC.  [address] [place], [place] [phone]
Deejay Montalvo MD,FACC.  [address] [place], [place] [phone]
Deejay Montalvo MD,FACC.  [address] [place], [place] [phone]
[facility] NEPHROLOGY  Surinder Stevens M.D.  Migel Parikh D.O.  Yusra Lambert M.D.  Yanira Brooks, MSN, ANP-C  [phone]    [address] [place] [address]
[facility] NEPHROLOGY  Surinder Stevens M.D.  Migel Parikh D.O.  Yusra Lambert M.D.  Yanira Brooks, MSN, ANP-C  [phone]    [address] [place] [address]
[facility] NEPHROLOGY  Surinder Stevens M.D.  Migel Parikh D.O.  Yusra Lambert M.D.  Yanria Brooks, MSN, ANP-C  [phone]    [address] [place] [address]
[facility] NEPHROLOGY  Surinder Stevens M.D.  Migel Parikh D.O.  Yusra Lambert M.D.  Yanira Brooks, MSN, ANP-C  [phone]    [address] [place] [address]

## 2017-07-28 NOTE — PROGRESS NOTE ADULT - PROBLEM SELECTOR PLAN 2
Patient with h/o CKD-3 (baseline appears to be 1.8-2.0). Urinalysis with microscopic hematuria likely secondary to known nephrolithiasis for which recommend outpatient urology evaluation. Patient with minimal proteinuria with negative serological work up. Monitor electrolytes.
Patient with h/o CKD-3 (baseline appears to be 1.8-2.0). Urinalysis with microscopic hematuria likely secondary to known nephrolithiasis for which recommend outpatient urology evaluation. Patient with minimal proteinuria with negative serological work up. Monitor electrolytes.
Patient with h/o CKD-3 (baseline appears to be 1.8-2.0). Urinalysis with microscopic hematuria likely secondary to known nephrolithiasis for which recommend outpatient urology evaluation. Patient with minimal proteinuria. F/U pending serological work up including hepatitis C and paraproteinemia serologies. Monitor electrolytes.
Patient with h/o CKD-3 (baseline appears to be 1.8-2.0). Urinalysis with microscopic hematuria likely secondary to known nephrolithiasis for which recommend outpatient urology evaluation. Patient with minimal proteinuria. F/U pending serological work up including hepatitis C, SPEP and serum TOMMY. Monitor electrolytes.
Patient with h/o CKD-3 (baseline appears to be 1.8-2.0). Urinalysis with microscopic hematuria likely secondary to known nephrolithiasis for which repeat renal US pending. Patient with minimal proteinuria. Will check serological work up including hepatitis B, C and paraproteinemia serologies. Monitor electrolytes.

## 2017-07-28 NOTE — PROGRESS NOTE ADULT - PROVIDER SPECIALTY LIST ADULT
Cardiology
Nephrology

## 2017-07-28 NOTE — PROGRESS NOTE ADULT - ASSESSMENT
84 year old male with h/o CKD-3, BPH presents with chest pain. Nephrology consulted for FRANK on CKD-3.

## 2017-07-28 NOTE — PROGRESS NOTE ADULT - PROBLEM SELECTOR PLAN 5
With h/o urinary retention? for which recommend outpatient urology evaluation.    No renal objection to discharge with repeat renal panel within 10 days to monitor renal function.
With h/o urinary retention? for which recommend outpatient urology evaluation.    No renal objection to discharge.
With h/o urinary retention? for which recommend outpatient urology evaluation.    No renal objection to discharge.
With h/o urinary retention? for which recommend outpatient urology evaluation. Will check bladder scan as above.
With h/o urinary retention? for which repeat renal US pending. Will consider starting flomax/proscar pending US results.

## 2017-12-01 ENCOUNTER — OUTPATIENT (OUTPATIENT)
Dept: OUTPATIENT SERVICES | Facility: HOSPITAL | Age: 82
LOS: 1 days | End: 2017-12-01
Payer: MEDICARE

## 2017-12-01 PROCEDURE — G9001: CPT

## 2017-12-26 ENCOUNTER — INPATIENT (INPATIENT)
Facility: HOSPITAL | Age: 82
LOS: 2 days | Discharge: NURSING, REHAB OR LONG-TERM CARE | End: 2017-12-29
Attending: INTERNAL MEDICINE | Admitting: INTERNAL MEDICINE
Payer: MEDICARE

## 2017-12-26 VITALS
HEART RATE: 68 BPM | SYSTOLIC BLOOD PRESSURE: 146 MMHG | TEMPERATURE: 98 F | RESPIRATION RATE: 18 BRPM | DIASTOLIC BLOOD PRESSURE: 88 MMHG | OXYGEN SATURATION: 100 %

## 2017-12-26 DIAGNOSIS — Z79.899 OTHER LONG TERM (CURRENT) DRUG THERAPY: ICD-10-CM

## 2017-12-26 DIAGNOSIS — N18.9 CHRONIC KIDNEY DISEASE, UNSPECIFIED: ICD-10-CM

## 2017-12-26 DIAGNOSIS — H40.9 UNSPECIFIED GLAUCOMA: ICD-10-CM

## 2017-12-26 DIAGNOSIS — I10 ESSENTIAL (PRIMARY) HYPERTENSION: ICD-10-CM

## 2017-12-26 DIAGNOSIS — Z29.9 ENCOUNTER FOR PROPHYLACTIC MEASURES, UNSPECIFIED: ICD-10-CM

## 2017-12-26 DIAGNOSIS — K21.9 GASTRO-ESOPHAGEAL REFLUX DISEASE WITHOUT ESOPHAGITIS: ICD-10-CM

## 2017-12-26 DIAGNOSIS — R07.9 CHEST PAIN, UNSPECIFIED: ICD-10-CM

## 2017-12-26 DIAGNOSIS — R60.0 LOCALIZED EDEMA: ICD-10-CM

## 2017-12-26 LAB
ALBUMIN SERPL ELPH-MCNC: 3.8 G/DL — SIGNIFICANT CHANGE UP (ref 3.3–5)
ALP SERPL-CCNC: 66 U/L — SIGNIFICANT CHANGE UP (ref 40–120)
ALT FLD-CCNC: 13 U/L — SIGNIFICANT CHANGE UP (ref 4–41)
APPEARANCE UR: CLEAR — SIGNIFICANT CHANGE UP
AST SERPL-CCNC: 17 U/L — SIGNIFICANT CHANGE UP (ref 4–40)
BILIRUB SERPL-MCNC: 0.3 MG/DL — SIGNIFICANT CHANGE UP (ref 0.2–1.2)
BILIRUB UR-MCNC: NEGATIVE — SIGNIFICANT CHANGE UP
BLOOD UR QL VISUAL: HIGH
BUN SERPL-MCNC: 33 MG/DL — HIGH (ref 7–23)
BUN SERPL-MCNC: 36 MG/DL — HIGH (ref 7–23)
CALCIUM SERPL-MCNC: 8.4 MG/DL — SIGNIFICANT CHANGE UP (ref 8.4–10.5)
CALCIUM SERPL-MCNC: 8.4 MG/DL — SIGNIFICANT CHANGE UP (ref 8.4–10.5)
CHLORIDE SERPL-SCNC: 109 MMOL/L — HIGH (ref 98–107)
CHLORIDE SERPL-SCNC: 109 MMOL/L — HIGH (ref 98–107)
CHOLEST SERPL-MCNC: 170 MG/DL — SIGNIFICANT CHANGE UP (ref 120–199)
CK MB BLD-MCNC: 2.79 NG/ML — SIGNIFICANT CHANGE UP (ref 1–6.6)
CK MB BLD-MCNC: 2.95 NG/ML — SIGNIFICANT CHANGE UP (ref 1–6.6)
CK SERPL-CCNC: 147 U/L — SIGNIFICANT CHANGE UP (ref 30–200)
CK SERPL-CCNC: 160 U/L — SIGNIFICANT CHANGE UP (ref 30–200)
CO2 SERPL-SCNC: 21 MMOL/L — LOW (ref 22–31)
CO2 SERPL-SCNC: 23 MMOL/L — SIGNIFICANT CHANGE UP (ref 22–31)
COLOR SPEC: SIGNIFICANT CHANGE UP
CREAT SERPL-MCNC: 2 MG/DL — HIGH (ref 0.5–1.3)
CREAT SERPL-MCNC: 2.12 MG/DL — HIGH (ref 0.5–1.3)
GLUCOSE SERPL-MCNC: 83 MG/DL — SIGNIFICANT CHANGE UP (ref 70–99)
GLUCOSE SERPL-MCNC: 85 MG/DL — SIGNIFICANT CHANGE UP (ref 70–99)
GLUCOSE UR-MCNC: NEGATIVE — SIGNIFICANT CHANGE UP
HBA1C BLD-MCNC: 5.9 % — HIGH (ref 4–5.6)
HCT VFR BLD CALC: 33.1 % — LOW (ref 39–50)
HCT VFR BLD CALC: 34.3 % — LOW (ref 39–50)
HDLC SERPL-MCNC: 63 MG/DL — HIGH (ref 35–55)
HGB BLD-MCNC: 11.4 G/DL — LOW (ref 13–17)
HGB BLD-MCNC: 11.4 G/DL — LOW (ref 13–17)
KETONES UR-MCNC: NEGATIVE — SIGNIFICANT CHANGE UP
LEUKOCYTE ESTERASE UR-ACNC: NEGATIVE — SIGNIFICANT CHANGE UP
LIPID PNL WITH DIRECT LDL SERPL: 105 MG/DL — SIGNIFICANT CHANGE UP
MAGNESIUM SERPL-MCNC: 2.1 MG/DL — SIGNIFICANT CHANGE UP (ref 1.6–2.6)
MCHC RBC-ENTMCNC: 27.9 PG — SIGNIFICANT CHANGE UP (ref 27–34)
MCHC RBC-ENTMCNC: 28.9 PG — SIGNIFICANT CHANGE UP (ref 27–34)
MCHC RBC-ENTMCNC: 33.2 % — SIGNIFICANT CHANGE UP (ref 32–36)
MCHC RBC-ENTMCNC: 34.4 % — SIGNIFICANT CHANGE UP (ref 32–36)
MCV RBC AUTO: 83.9 FL — SIGNIFICANT CHANGE UP (ref 80–100)
MCV RBC AUTO: 84 FL — SIGNIFICANT CHANGE UP (ref 80–100)
MUCOUS THREADS # UR AUTO: SIGNIFICANT CHANGE UP
NITRITE UR-MCNC: NEGATIVE — SIGNIFICANT CHANGE UP
NRBC # FLD: 0 — SIGNIFICANT CHANGE UP
NRBC # FLD: 0 — SIGNIFICANT CHANGE UP
NT-PROBNP SERPL-SCNC: 387.1 PG/ML — SIGNIFICANT CHANGE UP
PH UR: 5.5 — SIGNIFICANT CHANGE UP (ref 4.6–8)
PHOSPHATE SERPL-MCNC: 2.8 MG/DL — SIGNIFICANT CHANGE UP (ref 2.5–4.5)
PLATELET # BLD AUTO: 132 K/UL — LOW (ref 150–400)
PLATELET # BLD AUTO: 147 K/UL — LOW (ref 150–400)
PMV BLD: 11 FL — SIGNIFICANT CHANGE UP (ref 7–13)
PMV BLD: 11.6 FL — SIGNIFICANT CHANGE UP (ref 7–13)
POTASSIUM SERPL-MCNC: 4.3 MMOL/L — SIGNIFICANT CHANGE UP (ref 3.5–5.3)
POTASSIUM SERPL-MCNC: 5.2 MMOL/L — SIGNIFICANT CHANGE UP (ref 3.5–5.3)
POTASSIUM SERPL-SCNC: 4.3 MMOL/L — SIGNIFICANT CHANGE UP (ref 3.5–5.3)
POTASSIUM SERPL-SCNC: 5.2 MMOL/L — SIGNIFICANT CHANGE UP (ref 3.5–5.3)
PROT SERPL-MCNC: 7.2 G/DL — SIGNIFICANT CHANGE UP (ref 6–8.3)
PROT UR-MCNC: 10 MG/DL — SIGNIFICANT CHANGE UP
RBC # BLD: 3.94 M/UL — LOW (ref 4.2–5.8)
RBC # BLD: 4.09 M/UL — LOW (ref 4.2–5.8)
RBC # FLD: 14.1 % — SIGNIFICANT CHANGE UP (ref 10.3–14.5)
RBC # FLD: 14.2 % — SIGNIFICANT CHANGE UP (ref 10.3–14.5)
RBC CASTS # UR COMP ASSIST: HIGH (ref 0–?)
SODIUM SERPL-SCNC: 142 MMOL/L — SIGNIFICANT CHANGE UP (ref 135–145)
SODIUM SERPL-SCNC: 143 MMOL/L — SIGNIFICANT CHANGE UP (ref 135–145)
SP GR SPEC: 1.01 — SIGNIFICANT CHANGE UP (ref 1–1.04)
TRIGL SERPL-MCNC: 54 MG/DL — SIGNIFICANT CHANGE UP (ref 10–149)
TROPONIN T SERPL-MCNC: < 0.06 NG/ML — SIGNIFICANT CHANGE UP (ref 0–0.06)
TROPONIN T SERPL-MCNC: < 0.06 NG/ML — SIGNIFICANT CHANGE UP (ref 0–0.06)
TSH SERPL-MCNC: 1.69 UIU/ML — SIGNIFICANT CHANGE UP (ref 0.27–4.2)
UROBILINOGEN FLD QL: NORMAL MG/DL — SIGNIFICANT CHANGE UP
WBC # BLD: 5.11 K/UL — SIGNIFICANT CHANGE UP (ref 3.8–10.5)
WBC # BLD: 5.2 K/UL — SIGNIFICANT CHANGE UP (ref 3.8–10.5)
WBC # FLD AUTO: 5.11 K/UL — SIGNIFICANT CHANGE UP (ref 3.8–10.5)
WBC # FLD AUTO: 5.2 K/UL — SIGNIFICANT CHANGE UP (ref 3.8–10.5)
WBC UR QL: SIGNIFICANT CHANGE UP (ref 0–?)

## 2017-12-26 PROCEDURE — 71010: CPT | Mod: 26

## 2017-12-26 PROCEDURE — 93970 EXTREMITY STUDY: CPT | Mod: 26

## 2017-12-26 RX ORDER — HEPARIN SODIUM 5000 [USP'U]/ML
5000 INJECTION INTRAVENOUS; SUBCUTANEOUS EVERY 12 HOURS
Qty: 0 | Refills: 0 | Status: DISCONTINUED | OUTPATIENT
Start: 2017-12-26 | End: 2017-12-29

## 2017-12-26 RX ORDER — CYCLOPENTOLATE HYDROCHLORIDE 10 MG/ML
1 SOLUTION/ DROPS OPHTHALMIC
Qty: 0 | Refills: 0 | COMMUNITY

## 2017-12-26 RX ORDER — OMEPRAZOLE 10 MG/1
1 CAPSULE, DELAYED RELEASE ORAL
Qty: 0 | Refills: 0 | COMMUNITY

## 2017-12-26 RX ORDER — METOPROLOL TARTRATE 50 MG
25 TABLET ORAL
Qty: 0 | Refills: 0 | Status: DISCONTINUED | OUTPATIENT
Start: 2017-12-26 | End: 2017-12-29

## 2017-12-26 RX ORDER — TIMOLOL 0.5 %
1 DROPS OPHTHALMIC (EYE) DAILY
Qty: 0 | Refills: 0 | Status: DISCONTINUED | OUTPATIENT
Start: 2017-12-26 | End: 2017-12-29

## 2017-12-26 RX ORDER — BRIMONIDINE TARTRATE, TIMOLOL MALEATE 2; 5 MG/ML; MG/ML
2 SOLUTION/ DROPS OPHTHALMIC
Qty: 0 | Refills: 0 | COMMUNITY

## 2017-12-26 RX ORDER — BIMATOPROST 0.3 MG/ML
1 SOLUTION/ DROPS OPHTHALMIC
Qty: 0 | Refills: 0 | COMMUNITY

## 2017-12-26 RX ORDER — PANTOPRAZOLE SODIUM 20 MG/1
40 TABLET, DELAYED RELEASE ORAL ONCE
Qty: 0 | Refills: 0 | Status: COMPLETED | OUTPATIENT
Start: 2017-12-26 | End: 2017-12-26

## 2017-12-26 RX ORDER — ATORVASTATIN CALCIUM 80 MG/1
20 TABLET, FILM COATED ORAL AT BEDTIME
Qty: 0 | Refills: 0 | Status: DISCONTINUED | OUTPATIENT
Start: 2017-12-26 | End: 2017-12-29

## 2017-12-26 RX ORDER — LATANOPROST 0.05 MG/ML
1 SOLUTION/ DROPS OPHTHALMIC; TOPICAL AT BEDTIME
Qty: 0 | Refills: 0 | Status: DISCONTINUED | OUTPATIENT
Start: 2017-12-26 | End: 2017-12-26

## 2017-12-26 RX ORDER — PANTOPRAZOLE SODIUM 20 MG/1
40 TABLET, DELAYED RELEASE ORAL
Qty: 0 | Refills: 0 | Status: DISCONTINUED | OUTPATIENT
Start: 2017-12-26 | End: 2017-12-29

## 2017-12-26 RX ORDER — FINASTERIDE 5 MG/1
5 TABLET, FILM COATED ORAL DAILY
Qty: 0 | Refills: 0 | Status: DISCONTINUED | OUTPATIENT
Start: 2017-12-26 | End: 2017-12-29

## 2017-12-26 RX ORDER — OFLOXACIN 0.3 %
1 DROPS OPHTHALMIC (EYE)
Qty: 0 | Refills: 0 | COMMUNITY

## 2017-12-26 RX ORDER — DUREZOL 0.5 MG/ML
1 EMULSION OPHTHALMIC
Qty: 0 | Refills: 0 | COMMUNITY

## 2017-12-26 RX ORDER — BRIMONIDINE TARTRATE 2 MG/MG
1 SOLUTION/ DROPS OPHTHALMIC DAILY
Qty: 0 | Refills: 0 | Status: DISCONTINUED | OUTPATIENT
Start: 2017-12-26 | End: 2017-12-29

## 2017-12-26 RX ORDER — LATANOPROST 0.05 MG/ML
1 SOLUTION/ DROPS OPHTHALMIC; TOPICAL AT BEDTIME
Qty: 0 | Refills: 0 | Status: DISCONTINUED | OUTPATIENT
Start: 2017-12-26 | End: 2017-12-29

## 2017-12-26 RX ORDER — TAMSULOSIN HYDROCHLORIDE 0.4 MG/1
0.4 CAPSULE ORAL AT BEDTIME
Qty: 0 | Refills: 0 | Status: DISCONTINUED | OUTPATIENT
Start: 2017-12-26 | End: 2017-12-29

## 2017-12-26 RX ORDER — ASPIRIN/CALCIUM CARB/MAGNESIUM 324 MG
81 TABLET ORAL DAILY
Qty: 0 | Refills: 0 | Status: DISCONTINUED | OUTPATIENT
Start: 2017-12-26 | End: 2017-12-29

## 2017-12-26 RX ADMIN — TAMSULOSIN HYDROCHLORIDE 0.4 MILLIGRAM(S): 0.4 CAPSULE ORAL at 21:31

## 2017-12-26 RX ADMIN — PANTOPRAZOLE SODIUM 40 MILLIGRAM(S): 20 TABLET, DELAYED RELEASE ORAL at 06:09

## 2017-12-26 RX ADMIN — Medication 25 MILLIGRAM(S): at 06:09

## 2017-12-26 RX ADMIN — LATANOPROST 1 DROP(S): 0.05 SOLUTION/ DROPS OPHTHALMIC; TOPICAL at 21:31

## 2017-12-26 RX ADMIN — PANTOPRAZOLE SODIUM 40 MILLIGRAM(S): 20 TABLET, DELAYED RELEASE ORAL at 01:38

## 2017-12-26 RX ADMIN — Medication 81 MILLIGRAM(S): at 09:39

## 2017-12-26 RX ADMIN — HEPARIN SODIUM 5000 UNIT(S): 5000 INJECTION INTRAVENOUS; SUBCUTANEOUS at 17:18

## 2017-12-26 RX ADMIN — ATORVASTATIN CALCIUM 20 MILLIGRAM(S): 80 TABLET, FILM COATED ORAL at 21:31

## 2017-12-26 RX ADMIN — BRIMONIDINE TARTRATE 1 DROP(S): 2 SOLUTION/ DROPS OPHTHALMIC at 09:39

## 2017-12-26 RX ADMIN — FINASTERIDE 5 MILLIGRAM(S): 5 TABLET, FILM COATED ORAL at 09:39

## 2017-12-26 RX ADMIN — Medication 25 MILLIGRAM(S): at 17:18

## 2017-12-26 RX ADMIN — HEPARIN SODIUM 5000 UNIT(S): 5000 INJECTION INTRAVENOUS; SUBCUTANEOUS at 06:09

## 2017-12-26 RX ADMIN — Medication 1 DROP(S): at 12:09

## 2017-12-26 NOTE — PHYSICAL THERAPY INITIAL EVALUATION ADULT - ADDITIONAL COMMENTS
Patient is a poor historian, information was obtained through chart review. Patient lives with his son and his son's family in an apartment. Patient takes only a few steps with walker; otherwise mostly in wheelchair. Patient had a home health aide prior to being at rehab for last ~1month.

## 2017-12-26 NOTE — ED PROVIDER NOTE - OBJECTIVE STATEMENT
85 year old male with history of CVA (with residual left sided deficits), BPH, GERD, CKD, HTN presents to the Emergency Department for left-sided chest pain.  Pain is now resolved.  It was described as a sharp, non-radiating pain that was -/10 in severity.    His blood glucose was 162 in the field and received ASA. 85 year old male with history of CVA (with residual left sided deficits), BPH, GERD, CKD, HTN presents to the Emergency Department for left-sided chest pain.  Pain is now resolved.  It was described as a non-radiating pain that is described as pressure sensation with some burning.  Occurred shortly after he had eaten dinner.  No associated fevers, chills, nausea, vomiting, shortness of breath, cough, recent infections/sick contacts, or worsening LE swelling.  His blood glucose was 162 in the field and received ASA by EMS. 85 year old male with history of CVA (with residual left sided deficits), BPH, GERD, CKD, HTN presents to the Emergency Department for left-sided chest pain.  Pain is now resolved.  It was described as a non-radiating pain that is described as pressure sensation with some burning.  Occurred shortly after he had eaten dinner.  No associated fevers, chills, nausea, vomiting, shortness of breath, cough, recent infections/sick contacts, or worsening LE swelling.  His blood glucose was 162 in the field and received ASA by EMS.  Klepfish: 85M PMH CVA (L residual, bedbound), BPH, GERD, CKD, HTN, CAD p/w midsternal/L CP, began while sitting down, lasted ~30min now resolved and asymptomatic. Similar to prior but cant say how often. Denies associated SOB, lightheaded, NVD, diaphoresis, new weakness/numbness, URI symptoms. Has chronic unchanged b/l LE edema. Received asa.   Last stress July w/ infarct, no ischemia. Last echo feb. Cardiologist not at [facility], admitted several times to Dr. FABY Montalvo.

## 2017-12-26 NOTE — PHYSICAL THERAPY INITIAL EVALUATION ADULT - PERTINENT HX OF CURRENT PROBLEM, REHAB EVAL
Patient is an 85 year old male admitted to [facility] on 12/26 with chest pain. PMH includes: CVA with residual left sided weakness, BPH, GERD, CKD, HTN.

## 2017-12-26 NOTE — H&P ADULT - ATTENDING COMMENTS
Deejay Montalvo MD,FACC.  [address] [facility].  [facility].  [phone]

## 2017-12-26 NOTE — H&P ADULT - NSHPREVIEWOFSYSTEMS_GEN_ALL_CORE
Constitutional: No fever, fatigue or weight loss.  Skin: No rash.  Eyes: No recent vision problems or eye pain.  ENT: No congestion, ear pain, or sore throat.  Endocrine: No thyroid problems.  Cardiovascular: + chest pain. No palpitation.  Respiratory: No cough, shortness of breath, congestion, or wheezing.  Gastrointestinal: No abdominal pain, nausea, vomiting, or diarrhea.  Genitourinary: No dysuria.  Musculoskeletal: No joint swelling.  Neurologic: No headache. Constitutional: No fever, fatigue or weight loss.  Skin: No rash.  Eyes: No recent vision problems or eye pain.  ENT: No congestion, ear pain, or sore throat.  Endocrine: No thyroid problems.  Cardiovascular: + chest pain. No palpitation.No PND,Orthopnea  Respiratory: No cough, shortness of breath, congestion, or wheezing.  Gastrointestinal: No abdominal pain, nausea, vomiting, or diarrhea.  Genitourinary: No dysuria.  Musculoskeletal: No joint swelling.  Neurologic: No headache.Left paresis

## 2017-12-26 NOTE — PHYSICAL THERAPY INITIAL EVALUATION ADULT - IMPAIRMENTS FOUND, PT EVAL
gross motor/arousal, attention, and cognition/gait, locomotion, and balance/joint integrity and mobility/muscle strength/ROM/posture

## 2017-12-26 NOTE — PHYSICAL THERAPY INITIAL EVALUATION ADULT - PATIENT PROFILE REVIEW, REHAB EVAL
PT orders received-->ambulate with assistance. Spoke with RN Noe CORCORAN-->pt OK to participate in PT evaluation./yes

## 2017-12-26 NOTE — ED ADULT TRIAGE NOTE - CHIEF COMPLAINT QUOTE
Pt comes in from home for c/o sudden onset left side chest pain that began around 2300 tonight. Pt described pain as sharp, non-radiating and reports pain has now dissipated. Pt received 162 aspirin in the filed which resolved his pain. Pt recently returned home from rehab s/p stroke recently, left side deficit from stroke remains. EKG in progress.

## 2017-12-26 NOTE — PHYSICAL THERAPY INITIAL EVALUATION ADULT - BALANCE DISTURBANCE, IDENTIFIED IMPAIRMENT CONTRIBUTE, REHAB EVAL
impaired motor control/decreased ROM/abnormal muscle tone/decreased strength/impaired postural control

## 2017-12-26 NOTE — PROVIDER CONTACT NOTE (OTHER) - ASSESSMENT
Patient denies c/p or sob. patient a&ox2. Patient pad has blood with 3 formed clots. Patient denies pain of discomfort to site

## 2017-12-26 NOTE — ED PROVIDER NOTE - ATTENDING CONTRIBUTION TO CARE
85M PMH CVA (L residual, bedbound), BPH, GERD, CKD, HTN, CAD p/w midsternal/L CP, now resolved. Vitals wnl, exam as above. EKG unchanged.  ddx: Likely GERD vs. ACS. clinically not PE, tamponade, dissection, PTX, perf, myocarditis, mediastinitis.   CBC, CMP, CE. CXR. HEART score 4. Additionally, stress test july w/ infarct.   Likely admission for further cardiac w/u.

## 2017-12-26 NOTE — H&P ADULT - ASSESSMENT
84 y/o M with h/o CVA with residual left sided deficits, BPH, GERD, CKD, HTN presents to the ED for left sided chest pain. Admit to telemetry to r/o ACS.

## 2017-12-26 NOTE — CHART NOTE - NSCHARTNOTEFT_GEN_A_CORE
Called by MEENU Shrestha to evaluate for bloody urine and clots  Pt appears to have been incontinent of urine which was bloody on jeffry with 5 small clots  Pt says he sometimes has bloody urine and urinary retention and that last night he was catheterized.  There is no documentation of him having been catheterized though.   His creatinine is elevated and his UA from last night had blood.  He is currently on ASA and SQ Heparin    Plan: continue to monitor  Monitor for urinary retention with low threshold for catheterization given the bloody clots

## 2017-12-26 NOTE — ED PROVIDER NOTE - PROGRESS NOTE DETAILS
Klepfish: Labs grossly at pt's baseline. Admitting for further cardiac w/u. updated pt. d/w dr. chacon, text paged tele pa.

## 2017-12-26 NOTE — ED PROVIDER NOTE - MEDICAL DECISION MAKING DETAILS
85 year old male with history of CVA (with residual left sided deficits), BPH, GERD, CKD, HTN presents to the Emergency Department for left-sided chest pain - now resolved. 85 year old male with history of CVA (with residual left sided deficits), BPH, GERD, CKD, HTN presents to the Emergency Department for left-sided chest pain - now resolved.  Likely due to GERD.  Plan to give Omeprazole and reassess.  Basics and troponin ordered for eval.

## 2017-12-26 NOTE — H&P ADULT - NSHPPHYSICALEXAM_GEN_ALL_CORE
GENERAL APPEARANCE: Well developed, well nourished, alert and cooperative, and appears to be in no acute distress.  HEAD: normocephalic.  EYES: PERRL, EOMI. + exopthalmic left eye with edematous eyelid.   EARS: External auditory canals clear, hearing grossly intact.  NECK: Neck supple, non-tender without lymphadenopathy, masses or thyromegaly.  CARDIAC: Normal S1 and S2. No S3, S4 or murmurs. Rhythm is regular.   LUNGS: Clear to auscultation and percussion without rales, rhonchi, wheezing or diminished breath sounds.  ABDOMEN: Positive bowel sounds. Soft, nondistended, nontender. No guarding or rebound. No masses.  EXTREMITIES: No significant deformity or joint abnormality.+ contracted left arm. 3+ edema bilaterally. No calf tenderness.  Peripheral pulses intact.   NEUROLOGICAL: CN II-XII intact. Strength and sensation symmetric and intact throughout.   SKIN: Skin normal color, texture and turgor with no lesions or eruptions.  PSYCHIATRIC: The mental examination revealed the patient was oriented to person, place, and time. The patient was able to demonstrate good judgement and reason, without hallucinations, abnormal affect or abnormal behaviors during the examination. Patient is not suicidal. GENERAL APPEARANCE: Well developed, well nourished, alert and cooperative, and appears to be in no acute distress.  HEAD: normocephalic.  EYES:+ exopthalmic left eye with edematous eyelid.   EARS: External auditory canals clear, hearing grossly intact.  NECK: Neck supple, non-tender without lymphadenopathy, masses or thyromegaly.  CARDIAC: Normal S1 and S2. No S3, S4 or murmurs. Rhythm is regular.   LUNGS: Clear to auscultation and percussion without rales, rhonchi, wheezing or diminished breath sounds.  ABDOMEN: Positive bowel sounds. Soft, nondistended, nontender. No guarding or rebound. No masses.  EXTREMITIES: No significant deformity or joint abnormality.+ contracted left arm. 3+ edema bilaterally. No calf tenderness.  Peripheral pulses intact.   SKIN: Skin normal color, texture and turgor with no lesions or eruptions.  PSYCHIATRIC: A and O X 1-2

## 2017-12-26 NOTE — H&P ADULT - NSHPLABSRESULTS_GEN_ALL_CORE
LABS:                        11.4   5.11  )-----------( 147      ( 26 Dec 2017 01:31 )             34.3         142  |  109<H>  |  36<H>  ----------------------------<  85  5.2   |  21<L>  |  2.12<H>    Ca    8.4      26 Dec 2017 01:31    TPro  7.2  /  Alb  3.8  /  TBili  0.3  /  DBili  x   /  AST  17  /  ALT  13  /  AlkPhos  66        Urinalysis Basic - ( 26 Dec 2017 02:44 )    Color: PLYEL / Appearance: CLEAR / S.013 / pH: 5.5  Gluc: NEGATIVE / Ketone: NEGATIVE  / Bili: NEGATIVE / Urobili: NORMAL mg/dL   Blood: SMALL / Protein: 10 mg/dL / Nitrite: NEGATIVE   Leuk Esterase: NEGATIVE / RBC: 5-10 / WBC 0-2   Sq Epi: x / Non Sq Epi: x / Bacteria: x      CAPILLARY BLOOD GLUCOSE            Urinalysis Basic - ( 26 Dec 2017 02:44 )    Color: PLYEL / Appearance: CLEAR / S.013 / pH: 5.5  Gluc: NEGATIVE / Ketone: NEGATIVE  / Bili: NEGATIVE / Urobili: NORMAL mg/dL   Blood: SMALL / Protein: 10 mg/dL / Nitrite: NEGATIVE   Leuk Esterase: NEGATIVE / RBC: 5-10 / WBC 0-2   Sq Epi: x / Non Sq Epi: x / Bacteria: x      EKG shows NSR @ 68 bpm TWI in V1

## 2017-12-26 NOTE — H&P ADULT - PROBLEM SELECTOR PLAN 1
Admit to telemetry.   R/O ACS.   EKG PRN chest pain.   Trend CE.  check cbc,tsh,lipid, hemoglobin a1c, bmp with mag and phos.   f/u MD note. Admit to telemetry.   R/O ACS.   EKG PRN chest pain.   Trend CE.  check cbc,tsh,lipid, hemoglobin a1c, bmp with mag and phos.   Prior TTE-normal LV systolic function,no WMA-will repeat.  NST-07/17-EF 68%-FIXED inferior defect-no ischemia noted,

## 2017-12-26 NOTE — ED PROVIDER NOTE - CONSTITUTIONAL, MLM
normal... Appears malnourished, awake, alert, oriented to person, place, time/situation and in no apparent distress. Appears malnourished, no apparent distress.

## 2017-12-26 NOTE — ED ADULT NURSE NOTE - OBJECTIVE STATEMENT
Pt 85y male, aaox3 and amb with walker and wheelchair, pt presents to ED c/o cp starting around 2300 tonight. As per son at bedside pt had a big late meal. Pain was sharp and non-radiating, pt currently denies any pain states cp resolved, pt received 162mg of asa in the field. Pt skin is clean dry and intact. Bilat LE swelling chronic and not a new onset. Pt pmh CVA with left sided weakness, facial drooping. Pt denies any other symptoms. Pt placed on cardiac monitor, will continue to monitor.

## 2017-12-26 NOTE — H&P ADULT - HISTORY OF PRESENT ILLNESS
84 y/o M with h/o CVA with residual left sided weakness, BPH, GERD, CKD, HTN presents to the ED for chest pain. Pt is a poor historian.  Pt states he had left sided chest pain after eating dinner. Pt states the chest pain was non radiating and lasted 30  minutes. Pt described the sensation as pressure like with some burning. Pt is currently chest pain free.  Pt at baseline at A and O X 1-2, bedbound and left sided weakness. Pt denies palpitations, shortness of breath, abdominal pain, dysuria, urinary/bowel incontinence or any other complaints at this time.

## 2017-12-27 DIAGNOSIS — E83.39 OTHER DISORDERS OF PHOSPHORUS METABOLISM: ICD-10-CM

## 2017-12-27 DIAGNOSIS — N18.3 CHRONIC KIDNEY DISEASE, STAGE 3 (MODERATE): ICD-10-CM

## 2017-12-27 DIAGNOSIS — R31.9 HEMATURIA, UNSPECIFIED: ICD-10-CM

## 2017-12-27 DIAGNOSIS — R80.9 PROTEINURIA, UNSPECIFIED: ICD-10-CM

## 2017-12-27 LAB
BUN SERPL-MCNC: 24 MG/DL — HIGH (ref 7–23)
BUN SERPL-MCNC: 27 MG/DL — HIGH (ref 7–23)
CALCIUM SERPL-MCNC: 6.5 MG/DL — CRITICAL LOW (ref 8.4–10.5)
CALCIUM SERPL-MCNC: 8.7 MG/DL — SIGNIFICANT CHANGE UP (ref 8.4–10.5)
CHLORIDE SERPL-SCNC: 108 MMOL/L — HIGH (ref 98–107)
CHLORIDE SERPL-SCNC: 119 MMOL/L — HIGH (ref 98–107)
CO2 SERPL-SCNC: 17 MMOL/L — LOW (ref 22–31)
CO2 SERPL-SCNC: 22 MMOL/L — SIGNIFICANT CHANGE UP (ref 22–31)
CREAT SERPL-MCNC: 1.4 MG/DL — HIGH (ref 0.5–1.3)
CREAT SERPL-MCNC: 1.81 MG/DL — HIGH (ref 0.5–1.3)
GLUCOSE SERPL-MCNC: 61 MG/DL — LOW (ref 70–99)
GLUCOSE SERPL-MCNC: 77 MG/DL — SIGNIFICANT CHANGE UP (ref 70–99)
HCT VFR BLD CALC: 31.8 % — LOW (ref 39–50)
HGB BLD-MCNC: 10.8 G/DL — LOW (ref 13–17)
MAGNESIUM SERPL-MCNC: 1.6 MG/DL — SIGNIFICANT CHANGE UP (ref 1.6–2.6)
MAGNESIUM SERPL-MCNC: 2.1 MG/DL — SIGNIFICANT CHANGE UP (ref 1.6–2.6)
MCHC RBC-ENTMCNC: 28.7 PG — SIGNIFICANT CHANGE UP (ref 27–34)
MCHC RBC-ENTMCNC: 34 % — SIGNIFICANT CHANGE UP (ref 32–36)
MCV RBC AUTO: 84.6 FL — SIGNIFICANT CHANGE UP (ref 80–100)
NRBC # FLD: 0 — SIGNIFICANT CHANGE UP
PHOSPHATE SERPL-MCNC: 2.2 MG/DL — LOW (ref 2.5–4.5)
PLATELET # BLD AUTO: 124 K/UL — LOW (ref 150–400)
PMV BLD: 11.5 FL — SIGNIFICANT CHANGE UP (ref 7–13)
POTASSIUM SERPL-MCNC: 3.8 MMOL/L — SIGNIFICANT CHANGE UP (ref 3.5–5.3)
POTASSIUM SERPL-MCNC: 4.5 MMOL/L — SIGNIFICANT CHANGE UP (ref 3.5–5.3)
POTASSIUM SERPL-SCNC: 3.8 MMOL/L — SIGNIFICANT CHANGE UP (ref 3.5–5.3)
POTASSIUM SERPL-SCNC: 4.5 MMOL/L — SIGNIFICANT CHANGE UP (ref 3.5–5.3)
RBC # BLD: 3.76 M/UL — LOW (ref 4.2–5.8)
RBC # FLD: 14 % — SIGNIFICANT CHANGE UP (ref 10.3–14.5)
SODIUM SERPL-SCNC: 142 MMOL/L — SIGNIFICANT CHANGE UP (ref 135–145)
SODIUM SERPL-SCNC: 146 MMOL/L — HIGH (ref 135–145)
WBC # BLD: 3.81 K/UL — SIGNIFICANT CHANGE UP (ref 3.8–10.5)
WBC # FLD AUTO: 3.81 K/UL — SIGNIFICANT CHANGE UP (ref 3.8–10.5)

## 2017-12-27 RX ADMIN — PANTOPRAZOLE SODIUM 40 MILLIGRAM(S): 20 TABLET, DELAYED RELEASE ORAL at 06:13

## 2017-12-27 RX ADMIN — TAMSULOSIN HYDROCHLORIDE 0.4 MILLIGRAM(S): 0.4 CAPSULE ORAL at 22:00

## 2017-12-27 RX ADMIN — HEPARIN SODIUM 5000 UNIT(S): 5000 INJECTION INTRAVENOUS; SUBCUTANEOUS at 17:26

## 2017-12-27 RX ADMIN — Medication 81 MILLIGRAM(S): at 11:24

## 2017-12-27 RX ADMIN — FINASTERIDE 5 MILLIGRAM(S): 5 TABLET, FILM COATED ORAL at 11:24

## 2017-12-27 RX ADMIN — BRIMONIDINE TARTRATE 1 DROP(S): 2 SOLUTION/ DROPS OPHTHALMIC at 11:24

## 2017-12-27 RX ADMIN — ATORVASTATIN CALCIUM 20 MILLIGRAM(S): 80 TABLET, FILM COATED ORAL at 22:00

## 2017-12-27 RX ADMIN — LATANOPROST 1 DROP(S): 0.05 SOLUTION/ DROPS OPHTHALMIC; TOPICAL at 22:00

## 2017-12-27 RX ADMIN — Medication 25 MILLIGRAM(S): at 17:18

## 2017-12-27 RX ADMIN — HEPARIN SODIUM 5000 UNIT(S): 5000 INJECTION INTRAVENOUS; SUBCUTANEOUS at 06:13

## 2017-12-27 RX ADMIN — Medication 1 DROP(S): at 11:24

## 2017-12-27 RX ADMIN — Medication 25 MILLIGRAM(S): at 06:13

## 2017-12-27 NOTE — PROGRESS NOTE ADULT - SUBJECTIVE AND OBJECTIVE BOX
PRESENTING CC:Chest pain    SUBJ: 84 y/o M with h/o CVA with residual left sided weakness, BPH, GERD, CKD, HTN presents to the ED for chest pain.Remains chest pain free,noted hematuria.      PMH -reviewed admission note, no change since admission  Heart failure: acute [ ] chronic [ ] acute or chronic [ ] diastolic [ ] systolic [ ] combined systolic and diastolic[ ]  FRANK: ATN[ ] renal medullary necrosis [ ] CKD I [ ]CKDII [x ]CKD III [ ]CKD IV [ ]CKD V [ ]Other pathological lesions [ ]    MEDICATIONS  (STANDING):  aspirin  chewable 81 milliGRAM(s) Oral daily  atorvastatin 20 milliGRAM(s) Oral at bedtime  brimonidine 0.2% Ophthalmic Solution 1 Drop(s) Left EYE daily  finasteride 5 milliGRAM(s) Oral daily  heparin  Injectable 5000 Unit(s) SubCutaneous every 12 hours  latanoprost 0.005% Ophthalmic Solution 1 Drop(s) Left EYE at bedtime  metoprolol     tartrate 25 milliGRAM(s) Oral two times a day  pantoprazole    Tablet 40 milliGRAM(s) Oral before breakfast  tamsulosin 0.4 milliGRAM(s) Oral at bedtime  timolol 0.5% Solution 1 Drop(s) Left EYE daily      FAMILY HISTORY:  No pertinent family history in first degree relatives  No family history of premature coronary artery disease or sudden cardiac death      REVIEW OF SYSTEMS:  Constitutional: [ ] fever, [ ]weight loss,  [x ]fatigue  Eyes: [ ] visual changes  Respiratory: [ ]shortness of breath;  [ ] cough, [ ]wheezing, [ ]chills, [ ]hemoptysis  Cardiovascular: [x ] chest pain, [ ]palpitations, [ ]dizziness,  [ ]leg swelling[ ]orthopnea[ ]PND  Gastrointestinal: [ ] abdominal pain, [ ]nausea, [ ]vomiting,  [ ]diarrhea   Genitourinary: [ ] dysuria, [ ] hematuria  Neurologic: [ ] headaches [ ] tremors[ ]weakness  Skin: [ ] itching, [ ]burning, [ ] rashes  Endocrine: [ ] heat or cold intolerance  Musculoskeletal: [ ] joint pain or swelling; [ ] muscle, back, or extremity pain  Psychiatric: [ ] depression, [ ]anxiety, [ ]mood swings, or [ ]difficulty sleeping  Hematologic: [ ] easy bruising, [ ] bleeding gums    [x] All remaining systems negative except as per above.   [ ]Unable to obtain.    Vital Signs Last 24 Hrs  T(C): 36.7 (27 Dec 2017 06:09), Max: 36.9 (26 Dec 2017 21:27)  T(F): 98.1 (27 Dec 2017 06:09), Max: 98.5 (26 Dec 2017 21:27)  HR: 60 (27 Dec 2017 06:09) (57 - 68)  BP: 148/83 (27 Dec 2017 06:09) (126/88 - 156/88)  RR: 18 (27 Dec 2017 06:09) (18 - 18)  SpO2: 100% (27 Dec 2017 06:09) (100% - 100%)  I&O's Summary    26 Dec 2017 07:01  -  27 Dec 2017 07:00  --------------------------------------------------------  IN: 240 mL / OUT: 0 mL / NET: 240 mL        PHYSICAL EXAM:  General: No acute distress BMI-23.7  HEENT: EOMI, PERRL  Neck: Supple, [ ] JVD  Lungs: Equal air entry bilaterally; [ ] rales [ ] wheezing [ ] rhonchi  Heart: Regular rate and rhythm; [x ] murmur  2 /6 [ ] systolic [ ] diastolic [ ] radiation[ ] rubs [ ]  gallops  Abdomen: Nontender, bowel sounds present  Extremities: No clubbing, cyanosis, [x ] edema  Nervous system:  Alert & Oriented X3, no focal deficits  Psychiatric: Normal affect  Skin: No rashes or lesions    LABS:  12-26    143  |  109<H>  |  33<H>  ----------------------------<  83  4.3   |  23  |  2.00<H>    Ca    8.4      26 Dec 2017 06:25  Phos  2.8     12-26  Mg     2.1     12-26    TPro  7.2  /  Alb  3.8  /  TBili  0.3  /  DBili  x   /  AST  17  /  ALT  13  /  AlkPhos  66  12-26    Creatinine Trend: 2.00<--, 2.12<--                        10.8   3.81  )-----------( 124      ( 27 Dec 2017 06:15 )             31.8       Lipid Panel:   Cardiac Enzymes: CARDIAC MARKERS ( 26 Dec 2017 06:25 )  x     / < 0.06 ng/mL / 147 u/L / 2.95 ng/mL / x      CARDIAC MARKERS ( 26 Dec 2017 01:31 )  x     / < 0.06 ng/mL / 160 u/L / 2.79 ng/mL / x          Serum Pro-Brain Natriuretic Peptide: 387.1 pg/mL (12-26-17 @ 06:25)    IMPRESSION AND PLAN:    84 y/o M with h/o CVA with residual left sided deficits, BPH, GERD, CKD, HTN presents to the ED for left sided chest pain. Admit to telemetry to r/o ACS.     Problem/Plan - 1:  ·  Problem: Chest pain.  Plan: Admit to telemetry.   R/O ACS.-No evidence for recent cardiac injury-   Prior TTE-normal LV systolic function,no WMA-will repeat.  NST-07/17-EF 68%-FIXED inferior defect-no ischemia     Problem/Plan - 2:  ·  Problem: Lower extremity edema.  Plan: Venous dopplers-. No evidence of bilateral lower extremity deep venous thrombosis.Left  peroneal vein not identified.                                                 Problem/Plan - 3:  ·  Problem: Glaucoma.  Plan: Continue with alphagan, xalatan and timoptic.     Problem/Plan - 4:  ·  Problem: CKD (chronic kidney disease).  Plan: Creatinine at baseline.   Monitor kidney function and avoid nephrotoxic drugs.  Noted mild hematuria-no drop in Hgb,monitor.     Problem/Plan - 5:  ·  Problem: Hypertension.  Plan: Routine blood pressure check.  Continue with current medications.   Low salt,low cholesterol, DASH diet.     Problem/Plan - 6:  Problem: GERD (Gastroesophageal Reflux Disease). Plan: Continue with pantoprazole.    Problem/Plan - 7:  ·  Problem: Need for prophylactic measure.  Plan: Heparin SQ for vte prophylaxis.

## 2017-12-27 NOTE — CONSULT NOTE ADULT - SUBJECTIVE AND OBJECTIVE BOX
Dr. Oreilly (Nephrology)  Office [phone]  Cell [phone]  Lillian ASENCIO  Cell [phone]    HPI:  86 y/o M with h/o CVA with residual left sided weakness, BPH, GERD, CKD, HTN presents to the ED for chest pain. Pt is a poor historian.  Pt states he had left sided chest pain after eating dinner. Pt states the chest pain was non radiating and lasted 30  minutes. Pt described the sensation as pressure like with some burning. Pt is currently chest pain free.  Pt at baseline at A and O X 1-2, bedbound and left sided weakness. Pt denies palpitations, shortness of breath, abdominal pain, dysuria, urinary/bowel incontinence or any other complaints at this time.       Allergies:  No Known Allergies      PAST MEDICAL & SURGICAL HISTORY:  CKD (chronic kidney disease)  Hypertension  CVA (cerebral infarction)  GERD (Gastroesophageal Reflux Disease)  Anxiety  Intractable Hiccups  Back Pain  History of Laminectomy  Status Post Eye Surgery  S/P Bilateral Inguinal Hernia Repair      Home Medications Reviewed    Hospital Medications:   MEDICATIONS  (STANDING):  aspirin  chewable 81 milliGRAM(s) Oral daily  atorvastatin 20 milliGRAM(s) Oral at bedtime  brimonidine 0.2% Ophthalmic Solution 1 Drop(s) Left EYE daily  finasteride 5 milliGRAM(s) Oral daily  heparin  Injectable 5000 Unit(s) SubCutaneous every 12 hours  latanoprost 0.005% Ophthalmic Solution 1 Drop(s) Left EYE at bedtime  metoprolol     tartrate 25 milliGRAM(s) Oral two times a day  pantoprazole    Tablet 40 milliGRAM(s) Oral before breakfast  tamsulosin 0.4 milliGRAM(s) Oral at bedtime  timolol 0.5% Solution 1 Drop(s) Left EYE daily      SOCIAL HISTORY:  Denies ETOh, Smoking,     FAMILY HISTORY:  No pertinent family history in first degree relatives      REVIEW OF SYSTEMS:  CONSTITUTIONAL: No weakness, fevers or chills  EYES/ENT: No visual changes;  No vertigo or throat pain   NECK: No pain or stiffness  RESPIRATORY: No cough, wheezing, hemoptysis; No shortness of breath  CARDIOVASCULAR: see HPI  GASTROINTESTINAL: No abdominal or epigastric pain. No nausea, vomiting, or hematemesis; No diarrhea or constipation. No melena or hematochezia.  GENITOURINARY: No dysuria, frequency, foamy urine, urinary urgency, incontinence or hematuria  NEUROLOGICAL: left side weakness  SKIN: No itching, burning, rashes, or lesions   VASCULAR: No bilateral lower extremity edema.   All other review of systems is negative unless indicated above.    VITALS:  T(F): 98.1 (17 @ 06:09), Max: 98.5 (17 @ 21:27)  HR: 60 (17 @ 06:09)  BP: 148/83 (17 @ 06:09)  RR: 18 (17 @ 06:09)  SpO2: 100% (17 @ 06:09)  Wt(kg): --     @ 07:01  -   @ 07:00  --------------------------------------------------------  IN: 240 mL / OUT: 0 mL / NET: 240 mL     @ 07:01  -   @ 14:32  --------------------------------------------------------  IN: 360 mL / OUT: 0 mL / NET: 360 mL          PHYSICAL EXAM:  Constitutional: NAD  HEENT: anicteric sclera, oropharynx clear, MMM  Neck: No JVD  Respiratory: CTAB, no wheezes, rales or rhonchi  Cardiovascular: S1, S2, RRR  Gastrointestinal: BS+, soft, NT/ND  Extremities: No cyanosis or clubbing. No peripheral edema  Neurological: A/O x 2, left side weakness  Psychiatric: Normal mood, normal affect  : No CVA tenderness. No odonnell.   Skin: No rashes      LABS:      142  |  108<H>  |  27<H>  ----------------------------<  77  4.5   |  22  |  1.81<H>    Ca    8.7      27 Dec 2017 08:20  Phos  2.2       Mg     2.1         TPro  7.2  /  Alb  3.8  /  TBili  0.3  /  DBili      /  AST  17  /  ALT  13  /  AlkPhos  66      Creatinine Trend: 1.81 <--, 1.40 <--, 2.00 <--, 2.12 <--                        10.8   3.81  )-----------( 124      ( 27 Dec 2017 06:15 )             31.8     Urine Studies:  Urinalysis Basic - ( 26 Dec 2017 02:44 )    Color: PLYEL / Appearance: CLEAR / S.013 / pH: 5.5  Gluc: NEGATIVE / Ketone: NEGATIVE  / Bili: NEGATIVE / Urobili: NORMAL mg/dL   Blood: SMALL / Protein: 10 mg/dL / Nitrite: NEGATIVE   Leuk Esterase: NEGATIVE / RBC: 5-10 / WBC 0-2   Sq Epi:  / Non Sq Epi:  / Bacteria:           RADIOLOGY & ADDITIONAL STUDIES: Dr. Oreilly (Nephrology)  Office [phone]  Cell [phone]  Lillian ASENCIO  Cell [phone]    HPI:  86 y/o M with h/o CVA with residual left sided weakness, BPH, GERD, CKD, HTN presents to the ED for chest pain. Pt is a poor historian.  Pt states he had left sided chest pain after eating dinner. Pt states the chest pain was non radiating and lasted 30  minutes. Pt described the sensation as pressure like with some burning. Pt is currently chest pain free.  Pt at baseline at A and O X 1-2, bedbound and left sided weakness. Pt denies palpitations, shortness of breath, abdominal pain, dysuria, urinary/bowel incontinence or any other complaints at this time.       Allergies:  No Known Allergies      PAST MEDICAL & SURGICAL HISTORY:  CKD (chronic kidney disease)  Hypertension  CVA (cerebral infarction)  GERD (Gastroesophageal Reflux Disease)  Anxiety  Intractable Hiccups  Back Pain  History of Laminectomy  Status Post Eye Surgery  S/P Bilateral Inguinal Hernia Repair      Home Medications Reviewed    Hospital Medications:   MEDICATIONS  (STANDING):  aspirin  chewable 81 milliGRAM(s) Oral daily  atorvastatin 20 milliGRAM(s) Oral at bedtime  brimonidine 0.2% Ophthalmic Solution 1 Drop(s) Left EYE daily  finasteride 5 milliGRAM(s) Oral daily  heparin  Injectable 5000 Unit(s) SubCutaneous every 12 hours  latanoprost 0.005% Ophthalmic Solution 1 Drop(s) Left EYE at bedtime  metoprolol     tartrate 25 milliGRAM(s) Oral two times a day  pantoprazole    Tablet 40 milliGRAM(s) Oral before breakfast  tamsulosin 0.4 milliGRAM(s) Oral at bedtime  timolol 0.5% Solution 1 Drop(s) Left EYE daily      SOCIAL HISTORY:  Denies ETOh, Smoking,     FAMILY HISTORY:  No pertinent family history in first degree relatives      REVIEW OF SYSTEMS:  CONSTITUTIONAL: No weakness, fevers or chills  EYES/ENT: No visual changes;  No vertigo or throat pain   NECK: No pain or stiffness  RESPIRATORY: No cough, wheezing, hemoptysis; No shortness of breath  CARDIOVASCULAR: see HPI  GASTROINTESTINAL: No abdominal or epigastric pain. No nausea, vomiting, or hematemesis; No diarrhea or constipation. No melena or hematochezia.  GENITOURINARY: No dysuria, frequency, foamy urine, urinary urgency, incontinence or hematuria  NEUROLOGICAL: left side weakness  SKIN: No itching, burning, rashes, or lesions   VASCULAR: No bilateral lower extremity edema.   All other review of systems is negative unless indicated above.    VITALS:  T(F): 98.1 (17 @ 06:09), Max: 98.5 (17 @ 21:27)  HR: 60 (17 @ 06:09)  BP: 148/83 (17 @ 06:09)  RR: 18 (17 @ 06:09)  SpO2: 100% (17 @ 06:09)  Wt(kg): --     @ 07:01  -   @ 07:00  --------------------------------------------------------  IN: 240 mL / OUT: 0 mL / NET: 240 mL     @ 07:01  -   @ 14:32  --------------------------------------------------------  IN: 360 mL / OUT: 0 mL / NET: 360 mL          PHYSICAL EXAM:  Constitutional: NAD  HEENT: anicteric sclera, oropharynx clear, MMM  Neck: No JVD  Respiratory: CTAB, no wheezes, rales or rhonchi  Cardiovascular: S1, S2, RRR  Gastrointestinal: BS+, soft, NT/ND  Extremities: 2+ peripheral edema  Neurological: A/O x 2, left side weakness  Psychiatric: Normal mood, normal affect  : No CVA tenderness. No odonnell.   Skin: No rashes      LABS:      142  |  108<H>  |  27<H>  ----------------------------<  77  4.5   |  22  |  1.81<H>    Ca    8.7      27 Dec 2017 08:20  Phos  2.2       Mg     2.1         TPro  7.2  /  Alb  3.8  /  TBili  0.3  /  DBili      /  AST  17  /  ALT  13  /  AlkPhos  66      Creatinine Trend: 1.81 <--, 1.40 <--, 2.00 <--, 2.12 <--                        10.8   3.81  )-----------( 124      ( 27 Dec 2017 06:15 )             31.8     Urine Studies:  Urinalysis Basic - ( 26 Dec 2017 02:44 )    Color: PLYEL / Appearance: CLEAR / S.013 / pH: 5.5  Gluc: NEGATIVE / Ketone: NEGATIVE  / Bili: NEGATIVE / Urobili: NORMAL mg/dL   Blood: SMALL / Protein: 10 mg/dL / Nitrite: NEGATIVE   Leuk Esterase: NEGATIVE / RBC: 5-10 / WBC 0-2   Sq Epi:  / Non Sq Epi:  / Bacteria:           RADIOLOGY & ADDITIONAL STUDIES:

## 2017-12-27 NOTE — CONSULT NOTE ADULT - ASSESSMENT
86 y/o M with h/o CVA with residual left sided weakness, BPH, GERD, CKD, HTN presents to the ED for chest pain

## 2017-12-27 NOTE — CONSULT NOTE ADULT - PROBLEM SELECTOR RECOMMENDATION 9
baseline ~1.8 - 2, pt does not remember if he see nephrologist   check pth, phos  monitor bmp baseline ~1.8 - 2, pt does not remember if he sees a nephrologist.  Renal function relatively stable  check pth, phos  monitor bmp

## 2017-12-28 ENCOUNTER — TRANSCRIPTION ENCOUNTER (OUTPATIENT)
Age: 82
End: 2017-12-28

## 2017-12-28 PROCEDURE — 93306 TTE W/DOPPLER COMPLETE: CPT | Mod: 26

## 2017-12-28 RX ORDER — ATORVASTATIN CALCIUM 80 MG/1
1 TABLET, FILM COATED ORAL
Qty: 0 | Refills: 0 | COMMUNITY
Start: 2017-12-28

## 2017-12-28 RX ORDER — FINASTERIDE 5 MG/1
1 TABLET, FILM COATED ORAL
Qty: 0 | Refills: 0 | COMMUNITY
Start: 2017-12-28

## 2017-12-28 RX ORDER — ASPIRIN/CALCIUM CARB/MAGNESIUM 324 MG
1 TABLET ORAL
Qty: 0 | Refills: 0 | COMMUNITY
Start: 2017-12-28

## 2017-12-28 RX ORDER — TAMSULOSIN HYDROCHLORIDE 0.4 MG/1
1 CAPSULE ORAL
Qty: 0 | Refills: 0 | COMMUNITY
Start: 2017-12-28

## 2017-12-28 RX ORDER — TAMSULOSIN HYDROCHLORIDE 0.4 MG/1
0.4 CAPSULE ORAL
Qty: 0 | Refills: 0 | COMMUNITY

## 2017-12-28 RX ORDER — FINASTERIDE 5 MG/1
1 TABLET, FILM COATED ORAL
Qty: 0 | Refills: 0 | COMMUNITY

## 2017-12-28 RX ORDER — METOPROLOL TARTRATE 50 MG
1 TABLET ORAL
Qty: 0 | Refills: 0 | COMMUNITY
Start: 2017-12-28

## 2017-12-28 RX ADMIN — ATORVASTATIN CALCIUM 20 MILLIGRAM(S): 80 TABLET, FILM COATED ORAL at 21:20

## 2017-12-28 RX ADMIN — Medication 25 MILLIGRAM(S): at 05:39

## 2017-12-28 RX ADMIN — Medication 25 MILLIGRAM(S): at 17:24

## 2017-12-28 RX ADMIN — Medication 81 MILLIGRAM(S): at 11:57

## 2017-12-28 RX ADMIN — Medication 1 DROP(S): at 11:57

## 2017-12-28 RX ADMIN — PANTOPRAZOLE SODIUM 40 MILLIGRAM(S): 20 TABLET, DELAYED RELEASE ORAL at 05:39

## 2017-12-28 RX ADMIN — FINASTERIDE 5 MILLIGRAM(S): 5 TABLET, FILM COATED ORAL at 11:57

## 2017-12-28 RX ADMIN — LATANOPROST 1 DROP(S): 0.05 SOLUTION/ DROPS OPHTHALMIC; TOPICAL at 21:20

## 2017-12-28 RX ADMIN — HEPARIN SODIUM 5000 UNIT(S): 5000 INJECTION INTRAVENOUS; SUBCUTANEOUS at 05:39

## 2017-12-28 RX ADMIN — HEPARIN SODIUM 5000 UNIT(S): 5000 INJECTION INTRAVENOUS; SUBCUTANEOUS at 17:25

## 2017-12-28 RX ADMIN — TAMSULOSIN HYDROCHLORIDE 0.4 MILLIGRAM(S): 0.4 CAPSULE ORAL at 21:20

## 2017-12-28 RX ADMIN — BRIMONIDINE TARTRATE 1 DROP(S): 2 SOLUTION/ DROPS OPHTHALMIC at 11:57

## 2017-12-28 NOTE — PROGRESS NOTE ADULT - ATTENDING COMMENTS
Thank you for the courtesy of the consultation,I would be available for any further discussion if needed.  Deejay Montalvo MD,FACC.  [address] [place], [place] [id number] [phone]
Deejay Montalvo MD,FACC.  [address] [facility].  [id number].  [phone]

## 2017-12-28 NOTE — DISCHARGE NOTE ADULT - HOSPITAL COURSE
84 y/o M with h/o CVA with residual left sided deficits, BPH, GERD, CKD, HTN presents to the ED for left sided chest pain. Admit to telemetry to r/o ACS. Pt was ruled out for MI, tele with no events.  Evaluated by cardiology-No evidence for recent cardiac injury- Prior TTE- normal LV systolic function,no WMA. NST-07/17-EF 68%-FIXED inferior defect-no ischemia.  No further cardiac work-up indicated. 86 y/o M with h/o CVA with residual left sided deficits, BPH, GERD, CKD, HTN presents to the ED for left sided chest pain. Admit to telemetry to r/o ACS. Pt was ruled out for MI, tele with no events.  Evaluated by cardiology-No evidence for recent cardiac injury- Prior TTE- normal LV systolic function, no WMA. NST-07/17-EF 68%-FIXED inferior defect-no ischemia.  No further cardiac work-up indicated.   Hematuria noted and outpatient followup with Urologist recommended.  His renal function is stable and continued monitoring by Nephrology is recommended.  He is stable for discharge to Rehab today 12/29/17.

## 2017-12-28 NOTE — PROGRESS NOTE ADULT - PROBLEM SELECTOR PLAN 1
~1.8 - 2, pt does not remember if he sees a nephrologist.  Renal function relatively stable  check pth, phos  monitor bmp.

## 2017-12-28 NOTE — DISCHARGE NOTE ADULT - PROVIDER TOKENS
TOKEN:'[id number]',FREE:[LAST:[Urology Clinic at [facility]],PHONE:[phone]],FAX:[(   )    -],ADDRESS:[Please call for appointment  Or [facility] at [phone]  Please call for appointment]]

## 2017-12-28 NOTE — DISCHARGE NOTE ADULT - CARE PLAN
Principal Discharge DX:	Chest pain, unspecified type  Goal:	stable  Instructions for follow-up, activity and diet:	No evidence for recent cardiac injury-   Prior TTE-normal LV systolic function,no WMA-will repeat.  NST-07/17-EF 68%-FIXED inferior defect-no ischemia.  No further cardiac work-up.  Secondary Diagnosis:	CKD (chronic kidney disease)  Instructions for follow-up, activity and diet:	Monitor your renal function carefully and prevent worsening renal function. Avoid NSAIDs and nephrotoxic agents (that may negatively affect your kidneys). Follow up with your nephrologist within 1-2 weeks of discharge. Continue to monitor your renal function. Avoid NSAIDs and nephrotoxic agents. Low K diet.  Secondary Diagnosis:	Hematuria  Goal:	improved  Instructions for follow-up, activity and diet:	Please follow up with Urology clinic for monitoring of blood in urine. Please call for appointment. Principal Discharge DX:	Chest pain, unspecified type  Goal:	stable  Instructions for follow-up, activity and diet:	No evidence for recent cardiac injury-   Prior TTE-normal LV systolic function, no WMA-will repeat.  NST-07/17-EF 68%-FIXED inferior defect-no ischemia.  No further cardiac work-up.  Secondary Diagnosis:	CKD (chronic kidney disease)  Instructions for follow-up, activity and diet:	Monitor your renal function carefully and prevent worsening renal function. Avoid NSAIDs and nephrotoxic agents (that may negatively affect your kidneys). Follow up with your nephrologist within 1-2 weeks of discharge. Continue to monitor your renal function. Avoid NSAIDs and nephrotoxic agents. Low K diet.  Secondary Diagnosis:	Hematuria  Goal:	improved  Instructions for follow-up, activity and diet:	Please follow up with Urology clinic for monitoring of blood in urine. Please call for appointment.

## 2017-12-28 NOTE — PROGRESS NOTE ADULT - SUBJECTIVE AND OBJECTIVE BOX
PRESENTING CC: Chest Pain    SUBJ:  84 y/o M with h/o CVA with residual left sided weakness, BPH, GERD, CKD, HTN presents to the ED for chest pain.Remains chest pain free,noted hematuria.Renal consult noted,no overnight events.      PMH -reviewed admission note, no change since admission  Heart failure: acute [ ] chronic [ ] acute or chronic [ ] diastolic [ ] systolic [ ] combined systolic and diastolic[ ]  FRANK: ATN[ ] renal medullary necrosis [ ] CKD I [ ]CKDII [x ]CKD III [ ]CKD IV [ ]CKD V [ ]Other pathological lesions [ ]    MEDICATIONS  (STANDING):  aspirin  chewable 81 milliGRAM(s) Oral daily  atorvastatin 20 milliGRAM(s) Oral at bedtime  brimonidine 0.2% Ophthalmic Solution 1 Drop(s) Left EYE daily  finasteride 5 milliGRAM(s) Oral daily  heparin  Injectable 5000 Unit(s) SubCutaneous every 12 hours  latanoprost 0.005% Ophthalmic Solution 1 Drop(s) Left EYE at bedtime  metoprolol     tartrate 25 milliGRAM(s) Oral two times a day  pantoprazole    Tablet 40 milliGRAM(s) Oral before breakfast  tamsulosin 0.4 milliGRAM(s) Oral at bedtime  timolol 0.5% Solution 1 Drop(s) Left EYE daily    FAMILY HISTORY:  No pertinent family history in first degree relatives  No family history of premature coronary artery disease or sudden cardiac death      REVIEW OF SYSTEMS:  Constitutional: [ ] fever, [ ]weight loss,  [x ]fatigue  Eyes: [ ] visual changes  Respiratory: [ ]shortness of breath;  [ ] cough, [ ]wheezing, [ ]chills, [ ]hemoptysis  Cardiovascular: [x ] chest pain, [ ]palpitations, [ ]dizziness,  [ ]leg swelling[ ]orthopnea[ ]PND  Gastrointestinal: [ ] abdominal pain, [ ]nausea, [ ]vomiting,  [ ]diarrhea   Genitourinary: [ ] dysuria, [x ] hematuria  Neurologic: [ ] headaches [ ] tremors[x ]weakness-left   Skin: [ ] itching, [ ]burning, [ ] rashes  Endocrine: [ ] heat or cold intolerance  Musculoskeletal: [ ] joint pain or swelling; [ ] muscle, back, or extremity pain  Psychiatric: [ ] depression, [ ]anxiety, [ ]mood swings, or [ ]difficulty sleeping  Hematologic: [ ] easy bruising, [ ] bleeding gums    [x] All remaining systems negative except as per above.   [ ]Unable to obtain.    Vital Signs Last 24 Hrs  T(C): 36.4 (28 Dec 2017 05:37), Max: 36.8 (27 Dec 2017 14:48)  T(F): 97.6 (28 Dec 2017 05:37), Max: 98.3 (27 Dec 2017 14:48)  HR: 58 (28 Dec 2017 05:37) (54 - 67)  BP: 153/83 (28 Dec 2017 05:37) (107/70 - 153/83)  RR: 18 (28 Dec 2017 05:37) (18 - 18)  SpO2: 100% (28 Dec 2017 05:37) (98% - 100%)  I&O's Summary    27 Dec 2017 07:01  -  28 Dec 2017 07:00  --------------------------------------------------------  IN: 480 mL / OUT: 0 mL / NET: 480 mL        PHYSICAL EXAM:  General: No acute distress BMI-23.7  HEENT: EOMI, PERRL  Neck: Supple, [ ] JVD  Lungs: Equal air entry bilaterally; [ ] rales [ ] wheezing [ ] rhonchi  Heart: Regular rate and rhythm; [ ] murmur  2 /6 [x ] systolic [ ] diastolic [ ] radiation[ ] rubs [ ]  gallops  Abdomen: Nontender, bowel sounds present  Extremities: No clubbing, cyanosis, [x ] edema  Nervous system:  Alert & Oriented X2,Left paresis with contracture.  Psychiatric: Normal affect  Skin: No rashes or lesions    LABS:  12-27    142  |  108<H>  |  27<H>  ----------------------------<  77  4.5   |  22  |  1.81<H>    Ca    8.7      27 Dec 2017 08:20  Phos  2.2     12-27  Mg     2.1     12-27      Creatinine Trend: 1.81<--, 1.40<--, 2.00<--, 2.12<--                        10.8   3.81  )-----------( 124      ( 27 Dec 2017 06:15 )             31.8     TELEMETRY:Sinus Rhythm no arrhythmias.    ECHO: Normal left ventricular systolic function.  No segmental wall motion abnormalities  Ejection Fraction (Teicholtz): 75 %  Mild-moderate aortic regurgitation.      IMPRESSION AND PLAN:    84 y/o M with h/o CVA with residual left sided deficits, BPH, GERD, CKD, HTN presents to the ED for left sided chest pain. Admit to telemetry to r/o ACS.     Problem/Plan - 1:  ·  Problem: Chest pain.  Plan: Admit to telemetry.   R/O ACS.-No evidence for recent cardiac injury-   Prior TTE-normal LV systolic function,no WMA-will repeat.  NST-07/17-EF 68%-FIXED inferior defect-no ischemia.  No further cardiac work-up.    Problem/Plan - 2:  ·  Problem: Lower extremity edema.  Plan: Venous dopplers-. No evidence of bilateral lower extremity deep venous thrombosis.Left  peroneal vein not identified.                                                 Problem/Plan - 3:  ·  Problem: Glaucoma.  Plan: Continue with alphagan, xalatan and timoptic.     Problem/Plan - 4:  ·  Problem: CKD (chronic kidney disease).  Plan: Creatinine at baseline.   Monitor kidney function and avoid nephrotoxic drugs.  Noted mild hematuria-no drop in Hgb,monitor.     Problem/Plan - 5:  ·  Problem: Hypertension.  Plan: Routine blood pressure check.  Continue with current medications.   Low salt,low cholesterol, DASH diet.     Problem/Plan - 6:  Problem: GERD (Gastroesophageal Reflux Disease). Plan: Continue with pantoprazole.    Problem/Plan - 7:  ·  Problem: Need for prophylactic measure.  Plan: Heparin SQ for vte prophylaxis.

## 2017-12-28 NOTE — PROGRESS NOTE ADULT - SUBJECTIVE AND OBJECTIVE BOX
Dr. Oreilly (Nephrology)  Office [phone]  Cell [phone]  Lillian ASENCIO  Cell [phone]      Patient is a 85y old  Male who presents with a chief complaint of Chest Pain (28 Dec 2017 12:22)      Patient seen and examined at bedside. No chest pain/sob    VITALS:  T(F): 98.5 (12-28-17 @ 13:02), Max: 98.5 (12-28-17 @ 13:02)  HR: 59 (12-28-17 @ 13:02)  BP: 116/70 (12-28-17 @ 13:02)  RR: 18 (12-28-17 @ 13:02)  SpO2: 100% (12-28-17 @ 13:02)  Wt(kg): --    12-27 @ 07:01  -  12-28 @ 07:00  --------------------------------------------------------  IN: 480 mL / OUT: 0 mL / NET: 480 mL    12-28 @ 07:01  -  12-28 @ 14:55  --------------------------------------------------------  IN: 120 mL / OUT: 0 mL / NET: 120 mL          PHYSICAL EXAM:  Constitutional: NAD  Neck: No JVD  Respiratory: CTAB, no wheezes, rales or rhonchi  Cardiovascular: S1, S2, RRR  Gastrointestinal: BS+, soft, NT/ND  Extremities: No peripheral edema    Hospital Medications:   MEDICATIONS  (STANDING):  aspirin  chewable 81 milliGRAM(s) Oral daily  atorvastatin 20 milliGRAM(s) Oral at bedtime  brimonidine 0.2% Ophthalmic Solution 1 Drop(s) Left EYE daily  finasteride 5 milliGRAM(s) Oral daily  heparin  Injectable 5000 Unit(s) SubCutaneous every 12 hours  latanoprost 0.005% Ophthalmic Solution 1 Drop(s) Left EYE at bedtime  metoprolol     tartrate 25 milliGRAM(s) Oral two times a day  pantoprazole    Tablet 40 milliGRAM(s) Oral before breakfast  tamsulosin 0.4 milliGRAM(s) Oral at bedtime  timolol 0.5% Solution 1 Drop(s) Left EYE daily      LABS:  12-27    142  |  108<H>  |  27<H>  ----------------------------<  77  4.5   |  22  |  1.81<H>    Ca    8.7      27 Dec 2017 08:20  Phos  2.2     12-27  Mg     2.1     12-27      Creatinine Trend: 1.81 <--, 1.40 <--, 2.00 <--, 2.12 <--                                10.8   3.81  )-----------( 124      ( 27 Dec 2017 06:15 )             31.8     Urine Studies:  Urinalysis - [12-26-17 @ 02:44]      Color PLYEL / Appearance CLEAR / SG 1.013 / pH 5.5      Gluc NEGATIVE / Ketone NEGATIVE  / Bili NEGATIVE / Urobili NORMAL       Blood SMALL / Protein 10 / Leuk Est NEGATIVE / Nitrite NEGATIVE      RBC 5-10 / WBC 0-2 / Hyaline  / Gran  / Sq Epi  / Non Sq Epi  / Bacteria       HbA1c 5.9      [12-26-17 @ 06:25]  TSH 1.69      [12-26-17 @ 06:25]  Lipid: chol 170, TG 54, HDL 63,       [12-26-17 @ 06:25]        RADIOLOGY & ADDITIONAL STUDIES:

## 2017-12-28 NOTE — DISCHARGE NOTE ADULT - CARE PROVIDER_API CALL
Deejay Montalvo (WALDO), Cardiology  [address] [place] [address]  Phone: [phone]  Fax: [phone]    Urology Clinic at [facility],   Please call for appointment  Or [facility] at [phone]  Please call for appointment  Phone: [phone]  Fax: (   )    -

## 2017-12-28 NOTE — DISCHARGE NOTE ADULT - MEDICATION SUMMARY - MEDICATIONS TO TAKE
I will START or STAY ON the medications listed below when I get home from the hospital:    finasteride 5 mg oral tablet  -- 1 tab(s) by mouth once a day  -- Indication: For Prostate    aspirin 81 mg oral tablet, chewable  -- 1 tab(s) by mouth once a day  -- Indication: For blood thinner    tamsulosin 0.4 mg oral capsule  -- 1 cap(s) by mouth once a day (at bedtime)  -- Indication: For Prostate    atorvastatin 20 mg oral tablet  -- 1 tab(s) by mouth once a day (at bedtime)  -- Indication: For Cholesterol    metoprolol tartrate 25 mg oral tablet  -- 1 tab(s) by mouth 2 times a day  -- Indication: For Heart rate    Lumigan 0.01% ophthalmic solution  -- 1 drop(s) in each affected eye once a day (in the evening)  -- Indication: For Glaucoma    Combigan 0.2%-0.5% ophthalmic solution  -- 2 drop(s) in each affected eye once a day  -- Indication: For Glaucoma    pantoprazole 40 mg oral delayed release tablet  -- 1 tab(s) by mouth once a day  -- Indication: For GERD (Gastroesophageal Reflux Disease)

## 2017-12-28 NOTE — DISCHARGE NOTE ADULT - PLAN OF CARE
stable No evidence for recent cardiac injury-   Prior TTE-normal LV systolic function,no WMA-will repeat.  NST-07/17-EF 68%-FIXED inferior defect-no ischemia.  No further cardiac work-up. Monitor your renal function carefully and prevent worsening renal function. Avoid NSAIDs and nephrotoxic agents (that may negatively affect your kidneys). Follow up with your nephrologist within 1-2 weeks of discharge. Continue to monitor your renal function. Avoid NSAIDs and nephrotoxic agents. Low K diet. improved Please follow up with Urology clinic for monitoring of blood in urine. Please call for appointment. No evidence for recent cardiac injury-   Prior TTE-normal LV systolic function, no WMA-will repeat.  NST-07/17-EF 68%-FIXED inferior defect-no ischemia.  No further cardiac work-up.

## 2017-12-28 NOTE — DISCHARGE NOTE ADULT - PATIENT PORTAL LINK FT
“You can access the [employer] Patient Portal, offered by [facility], by registering with the following website: [email or web address]”

## 2017-12-29 VITALS
TEMPERATURE: 98 F | HEART RATE: 58 BPM | WEIGHT: 132.28 LBS | SYSTOLIC BLOOD PRESSURE: 141 MMHG | RESPIRATION RATE: 17 BRPM | DIASTOLIC BLOOD PRESSURE: 84 MMHG | OXYGEN SATURATION: 100 %

## 2017-12-29 DIAGNOSIS — E87.0 HYPEROSMOLALITY AND HYPERNATREMIA: ICD-10-CM

## 2017-12-29 LAB
BUN SERPL-MCNC: 28 MG/DL — HIGH (ref 7–23)
CALCIUM SERPL-MCNC: 8.5 MG/DL — SIGNIFICANT CHANGE UP (ref 8.4–10.5)
CHLORIDE SERPL-SCNC: 112 MMOL/L — HIGH (ref 98–107)
CO2 SERPL-SCNC: 20 MMOL/L — LOW (ref 22–31)
CREAT SERPL-MCNC: 1.93 MG/DL — HIGH (ref 0.5–1.3)
GLUCOSE SERPL-MCNC: 83 MG/DL — SIGNIFICANT CHANGE UP (ref 70–99)
HCT VFR BLD CALC: 33.9 % — LOW (ref 39–50)
HGB BLD-MCNC: 11.3 G/DL — LOW (ref 13–17)
MCHC RBC-ENTMCNC: 27.7 PG — SIGNIFICANT CHANGE UP (ref 27–34)
MCHC RBC-ENTMCNC: 33.3 % — SIGNIFICANT CHANGE UP (ref 32–36)
MCV RBC AUTO: 83.1 FL — SIGNIFICANT CHANGE UP (ref 80–100)
NRBC # FLD: 0 — SIGNIFICANT CHANGE UP
PLATELET # BLD AUTO: 146 K/UL — LOW (ref 150–400)
PMV BLD: 12 FL — SIGNIFICANT CHANGE UP (ref 7–13)
POTASSIUM SERPL-MCNC: 4.7 MMOL/L — SIGNIFICANT CHANGE UP (ref 3.5–5.3)
POTASSIUM SERPL-SCNC: 4.7 MMOL/L — SIGNIFICANT CHANGE UP (ref 3.5–5.3)
RBC # BLD: 4.08 M/UL — LOW (ref 4.2–5.8)
RBC # FLD: 14.1 % — SIGNIFICANT CHANGE UP (ref 10.3–14.5)
SODIUM SERPL-SCNC: 147 MMOL/L — HIGH (ref 135–145)
WBC # BLD: 4.81 K/UL — SIGNIFICANT CHANGE UP (ref 3.8–10.5)
WBC # FLD AUTO: 4.81 K/UL — SIGNIFICANT CHANGE UP (ref 3.8–10.5)

## 2017-12-29 RX ADMIN — FINASTERIDE 5 MILLIGRAM(S): 5 TABLET, FILM COATED ORAL at 11:12

## 2017-12-29 RX ADMIN — HEPARIN SODIUM 5000 UNIT(S): 5000 INJECTION INTRAVENOUS; SUBCUTANEOUS at 07:02

## 2017-12-29 RX ADMIN — PANTOPRAZOLE SODIUM 40 MILLIGRAM(S): 20 TABLET, DELAYED RELEASE ORAL at 07:03

## 2017-12-29 RX ADMIN — Medication 1 DROP(S): at 11:11

## 2017-12-29 RX ADMIN — BRIMONIDINE TARTRATE 1 DROP(S): 2 SOLUTION/ DROPS OPHTHALMIC at 11:11

## 2017-12-29 RX ADMIN — Medication 81 MILLIGRAM(S): at 11:12

## 2017-12-29 RX ADMIN — Medication 25 MILLIGRAM(S): at 07:02

## 2017-12-29 NOTE — PROGRESS NOTE ADULT - ASSESSMENT
84 y/o M with h/o CVA with residual left sided weakness, BPH, GERD, CKD, HTN presents to the ED for chest pain

## 2017-12-29 NOTE — PROGRESS NOTE ADULT - SUBJECTIVE AND OBJECTIVE BOX
Dr. Oreilly (Nephrology)  Office [phone]  Cell [phone]  Lillian ASENCIO  Cell [phone]      Patient is a 85y old  Male who presents with a chief complaint of Chest Pain (28 Dec 2017 12:22)      Patient seen and examined at bedside. No chest pain/sob    VITALS:  T(F): 97.9 (12-29-17 @ 05:59), Max: 98.2 (12-28-17 @ 21:18)  HR: 58 (12-29-17 @ 05:59)  BP: 141/84 (12-29-17 @ 05:59)  RR: 17 (12-29-17 @ 05:59)  SpO2: 100% (12-29-17 @ 05:59)  Wt(kg): --    12-28 @ 07:01  -  12-29 @ 07:00  --------------------------------------------------------  IN: 120 mL / OUT: 0 mL / NET: 120 mL          PHYSICAL EXAM:  Constitutional: NAD  Neck: No JVD  Respiratory: CTAB, no wheezes, rales or rhonchi  Cardiovascular: S1, S2, RRR  Gastrointestinal: BS+, soft, NT/ND  Extremities: No peripheral edema    Hospital Medications:   MEDICATIONS  (STANDING):  aspirin  chewable 81 milliGRAM(s) Oral daily  atorvastatin 20 milliGRAM(s) Oral at bedtime  brimonidine 0.2% Ophthalmic Solution 1 Drop(s) Left EYE daily  finasteride 5 milliGRAM(s) Oral daily  heparin  Injectable 5000 Unit(s) SubCutaneous every 12 hours  latanoprost 0.005% Ophthalmic Solution 1 Drop(s) Left EYE at bedtime  metoprolol     tartrate 25 milliGRAM(s) Oral two times a day  pantoprazole    Tablet 40 milliGRAM(s) Oral before breakfast  tamsulosin 0.4 milliGRAM(s) Oral at bedtime  timolol 0.5% Solution 1 Drop(s) Left EYE daily      LABS:  12-29    147<H>  |  112<H>  |  28<H>  ----------------------------<  83  4.7   |  20<L>  |  1.93<H>    Ca    8.5      29 Dec 2017 06:00      Creatinine Trend: 1.93 <--, 1.81 <--, 1.40 <--, 2.00 <--, 2.12 <--                                11.3   4.81  )-----------( 146      ( 29 Dec 2017 06:00 )             33.9     Urine Studies:  Urinalysis - [12-26-17 @ 02:44]      Color PLYEL / Appearance CLEAR / SG 1.013 / pH 5.5      Gluc NEGATIVE / Ketone NEGATIVE  / Bili NEGATIVE / Urobili NORMAL       Blood SMALL / Protein 10 / Leuk Est NEGATIVE / Nitrite NEGATIVE      RBC 5-10 / WBC 0-2 / Hyaline  / Gran  / Sq Epi  / Non Sq Epi  / Bacteria       HbA1c 5.9      [12-26-17 @ 06:25]  TSH 1.69      [12-26-17 @ 06:25]  Lipid: chol 170, TG 54, HDL 63,       [12-26-17 @ 06:25]        RADIOLOGY & ADDITIONAL STUDIES:

## 2018-01-02 DIAGNOSIS — R69 ILLNESS, UNSPECIFIED: ICD-10-CM

## 2018-04-01 ENCOUNTER — OUTPATIENT (OUTPATIENT)
Dept: OUTPATIENT SERVICES | Facility: HOSPITAL | Age: 83
LOS: 1 days | End: 2018-04-01
Payer: MEDICAID

## 2018-04-01 PROCEDURE — G9001: CPT

## 2018-04-10 DIAGNOSIS — R69 ILLNESS, UNSPECIFIED: ICD-10-CM

## 2018-07-01 ENCOUNTER — OUTPATIENT (OUTPATIENT)
Dept: OUTPATIENT SERVICES | Facility: HOSPITAL | Age: 83
LOS: 1 days | End: 2018-07-01
Payer: MEDICARE

## 2018-07-01 PROCEDURE — G9001: CPT

## 2018-07-13 ENCOUNTER — EMERGENCY (EMERGENCY)
Facility: HOSPITAL | Age: 83
LOS: 1 days | Discharge: HOME | End: 2018-07-13
Attending: EMERGENCY MEDICINE | Admitting: EMERGENCY MEDICINE
Payer: MEDICARE

## 2018-07-13 VITALS
OXYGEN SATURATION: 100 % | TEMPERATURE: 98 F | RESPIRATION RATE: 14 BRPM | HEART RATE: 64 BPM | DIASTOLIC BLOOD PRESSURE: 104 MMHG | SYSTOLIC BLOOD PRESSURE: 176 MMHG

## 2018-07-13 VITALS
OXYGEN SATURATION: 100 % | RESPIRATION RATE: 16 BRPM | DIASTOLIC BLOOD PRESSURE: 92 MMHG | SYSTOLIC BLOOD PRESSURE: 138 MMHG | HEART RATE: 62 BPM | TEMPERATURE: 98 F

## 2018-07-13 LAB
ALBUMIN SERPL ELPH-MCNC: 3.6 G/DL — SIGNIFICANT CHANGE UP (ref 3.3–5)
ALP SERPL-CCNC: 59 U/L — SIGNIFICANT CHANGE UP (ref 40–120)
ALT FLD-CCNC: 6 U/L — SIGNIFICANT CHANGE UP (ref 4–41)
AST SERPL-CCNC: 25 U/L — SIGNIFICANT CHANGE UP (ref 4–40)
BASOPHILS # BLD AUTO: 0.03 K/UL — SIGNIFICANT CHANGE UP (ref 0–0.2)
BASOPHILS NFR BLD AUTO: 0.7 % — SIGNIFICANT CHANGE UP (ref 0–2)
BILIRUB SERPL-MCNC: 0.6 MG/DL — SIGNIFICANT CHANGE UP (ref 0.2–1.2)
BUN SERPL-MCNC: 21 MG/DL — SIGNIFICANT CHANGE UP (ref 7–23)
BUN SERPL-MCNC: 21 MG/DL — SIGNIFICANT CHANGE UP (ref 7–23)
CALCIUM SERPL-MCNC: 8.9 MG/DL — SIGNIFICANT CHANGE UP (ref 8.4–10.5)
CALCIUM SERPL-MCNC: 8.9 MG/DL — SIGNIFICANT CHANGE UP (ref 8.4–10.5)
CHLORIDE SERPL-SCNC: 108 MMOL/L — HIGH (ref 98–107)
CHLORIDE SERPL-SCNC: 109 MMOL/L — HIGH (ref 98–107)
CO2 SERPL-SCNC: 19 MMOL/L — LOW (ref 22–31)
CO2 SERPL-SCNC: 21 MMOL/L — LOW (ref 22–31)
CREAT SERPL-MCNC: 1.55 MG/DL — HIGH (ref 0.5–1.3)
CREAT SERPL-MCNC: 1.58 MG/DL — HIGH (ref 0.5–1.3)
EOSINOPHIL # BLD AUTO: 0.24 K/UL — SIGNIFICANT CHANGE UP (ref 0–0.5)
EOSINOPHIL NFR BLD AUTO: 5.4 % — SIGNIFICANT CHANGE UP (ref 0–6)
GLUCOSE SERPL-MCNC: 127 MG/DL — HIGH (ref 70–99)
GLUCOSE SERPL-MCNC: 87 MG/DL — SIGNIFICANT CHANGE UP (ref 70–99)
HCT VFR BLD CALC: 37.7 % — LOW (ref 39–50)
HGB BLD-MCNC: 12.4 G/DL — LOW (ref 13–17)
IMM GRANULOCYTES # BLD AUTO: 0.01 # — SIGNIFICANT CHANGE UP
IMM GRANULOCYTES NFR BLD AUTO: 0.2 % — SIGNIFICANT CHANGE UP (ref 0–1.5)
LYMPHOCYTES # BLD AUTO: 1.48 K/UL — SIGNIFICANT CHANGE UP (ref 1–3.3)
LYMPHOCYTES # BLD AUTO: 33.2 % — SIGNIFICANT CHANGE UP (ref 13–44)
MCHC RBC-ENTMCNC: 27.8 PG — SIGNIFICANT CHANGE UP (ref 27–34)
MCHC RBC-ENTMCNC: 32.9 % — SIGNIFICANT CHANGE UP (ref 32–36)
MCV RBC AUTO: 84.5 FL — SIGNIFICANT CHANGE UP (ref 80–100)
MONOCYTES # BLD AUTO: 0.44 K/UL — SIGNIFICANT CHANGE UP (ref 0–0.9)
MONOCYTES NFR BLD AUTO: 9.9 % — SIGNIFICANT CHANGE UP (ref 2–14)
NEUTROPHILS # BLD AUTO: 2.26 K/UL — SIGNIFICANT CHANGE UP (ref 1.8–7.4)
NEUTROPHILS NFR BLD AUTO: 50.6 % — SIGNIFICANT CHANGE UP (ref 43–77)
NRBC # FLD: 0 — SIGNIFICANT CHANGE UP
PLATELET # BLD AUTO: 128 K/UL — LOW (ref 150–400)
PMV BLD: 10.7 FL — SIGNIFICANT CHANGE UP (ref 7–13)
POTASSIUM SERPL-MCNC: 4.3 MMOL/L — SIGNIFICANT CHANGE UP (ref 3.5–5.3)
POTASSIUM SERPL-MCNC: 6.1 MMOL/L — HIGH (ref 3.5–5.3)
POTASSIUM SERPL-SCNC: 4.3 MMOL/L — SIGNIFICANT CHANGE UP (ref 3.5–5.3)
POTASSIUM SERPL-SCNC: 6.1 MMOL/L — HIGH (ref 3.5–5.3)
PROT SERPL-MCNC: 7.1 G/DL — SIGNIFICANT CHANGE UP (ref 6–8.3)
RBC # BLD: 4.46 M/UL — SIGNIFICANT CHANGE UP (ref 4.2–5.8)
RBC # FLD: 14.8 % — HIGH (ref 10.3–14.5)
SODIUM SERPL-SCNC: 139 MMOL/L — SIGNIFICANT CHANGE UP (ref 135–145)
SODIUM SERPL-SCNC: 144 MMOL/L — SIGNIFICANT CHANGE UP (ref 135–145)
TROPONIN T, HIGH SENSITIVITY: 31 NG/L — SIGNIFICANT CHANGE UP (ref ?–14)
TROPONIN T, HIGH SENSITIVITY: 34 NG/L — SIGNIFICANT CHANGE UP (ref ?–14)
WBC # BLD: 4.46 K/UL — SIGNIFICANT CHANGE UP (ref 3.8–10.5)
WBC # FLD AUTO: 4.46 K/UL — SIGNIFICANT CHANGE UP (ref 3.8–10.5)

## 2018-07-13 PROCEDURE — 71045 X-RAY EXAM CHEST 1 VIEW: CPT | Mod: 26

## 2018-07-13 PROCEDURE — 99284 EMERGENCY DEPT VISIT MOD MDM: CPT | Mod: 25,GC

## 2018-07-13 PROCEDURE — 93010 ELECTROCARDIOGRAM REPORT: CPT | Mod: 76

## 2018-07-13 NOTE — ED PROVIDER NOTE - PROGRESS NOTE DETAILS
Jonathan Weil, PGY2 - patient's son arrived in the ED. He reports that the patient was complaining of pain this morning and pointing to the L axilla. No falls - patient was under observation by his home health aid. Patient appears comfortable and in no apparent pain. Jonathan Weil, PGY2 - patient asymptomatic and appears well. GOC conversation with son notable for recognition that patient would be a poor candidate for cardiac intervention despite risk factors. Discharge home w/ cardiology f/u.

## 2018-07-13 NOTE — PROVIDER CONTACT NOTE (OTHER) - ASSESSMENT
Medical team referred this case as pt needs a ride home. Case was discussed with the medical team and decided to sent the pt through ambulance - bed bound. Writer met with  pts son Gerhard – [phone] informed pt has been medically cleared for discharge and pts son informed he is going home and will wait for the pt to come by EMS.  Writer called [facility]- EMS [phone] spoke with Hali to set up ambulance service and pt will be picked up around 3:30 pm through [facility]. Medical team was notified  time. Non Emergent ambulance form has been filled out by MD and has been attached to the pts envelope for EMS.

## 2018-07-13 NOTE — ED ADULT NURSE NOTE - OBJECTIVE STATEMENT
Pt presents to room 27, A&Ox1 (at baseline mental status), ambulatory at baseline with assistance x 1, pmhx of htn, cva with left sided weakness, gerd, and alzheimers, here for evaluation of left sided body pain.  Pt lives with son, this morning started c/o pain to left side of body when touched. family states pt did not fall and did not sustain any trauma. Pt presents to room 27, A&Ox1 (at baseline mental status), ambulatory at baseline with assistance x 1, pmhx of htn, cva with left sided weakness, gerd, and alzheimers, here for evaluation of left sided body pain.  Pt lives with son, this morning started c/o pain to left side of body when touched. family states pt did not fall and did not sustain any trauma. no skin breakdown noted, rectal temp 98.6.  labs drawn and sent, call bell in reach, side rails up, bed in locked position, md evaluation in progress, will continue to monitor.

## 2018-07-13 NOTE — ED PROVIDER NOTE - ATTENDING CONTRIBUTION TO CARE
85M  L side pain ?shoulder arm pain.  AAO x1, BIBA  Possible fall?  DA came, VS acceptable.  AAO x self only.  No focal tenderness.  Lungs clear.  Orientation at baseline per DA.  May have been having fever this week.  Apparently was in reclining wheelchair and was screaming, calm and denies c/o at present.  Does have LE edema bilat.  They have HHA as well.  Plan check EKG, labs, rectal temp.    VS:  HTN otherwise unremarkable    GEN - somewhat disheveled. ; A+O x1 (self).  Follows commands.     HEAD - NC/AT     ENT - PEERL, EOMI, mucous membranes dry , no discharge      NECK: Neck supple, non-tender without lymphadenopathy, no masses, no JVD  PULM - CTA b/l,  symmetric breath sounds  COR -  normal heart sounds    ABD - , ND, NT, soft, no guarding, no rebound, no masses    BACK - no CVA tenderness, nontender spine     EXTREMS - 2+ pitting edema, no deformity, warm and well perfused    SKIN - no rash or bruising      NEUROLOGIC - alert, CN 2-12 intact, sensation nl, motor RUE 5/5, RLE 4/5, L arm contractures. 85M  L side pain ?shoulder arm pain.  AAO x1, BIBA  Possible fall?  DA came, VS acceptable.  AAO x self only.  No focal tenderness.  Lungs clear.  Orientation at baseline per DA.  May have been having fever this week.  Apparently was in reclining wheelchair and was screaming, calm and denies c/o at present.  Does have LE edema bilat.  They have HHA as well.  Plan check EKG, labs, rectal temp.  Update - son came in, he was having L lateral CP but is better now.  Risk - benefit profile in favor of d/c home, given dementia would likely decompensate in hospital and no intervention likely to be done.  Labs acceptable.  CM to see pt to arrange home MD svc and possible hospital bed.    VS:  HTN otherwise unremarkable    GEN - somewhat disheveled. ; A+O x1 (self).  Follows commands.     HEAD - NC/AT     ENT - PEERL, EOMI, mucous membranes dry , no discharge      NECK: Neck supple, non-tender without lymphadenopathy, no masses, no JVD  PULM - CTA b/l,  symmetric breath sounds  COR -  normal heart sounds    ABD - , ND, NT, soft, no guarding, no rebound, no masses    BACK - no CVA tenderness, nontender spine     EXTREMS - 2+ pitting edema, no deformity, warm and well perfused    SKIN - no rash or bruising      NEUROLOGIC - alert, CN 2-12 intact, sensation nl, motor RUE 5/5, RLE 4/5, L arm contractures.

## 2018-07-13 NOTE — ED PROVIDER NOTE - OBJECTIVE STATEMENT
Patient A&Ox1. Unable to relate why he is in the hospital. Does not realize that is in the hospital.

## 2018-07-13 NOTE — ED ADULT TRIAGE NOTE - CHIEF COMPLAINT QUOTE
Pt c/o L shoulder pain. Per patient states "I fell... earlier today... at 9 am". Denies any pain at this time. EMS observed pain with movement on route. Pt has history of CVA with L sided deficits. Pt has left arm bent, neck to left side, unable to assess patient won't allow assessment. No obvious deformity observed. Pt is currently a&ox2 (self and location). Per EMS family states this is his baseline mental status secondary to dementia and did not fall today. Pt unsure if he hit head today, on aspirin 81mg daily. Family on route. Daughter: [phone] Son: [phone]. Hx of HTN, GERD, BPH. Pt observed to have bilateral pitting edema, per EMS this is baseline. EKG in progress.

## 2018-07-13 NOTE — ED PROVIDER NOTE - MEDICAL DECISION MAKING DETAILS
Jonathan Weil, PGY2 - no information available for why patient presented to ED. Triage/EMS report patient c/o a fall, but patient states this happened at 9 am (current time is 730 am), while family reported no fall. No external signs of trauma, patient minimally cooperative with exam. Will wait for family to arrive to obtain collateral.

## 2018-07-13 NOTE — ED ADULT NURSE NOTE - CHIEF COMPLAINT QUOTE
Pt c/o L shoulder pain. Per patient states "I fell... earlier today... at 9 am". Denies any pain at this time. EMS observed pain with movement on route. Pt has history of CVA with L sided deficits. Pt has left arm bent, neck to left side, unable to assess patient won't allow assessment. No obvious deformity observed. Pt is currently a&ox2 (self and location). Per EMS family states this is his baseline mental status secondary to dementia and did not fall today. Pt unsure if he hit head today, on aspirin 81mg daily. Family on route. Daughter: [phone] Son: [phone]. Hx of HTN, GERD, BPH. Pt observed to have bilateral pitting edema, per EMS this is baseline. EKG in progress.

## 2018-07-13 NOTE — ED PROVIDER NOTE - NEUROLOGICAL, MLM
Alert and oriented x1, disoriented to time and place, no focal deficits, moving all extremities spontaneously.

## 2018-07-24 DIAGNOSIS — Z71.89 OTHER SPECIFIED COUNSELING: ICD-10-CM

## 2018-08-04 ENCOUNTER — INPATIENT (INPATIENT)
Facility: HOSPITAL | Age: 83
LOS: 5 days | Discharge: NURSING, REHAB OR LONG-TERM CARE | End: 2018-08-10
Attending: INTERNAL MEDICINE | Admitting: INTERNAL MEDICINE
Payer: MEDICARE

## 2018-08-04 VITALS
TEMPERATURE: 99 F | SYSTOLIC BLOOD PRESSURE: 144 MMHG | HEART RATE: 62 BPM | DIASTOLIC BLOOD PRESSURE: 89 MMHG | RESPIRATION RATE: 16 BRPM | OXYGEN SATURATION: 100 %

## 2018-08-04 DIAGNOSIS — R07.9 CHEST PAIN, UNSPECIFIED: ICD-10-CM

## 2018-08-04 DIAGNOSIS — I10 ESSENTIAL (PRIMARY) HYPERTENSION: ICD-10-CM

## 2018-08-04 DIAGNOSIS — N18.9 CHRONIC KIDNEY DISEASE, UNSPECIFIED: ICD-10-CM

## 2018-08-04 DIAGNOSIS — K21.9 GASTRO-ESOPHAGEAL REFLUX DISEASE WITHOUT ESOPHAGITIS: ICD-10-CM

## 2018-08-04 DIAGNOSIS — H40.9 UNSPECIFIED GLAUCOMA: ICD-10-CM

## 2018-08-04 DIAGNOSIS — Z29.9 ENCOUNTER FOR PROPHYLACTIC MEASURES, UNSPECIFIED: ICD-10-CM

## 2018-08-04 LAB
ALBUMIN SERPL ELPH-MCNC: 3.6 G/DL — SIGNIFICANT CHANGE UP (ref 3.3–5)
ALP SERPL-CCNC: 68 U/L — SIGNIFICANT CHANGE UP (ref 40–120)
ALT FLD-CCNC: 9 U/L — SIGNIFICANT CHANGE UP (ref 4–41)
ANISOCYTOSIS BLD QL: SLIGHT — SIGNIFICANT CHANGE UP
AST SERPL-CCNC: 11 U/L — SIGNIFICANT CHANGE UP (ref 4–40)
BASOPHILS # BLD AUTO: 0.03 K/UL — SIGNIFICANT CHANGE UP (ref 0–0.2)
BASOPHILS NFR BLD AUTO: 0.9 % — SIGNIFICANT CHANGE UP (ref 0–2)
BASOPHILS NFR SPEC: 1.8 % — SIGNIFICANT CHANGE UP (ref 0–2)
BILIRUB SERPL-MCNC: 0.5 MG/DL — SIGNIFICANT CHANGE UP (ref 0.2–1.2)
BLASTS # FLD: 0 % — SIGNIFICANT CHANGE UP (ref 0–0)
BUN SERPL-MCNC: 22 MG/DL — SIGNIFICANT CHANGE UP (ref 7–23)
CALCIUM SERPL-MCNC: 8.8 MG/DL — SIGNIFICANT CHANGE UP (ref 8.4–10.5)
CHLORIDE SERPL-SCNC: 109 MMOL/L — HIGH (ref 98–107)
CK MB BLD-MCNC: 1.5 NG/ML — SIGNIFICANT CHANGE UP (ref 1–6.6)
CK MB BLD-MCNC: SIGNIFICANT CHANGE UP (ref 0–2.5)
CK SERPL-CCNC: 91 U/L — SIGNIFICANT CHANGE UP (ref 30–200)
CO2 SERPL-SCNC: 22 MMOL/L — SIGNIFICANT CHANGE UP (ref 22–31)
CREAT SERPL-MCNC: 1.89 MG/DL — HIGH (ref 0.5–1.3)
EOSINOPHIL # BLD AUTO: 0.25 K/UL — SIGNIFICANT CHANGE UP (ref 0–0.5)
EOSINOPHIL NFR BLD AUTO: 7.3 % — HIGH (ref 0–6)
EOSINOPHIL NFR FLD: 7.1 % — HIGH (ref 0–6)
GIANT PLATELETS BLD QL SMEAR: PRESENT — SIGNIFICANT CHANGE UP
GLUCOSE SERPL-MCNC: 95 MG/DL — SIGNIFICANT CHANGE UP (ref 70–99)
HCT VFR BLD CALC: 33.2 % — LOW (ref 39–50)
HGB BLD-MCNC: 11 G/DL — LOW (ref 13–17)
HYPOCHROMIA BLD QL: SLIGHT — SIGNIFICANT CHANGE UP
IMM GRANULOCYTES # BLD AUTO: 0.01 # — SIGNIFICANT CHANGE UP
IMM GRANULOCYTES NFR BLD AUTO: 0.3 % — SIGNIFICANT CHANGE UP (ref 0–1.5)
LIDOCAIN IGE QN: 33.1 U/L — SIGNIFICANT CHANGE UP (ref 7–60)
LYMPHOCYTES # BLD AUTO: 0.98 K/UL — LOW (ref 1–3.3)
LYMPHOCYTES # BLD AUTO: 28.7 % — SIGNIFICANT CHANGE UP (ref 13–44)
LYMPHOCYTES NFR SPEC AUTO: 15.1 % — SIGNIFICANT CHANGE UP (ref 13–44)
MCHC RBC-ENTMCNC: 28 PG — SIGNIFICANT CHANGE UP (ref 27–34)
MCHC RBC-ENTMCNC: 33.1 % — SIGNIFICANT CHANGE UP (ref 32–36)
MCV RBC AUTO: 84.5 FL — SIGNIFICANT CHANGE UP (ref 80–100)
METAMYELOCYTES # FLD: 0 % — SIGNIFICANT CHANGE UP (ref 0–1)
MONOCYTES # BLD AUTO: 0.44 K/UL — SIGNIFICANT CHANGE UP (ref 0–0.9)
MONOCYTES NFR BLD AUTO: 12.9 % — SIGNIFICANT CHANGE UP (ref 2–14)
MONOCYTES NFR BLD: 10.6 % — HIGH (ref 2–9)
MYELOCYTES NFR BLD: 0 % — SIGNIFICANT CHANGE UP (ref 0–0)
NEUTROPHIL AB SER-ACNC: 50.4 % — SIGNIFICANT CHANGE UP (ref 43–77)
NEUTROPHILS # BLD AUTO: 1.71 K/UL — LOW (ref 1.8–7.4)
NEUTROPHILS NFR BLD AUTO: 49.9 % — SIGNIFICANT CHANGE UP (ref 43–77)
NEUTS BAND # BLD: 0 % — SIGNIFICANT CHANGE UP (ref 0–6)
NRBC # FLD: 0 — SIGNIFICANT CHANGE UP
NT-PROBNP SERPL-SCNC: 251.6 PG/ML — SIGNIFICANT CHANGE UP
OTHER - HEMATOLOGY %: 0 — SIGNIFICANT CHANGE UP
PLATELET # BLD AUTO: 118 K/UL — LOW (ref 150–400)
PLATELET COUNT - ESTIMATE: SIGNIFICANT CHANGE UP
PMV BLD: 11.7 FL — SIGNIFICANT CHANGE UP (ref 7–13)
POTASSIUM SERPL-MCNC: 5 MMOL/L — SIGNIFICANT CHANGE UP (ref 3.5–5.3)
POTASSIUM SERPL-SCNC: 5 MMOL/L — SIGNIFICANT CHANGE UP (ref 3.5–5.3)
PROMYELOCYTES # FLD: 0 % — SIGNIFICANT CHANGE UP (ref 0–0)
PROT SERPL-MCNC: 6.8 G/DL — SIGNIFICANT CHANGE UP (ref 6–8.3)
RBC # BLD: 3.93 M/UL — LOW (ref 4.2–5.8)
RBC # FLD: 15 % — HIGH (ref 10.3–14.5)
SODIUM SERPL-SCNC: 141 MMOL/L — SIGNIFICANT CHANGE UP (ref 135–145)
TROPONIN T, HIGH SENSITIVITY: 27 NG/L — SIGNIFICANT CHANGE UP (ref ?–14)
TROPONIN T, HIGH SENSITIVITY: 27 NG/L — SIGNIFICANT CHANGE UP (ref ?–14)
VARIANT LYMPHS # BLD: 15 % — SIGNIFICANT CHANGE UP
WBC # BLD: 3.42 K/UL — LOW (ref 3.8–10.5)
WBC # FLD AUTO: 3.42 K/UL — LOW (ref 3.8–10.5)

## 2018-08-04 PROCEDURE — 71045 X-RAY EXAM CHEST 1 VIEW: CPT | Mod: 26

## 2018-08-04 RX ORDER — SODIUM CHLORIDE 5 %
1 DROPS OPHTHALMIC (EYE) THREE TIMES A DAY
Qty: 0 | Refills: 0 | Status: DISCONTINUED | OUTPATIENT
Start: 2018-08-04 | End: 2018-08-04

## 2018-08-04 RX ORDER — PANTOPRAZOLE SODIUM 20 MG/1
40 TABLET, DELAYED RELEASE ORAL
Qty: 0 | Refills: 0 | Status: DISCONTINUED | OUTPATIENT
Start: 2018-08-04 | End: 2018-08-10

## 2018-08-04 RX ORDER — BRIMONIDINE TARTRATE, TIMOLOL MALEATE 2; 5 MG/ML; MG/ML
2 SOLUTION/ DROPS OPHTHALMIC
Qty: 0 | Refills: 0 | COMMUNITY

## 2018-08-04 RX ORDER — CYCLOSPORINE 0.5 MG/ML
1 EMULSION OPHTHALMIC
Qty: 0 | Refills: 0 | Status: DISCONTINUED | OUTPATIENT
Start: 2018-08-04 | End: 2018-08-10

## 2018-08-04 RX ORDER — TAMSULOSIN HYDROCHLORIDE 0.4 MG/1
0.4 CAPSULE ORAL AT BEDTIME
Qty: 0 | Refills: 0 | Status: DISCONTINUED | OUTPATIENT
Start: 2018-08-04 | End: 2018-08-10

## 2018-08-04 RX ORDER — FUROSEMIDE 40 MG
20 TABLET ORAL DAILY
Qty: 0 | Refills: 0 | Status: DISCONTINUED | OUTPATIENT
Start: 2018-08-04 | End: 2018-08-09

## 2018-08-04 RX ORDER — ACETAMINOPHEN 500 MG
650 TABLET ORAL ONCE
Qty: 0 | Refills: 0 | Status: COMPLETED | OUTPATIENT
Start: 2018-08-04 | End: 2018-08-04

## 2018-08-04 RX ORDER — HALOPERIDOL DECANOATE 100 MG/ML
5 INJECTION INTRAMUSCULAR ONCE
Qty: 0 | Refills: 0 | Status: DISCONTINUED | OUTPATIENT
Start: 2018-08-04 | End: 2018-08-04

## 2018-08-04 RX ORDER — SODIUM CHLORIDE 9 MG/ML
3 INJECTION INTRAMUSCULAR; INTRAVENOUS; SUBCUTANEOUS ONCE
Qty: 0 | Refills: 0 | Status: COMPLETED | OUTPATIENT
Start: 2018-08-04 | End: 2018-08-04

## 2018-08-04 RX ORDER — TIMOLOL 0.5 %
1 DROPS OPHTHALMIC (EYE)
Qty: 0 | Refills: 0 | Status: DISCONTINUED | OUTPATIENT
Start: 2018-08-04 | End: 2018-08-10

## 2018-08-04 RX ORDER — BIMATOPROST 0.3 MG/ML
1 SOLUTION/ DROPS OPHTHALMIC
Qty: 0 | Refills: 0 | COMMUNITY

## 2018-08-04 RX ORDER — PANTOPRAZOLE SODIUM 20 MG/1
1 TABLET, DELAYED RELEASE ORAL
Qty: 0 | Refills: 0 | COMMUNITY

## 2018-08-04 RX ORDER — HALOPERIDOL DECANOATE 100 MG/ML
2.5 INJECTION INTRAMUSCULAR ONCE
Qty: 0 | Refills: 0 | Status: COMPLETED | OUTPATIENT
Start: 2018-08-04 | End: 2018-08-04

## 2018-08-04 RX ORDER — HEPARIN SODIUM 5000 [USP'U]/ML
5000 INJECTION INTRAVENOUS; SUBCUTANEOUS EVERY 8 HOURS
Qty: 0 | Refills: 0 | Status: DISCONTINUED | OUTPATIENT
Start: 2018-08-04 | End: 2018-08-06

## 2018-08-04 RX ORDER — ASPIRIN/CALCIUM CARB/MAGNESIUM 324 MG
81 TABLET ORAL DAILY
Qty: 0 | Refills: 0 | Status: DISCONTINUED | OUTPATIENT
Start: 2018-08-04 | End: 2018-08-10

## 2018-08-04 RX ORDER — HALOPERIDOL DECANOATE 100 MG/ML
2 INJECTION INTRAMUSCULAR ONCE
Qty: 0 | Refills: 0 | Status: DISCONTINUED | OUTPATIENT
Start: 2018-08-04 | End: 2018-08-04

## 2018-08-04 RX ORDER — LATANOPROST 0.05 MG/ML
1 SOLUTION/ DROPS OPHTHALMIC; TOPICAL AT BEDTIME
Qty: 0 | Refills: 0 | Status: DISCONTINUED | OUTPATIENT
Start: 2018-08-04 | End: 2018-08-10

## 2018-08-04 RX ORDER — GABAPENTIN 400 MG/1
300 CAPSULE ORAL DAILY
Qty: 0 | Refills: 0 | Status: DISCONTINUED | OUTPATIENT
Start: 2018-08-04 | End: 2018-08-10

## 2018-08-04 RX ORDER — BRIMONIDINE TARTRATE 2 MG/MG
1 SOLUTION/ DROPS OPHTHALMIC
Qty: 0 | Refills: 0 | Status: DISCONTINUED | OUTPATIENT
Start: 2018-08-04 | End: 2018-08-10

## 2018-08-04 RX ORDER — ATORVASTATIN CALCIUM 80 MG/1
20 TABLET, FILM COATED ORAL AT BEDTIME
Qty: 0 | Refills: 0 | Status: DISCONTINUED | OUTPATIENT
Start: 2018-08-04 | End: 2018-08-10

## 2018-08-04 RX ADMIN — Medication 650 MILLIGRAM(S): at 16:55

## 2018-08-04 RX ADMIN — SODIUM CHLORIDE 3 MILLILITER(S): 9 INJECTION INTRAMUSCULAR; INTRAVENOUS; SUBCUTANEOUS at 15:00

## 2018-08-04 RX ADMIN — Medication 650 MILLIGRAM(S): at 21:54

## 2018-08-04 RX ADMIN — HEPARIN SODIUM 5000 UNIT(S): 5000 INJECTION INTRAVENOUS; SUBCUTANEOUS at 21:54

## 2018-08-04 RX ADMIN — HALOPERIDOL DECANOATE 2.5 MILLIGRAM(S): 100 INJECTION INTRAMUSCULAR at 20:20

## 2018-08-04 NOTE — H&P ADULT - PROBLEM SELECTOR PLAN 1
Admit to telemetry.   Trend CE. EKG prn chest pain.   Pending NST vs. cardiac cath  check cbc,tsh,lipid,hemoglobin a1c, bmp with mag and phos.   f/u MD note

## 2018-08-04 NOTE — H&P ADULT - ASSESSMENT
86 y/o M with h/o dementia (baseline A and O X1-2), HTN, CVA with residual left sided weakness, back pain , CKD, presents to the ED for chest pain. Admit to telemetry to r/o ACS

## 2018-08-04 NOTE — ED ADULT TRIAGE NOTE - CHIEF COMPLAINT QUOTE
BIBEMS c/o substernal chest pain lasting approx. 2 hours which has since subsided. Denies sob, palpitations or dizziness. 2 asa given in field. PMHx: CVA with left sided residual weakness, HTN, CKD

## 2018-08-04 NOTE — ED PROVIDER NOTE - PROGRESS NOTE DETAILS
son is leaving, if we discharge patient requests ambulette bc is busy later. phone number [phone] - MD Damien,PhD - Resident: spoke to pts cardiologist Dr. Kory Brooks who is unsure they can Follow up with him on Monday. Also, patient has 1u drop in Hg and small bump in Cr. For these reasons will admit to tele. Endorsed to Dr. Montalvo.

## 2018-08-04 NOTE — ED ADULT NURSE NOTE - NSIMPLEMENTINTERV_GEN_ALL_ED
Implemented All Universal Safety Interventions:  [place] to call system. Call bell, personal items and telephone within reach. Instruct patient to call for assistance. Room bathroom lighting operational. Non-slip footwear when patient is off stretcher. Physically safe environment: no spills, clutter or unnecessary equipment. Stretcher in lowest position, wheels locked, appropriate side rails in place.

## 2018-08-04 NOTE — H&P ADULT - NSHPPHYSICALEXAM_GEN_ALL_CORE
GENERAL APPEARANCE: Well developed, well nourished, alert and cooperative, and appears to be in no acute distress.  HEAD: normocephalic.  EYES: PERRL, EOMI.   EARS: External auditory canals clear, hearing grossly intact.  NECK: Neck supple, non-tender without lymphadenopathy, masses or thyromegaly.  CARDIAC: Normal S1 and S2. No S3, S4 or murmurs. Rhythm is regular.   LUNGS: Clear to auscultation without rales, rhonchi, wheezing or diminished breath sounds.  ABDOMEN: Positive bowel sounds. Soft, nondistended, nontender. No guarding or rebound. No masses.  EXTREMITIES: No significant deformity or joint abnormality.2+ edema bilaterally. Peripheral pulses intact.   SKIN: Skin normal color, texture and turgor with no lesions or eruptions.  PSYCHIATRIC: The mental examination revealed the patient was oriented to person. .

## 2018-08-04 NOTE — ED ADULT NURSE NOTE - OBJECTIVE STATEMENT
Pt a&ox3 hx of cva with Lt sided residual weakness, breathing even and unlabored, denies cp/discomfort at present, denies headache/dizziness, abd soft, non-tender, non-distended, skin is cool dry and intact, ivl placed, labs sent, md at bedside, will continue to monitor.

## 2018-08-04 NOTE — ED PROVIDER NOTE - OBJECTIVE STATEMENT
85 male history of HTN, CVA with residual left sided weakness, back pain, CKD, anxiety here for chest pain. Onset 2 hours ago, sleeping on the couch, sharp, radiating to left shoulder, points substernal, has happened before "recently" but self resolved after a few hours but came today because not resolving and getting worse. No shortness of breath, no dizziness, diaphoresis. 85 male history of mild dementia, HTN, CVA with residual left sided weakness, back pain, CKD, anxiety here for chest pain. Onset 2 hours ago, sleeping on the couch, sharp, radiating to left shoulder, points substernal, has happened before "recently" but self resolved after a few hours but came today because not resolving and getting worse. No shortness of breath, no dizziness, diaphoresis. No recent illness or fevers/chills.

## 2018-08-04 NOTE — ED PROVIDER NOTE - ATTENDING CONTRIBUTION TO CARE
Dr. Meehan: 84 yo male with mild dementia, HTN, CVA in past with residual left body weakness and left UE contracture, CKD, anxiety, in ED with chest pain since 2 hours before presentation.  Pt states that he felt like he was "wrestling" and "someone punched me in the chest".  Pain in left chest and radiating into left shoulder, significantly improved before seeing me in ED.  No associated SOB, diaphoresis, cough, fever, N/V/D or abdominal pain.  On exam pt chronically-ill appearing but in NAD, heart RRR, lungs CTAB, abd NTND, LEs with equal 3+ pitting edema to mid calf but nontender and without erythema, strength 5/5 in right UE/LE, 4/5 in left UE/LE (with contracture in left UE) and skin without rash.  Son at bedside states that pt is at his bedside.

## 2018-08-04 NOTE — ED PROVIDER NOTE - NS ED ROS FT
GENERAL: No fever or chills, //             EYES: no change in vision, //             HEENT: no trouble swallowing or speaking, //                    PULMONARY: no cough or SOB, //             GI: no abdominal pain, no nausea or no vomiting, no diarrhea or constipation, //             : No changes in urination,  //            SKIN: no rashes,  //            NEURO: no headache,  //             MSK: No joint pain otherwise as HPI or negative. ~Phuc Quezada M.D., Ph.D. -Resident

## 2018-08-04 NOTE — ED PROVIDER NOTE - PHYSICAL EXAMINATION
Gen: NAD, AOx2, non-toxic //            Head: NCAT //            HEENT: EOMI,, left eye blind,  oral mucosa moist, normal conjunctiva //            Lung: CTAB, no respiratory distress, no wheezes/rhonchi/rales B/L, speaking in full sentences. //            CARDIAC: Regular rate and rhythm. Normal S1 and S2. No murmurs, rubs or gallops.  Equal pulses in upper and lower extremities bilaterally. No JVD. There is 2+ peripheral edema with no cyanosis or pallor. Extremities are warm and well perfused. Capillary refill is less than 2 seconds. No carotid bruits.  //            Abd: soft, NTND, no guarding, no CVA tenderness //            MSK: no visible deformities //            Neuro: LUE contraction and weakness, LLE weakness, otherwise grossly normal motor and sensory.  //            Skin: Warm, well perfused, no rash //            Psych: normal affect. ~Phuc Quezada M.D., Ph.D. -Resident

## 2018-08-04 NOTE — H&P ADULT - HISTORY OF PRESENT ILLNESS
84 y/o M with h/o dementia ( A and O X1-2 at baseline), HTN, CVA with residual left sided weakness, back pain, CKD, anxiety presents to the ED for chest pain. Pt is a poor historian and history is limited. Pt had chest pain while sleeping on the cough and it was on the left side of the chest radiating to the left shoulder. Pt had a similar episode before however it resolved. Pt states the chest pain was getting worse today thats why he came in. Pt denies LOC, syncope, fever, chills, SOB, dizziness, sweats, dysuria, urinary/bowel incontinence or any other complaints.

## 2018-08-04 NOTE — ED PROVIDER NOTE - MEDICAL DECISION MAKING DETAILS
Substernal chest pain in elderly patient, Will do cardiac evaluation with cardiac enzymes with repeat trop,, basic labs, lipase, ekg, cxr, dispo pending workup.

## 2018-08-04 NOTE — H&P ADULT - NSHPLABSRESULTS_GEN_ALL_CORE
LABS:                        11.0   3.42  )-----------( 118      ( 04 Aug 2018 14:30 )             33.2     08-04    141  |  109<H>  |  22  ----------------------------<  95  5.0   |  22  |  1.89<H>    Ca    8.8      04 Aug 2018 14:30    TPro  6.8  /  Alb  3.6  /  TBili  0.5  /  DBili  x   /  AST  11  /  ALT  9   /  AlkPhos  68  08-04        CAPILLARY BLOOD GLUCOSE      EKG shows NSR @ 63 bpm  TWI in III, V1

## 2018-08-05 LAB
BUN SERPL-MCNC: 19 MG/DL — SIGNIFICANT CHANGE UP (ref 7–23)
CALCIUM SERPL-MCNC: 8.6 MG/DL — SIGNIFICANT CHANGE UP (ref 8.4–10.5)
CHLORIDE SERPL-SCNC: 109 MMOL/L — HIGH (ref 98–107)
CHOLEST SERPL-MCNC: 180 MG/DL — SIGNIFICANT CHANGE UP (ref 120–199)
CK MB BLD-MCNC: 1.5 NG/ML — SIGNIFICANT CHANGE UP (ref 1–6.6)
CK SERPL-CCNC: 89 U/L — SIGNIFICANT CHANGE UP (ref 30–200)
CO2 SERPL-SCNC: 21 MMOL/L — LOW (ref 22–31)
CREAT SERPL-MCNC: 1.7 MG/DL — HIGH (ref 0.5–1.3)
GLUCOSE SERPL-MCNC: 75 MG/DL — SIGNIFICANT CHANGE UP (ref 70–99)
HBA1C BLD-MCNC: 5.2 % — SIGNIFICANT CHANGE UP (ref 4–5.6)
HCT VFR BLD CALC: 34.3 % — LOW (ref 39–50)
HDLC SERPL-MCNC: 55 MG/DL — SIGNIFICANT CHANGE UP (ref 35–55)
HGB BLD-MCNC: 11.6 G/DL — LOW (ref 13–17)
LIPID PNL WITH DIRECT LDL SERPL: 120 MG/DL — SIGNIFICANT CHANGE UP
MAGNESIUM SERPL-MCNC: 2.2 MG/DL — SIGNIFICANT CHANGE UP (ref 1.6–2.6)
MCHC RBC-ENTMCNC: 28.4 PG — SIGNIFICANT CHANGE UP (ref 27–34)
MCHC RBC-ENTMCNC: 33.8 % — SIGNIFICANT CHANGE UP (ref 32–36)
MCV RBC AUTO: 84.1 FL — SIGNIFICANT CHANGE UP (ref 80–100)
NRBC # FLD: 0 — SIGNIFICANT CHANGE UP
PHOSPHATE SERPL-MCNC: 2.3 MG/DL — LOW (ref 2.5–4.5)
PLATELET # BLD AUTO: 130 K/UL — LOW (ref 150–400)
PMV BLD: 12 FL — SIGNIFICANT CHANGE UP (ref 7–13)
POTASSIUM SERPL-MCNC: 4.6 MMOL/L — SIGNIFICANT CHANGE UP (ref 3.5–5.3)
POTASSIUM SERPL-SCNC: 4.6 MMOL/L — SIGNIFICANT CHANGE UP (ref 3.5–5.3)
RBC # BLD: 4.08 M/UL — LOW (ref 4.2–5.8)
RBC # FLD: 14.9 % — HIGH (ref 10.3–14.5)
SODIUM SERPL-SCNC: 142 MMOL/L — SIGNIFICANT CHANGE UP (ref 135–145)
TRIGL SERPL-MCNC: 63 MG/DL — SIGNIFICANT CHANGE UP (ref 10–149)
TROPONIN T, HIGH SENSITIVITY: 21 NG/L — SIGNIFICANT CHANGE UP (ref ?–14)
TSH SERPL-MCNC: 2.01 UIU/ML — SIGNIFICANT CHANGE UP (ref 0.27–4.2)
WBC # BLD: 4.93 K/UL — SIGNIFICANT CHANGE UP (ref 3.8–10.5)
WBC # FLD AUTO: 4.93 K/UL — SIGNIFICANT CHANGE UP (ref 3.8–10.5)

## 2018-08-05 PROCEDURE — 93306 TTE W/DOPPLER COMPLETE: CPT | Mod: 26

## 2018-08-05 RX ORDER — AMLODIPINE BESYLATE 2.5 MG/1
5 TABLET ORAL DAILY
Qty: 0 | Refills: 0 | Status: DISCONTINUED | OUTPATIENT
Start: 2018-08-05 | End: 2018-08-06

## 2018-08-05 RX ADMIN — AMLODIPINE BESYLATE 5 MILLIGRAM(S): 2.5 TABLET ORAL at 11:39

## 2018-08-05 RX ADMIN — Medication 1 DROP(S): at 09:06

## 2018-08-05 RX ADMIN — HEPARIN SODIUM 5000 UNIT(S): 5000 INJECTION INTRAVENOUS; SUBCUTANEOUS at 21:37

## 2018-08-05 RX ADMIN — LATANOPROST 1 DROP(S): 0.05 SOLUTION/ DROPS OPHTHALMIC; TOPICAL at 21:38

## 2018-08-05 RX ADMIN — CYCLOSPORINE 1 DROP(S): 0.5 EMULSION OPHTHALMIC at 17:26

## 2018-08-05 RX ADMIN — HEPARIN SODIUM 5000 UNIT(S): 5000 INJECTION INTRAVENOUS; SUBCUTANEOUS at 06:08

## 2018-08-05 RX ADMIN — TAMSULOSIN HYDROCHLORIDE 0.4 MILLIGRAM(S): 0.4 CAPSULE ORAL at 21:37

## 2018-08-05 RX ADMIN — Medication 20 MILLIGRAM(S): at 06:08

## 2018-08-05 RX ADMIN — Medication 1 DROP(S): at 15:43

## 2018-08-05 RX ADMIN — HEPARIN SODIUM 5000 UNIT(S): 5000 INJECTION INTRAVENOUS; SUBCUTANEOUS at 14:50

## 2018-08-05 RX ADMIN — ATORVASTATIN CALCIUM 20 MILLIGRAM(S): 80 TABLET, FILM COATED ORAL at 00:09

## 2018-08-05 RX ADMIN — Medication 81 MILLIGRAM(S): at 11:40

## 2018-08-05 RX ADMIN — Medication 1 DROP(S): at 17:26

## 2018-08-05 RX ADMIN — ATORVASTATIN CALCIUM 20 MILLIGRAM(S): 80 TABLET, FILM COATED ORAL at 21:37

## 2018-08-05 RX ADMIN — BRIMONIDINE TARTRATE 1 DROP(S): 2 SOLUTION/ DROPS OPHTHALMIC at 17:25

## 2018-08-05 RX ADMIN — PANTOPRAZOLE SODIUM 40 MILLIGRAM(S): 20 TABLET, DELAYED RELEASE ORAL at 06:09

## 2018-08-05 RX ADMIN — CYCLOSPORINE 1 DROP(S): 0.5 EMULSION OPHTHALMIC at 09:06

## 2018-08-05 RX ADMIN — TAMSULOSIN HYDROCHLORIDE 0.4 MILLIGRAM(S): 0.4 CAPSULE ORAL at 00:09

## 2018-08-05 RX ADMIN — BRIMONIDINE TARTRATE 1 DROP(S): 2 SOLUTION/ DROPS OPHTHALMIC at 09:06

## 2018-08-05 RX ADMIN — GABAPENTIN 300 MILLIGRAM(S): 400 CAPSULE ORAL at 11:38

## 2018-08-05 RX ADMIN — Medication 1 DROP(S): at 21:42

## 2018-08-05 RX ADMIN — LATANOPROST 1 DROP(S): 0.05 SOLUTION/ DROPS OPHTHALMIC; TOPICAL at 06:09

## 2018-08-05 NOTE — PATIENT PROFILE ADULT. - FUNCTIONAL SCREEN CURRENT LEVEL: TRANSFERRING, MLM
Chief complaint:   Chief Complaint   Patient presents with   • Abdominal Pain     emesis last week, has been having abdominal pain, emesis again. Decreased appetite. No regular BMs, has hardened and large stools. Slight fever last week, nothing now. ROom 7       Vitals:  Visit Vitals  /58   Pulse 78   Temp 97.5 °F (36.4 °C) (Temporal Artery)   Resp 16   Ht 4' 4\" (1.321 m)   Wt 36 kg   SpO2 99%   BMI 20.65 kg/m²       HISTORY OF PRESENT ILLNESS     Cullen Quiñonez is a 8 year old male brought in by mom for stomach pains.  Mom states he had 1 emesis last week and was complaining of his stomach hurting.  No diarrhea or fever at that time.  He spent the weekend at his dad's and had no issues.  This morning he started complaining of his stomach hurting again.  He wasn't hungry last night when he got back to mom's, which is abnormal for him.  Mom states he has large hard stools and has for a long time.         Other significant problems:  There are no active problems to display for this patient.      PAST MEDICAL, FAMILY AND SOCIAL HISTORY     Medications:  No current outpatient prescriptions on file.     No current facility-administered medications for this visit.        Allergies:  ALLERGIES:  No Known Allergies    Past Medical  History/Surgeries:  Past Medical History:   Diagnosis Date   • Otitis media        Past Surgical History:   Procedure Laterality Date   • Tympanostomy      bilateral       Family History:  History reviewed. No pertinent family history.    Social History:  Social History   Substance Use Topics   • Smoking status: Never Smoker   • Smokeless tobacco: Never Used   • Alcohol use No       REVIEW OF SYSTEMS     Review of Systems   All other systems reviewed and are negative.      PHYSICAL EXAM     Physical Exam   Constitutional: He appears well-developed and well-nourished. He is active. No distress.   Cardiovascular: Normal rate, regular rhythm, S1 normal and S2 normal.    No murmur  heard.  Pulmonary/Chest: Effort normal and breath sounds normal. No stridor. He has no wheezes. He has no rhonchi. He has no rales.   Abdominal: Soft. Bowel sounds are normal. He exhibits no distension. There is no hepatosplenomegaly. There is no tenderness. There is no rebound and no guarding.   Neurological: He is alert.   Skin: Skin is warm and dry. He is not diaphoretic.   Nursing note and vitals reviewed.      ASSESSMENT/PLAN     (R10.84) Generalized abdominal pain  (primary encounter diagnosis)  Comment:   Plan: XR ABDOMEN 1 VW KUB SUPINE        CATH Xr Abdomen 1 Vw Kub Supine    Result Date: 3/19/2018  XR ABDOMEN 1 VW KUB SUPINE HISTORY:  generalized abdominal pains, room 7     COMPARISON: None. TECHNIQUE:  An AP view of the abdomen was obtained. FINDINGS: There is a large amount of stool distributed of the colon. The bowel gas pattern appears within normal limits.    There are no gas filled dilated colonic or small bowel loops identified.    There is no intraperitoneal free air.    There are no abnormal calcifications identified.        IMPRESSION: 1. There is no evidence of obstruction or perforation.   An underlying element of constipation could be considered. 2.  There is no radiographic evidence of urolithiasis.         (K59.00) Constipation, unspecified constipation type  Comment:   Plan: recommend capful of miralax every day.  Once having regular soft normal bowel movements decrease use to maybe 1-2 times a week.  High fiber foods and increase fluids  Stop miralax if having diarrhea      (Z76.89) Encounter to establish care with new doctor  Comment:   Plan: SERVICE TO PEDIATRICS            Patient Instructions   Patient Education     Constipation (Child)    Bowel movement patterns vary in children. A child around age 2 will have about 2 bowel movements per day. After 4 years of age, a child may have 1 bowel movement per day.  A normal stool is soft and easy to pass. But sometimes stools become firm or  hard. They are difficult to pass. They may pass less often. This is called constipation. It is common in children. Each child's bowel habits are a little different. What seems like constipation in one child may be normal in another. Symptoms of constipation can include:  · Abdominal pain  · Refusal to eat  · Bloating  · Vomiting  · Streaks of blood in stools  · Problems holding in urine or stool  · Stool in your child's underwear  · Painful bowel movements  · Itching, swelling, bleeding, or pain around the anus  Constipation can have many causes, such as:  · Eating a diet low in fiber  · Eating too many dairy foods or processed foods  · Not drinking enough liquids  · Lack of exercise or physical activity  · Stress or changes in routine  · Frequent use or misuse of laxatives  · Ignoring the urge to have a bowel movement or delaying bowel movements  · Medicines such as prescription pain medicine, iron, antacids, certain antidepressants, and calcium supplements  · Less commonly, bowel blockage and bowel inflammation  Simple constipation is easy to stop once the cause is known. Healthcare providers may or may not do any tests to diagnose constipation.  Home care  Your child’s healthcare provider may prescribe a bowel stimulant, lubricant, or suppository. Your child may also need an enema or a laxative. Follow all instructions on how and when to use these products.  Food, drink, and habit changes  You can help treat and prevent your child’s constipation with some simple changes in diet and habits.  Make changes in your child’s diet, such as:  · Replace cow's milk with a nondairy milk or formula made from soy or rice.  · Increase fiber in your child’s diet. You can do this by adding fruits, vegetables, cereals, and grains.  · Make sure your child eats less meat and processed foods.  · Make sure your child drinks more water. Certain fruit juices such as pear, prune, and apple, can be helpful. However, fruit juices are full  of sugar so limit fruit juice to 2 to 4 ounces a day in children 4 to 8 months old, and 6 ounces in children 8 to 12 months old.  · Be patient and make diet changes over time. Most children can be fussy about food.  Help your child have good toilet habits. Make sure to:  · Teach your child not wait to have a bowel movement.  · Have your child sit on the toilet for 10 minutes at the same time each day. It is helpful to have your child sit after each meal. This helps to create a routine.  · Give your child a comfortable child’s toilet seat and a footstool.  · You can read or keep your child company to make it a positive experience.  Follow-up care  Follow up with your child’s healthcare provider.  Special note to parents  Learn to be familiar with your child’s normal bowel pattern. Note the color, form, and frequency of stools.  Call 911  Call 911 if your child has any of these symptoms:  · Firm belly that is very painful to the touch  · Trouble breathing  · Confusion  · Loss of consciousness  · Rapid heart rate  When to seek medical advice  Call your child’s healthcare provider right away if any of these occur:  · Abdominal pain that gets worse  · Fussiness or crying that can’t be soothed  · Refusal to drink or eat  · Blood in stool  · Black, tarry stool  · Constipation that does not get better  · Weight loss  · Your child is younger than 12 weeks and has a fever of 100.4°F (38°C)  or higher because your baby may need to be seen by his or her healthcare provider  · Your child is younger than 2 years old and his or her fever continues for more than 24 hours or your child 2 years or older has a fever for more than 3 days.  · A child 2 years or older has a fever for more than 3 days  · A child of any age has repeated fevers above 104°F (40°C)   © 5108-5935 [employer]. [address] [place] [address], [place] [address]. All rights reserved. This information is not intended as a substitute for professional medical  care. Always follow your healthcare professional's instructions.    Interested in decreasing your wait time in the Walk-in/Urgent Care Clinic?  Same day reservations can now be made on line.  Go to [email or web address] to see the wait times at each [facility] and to make a reservation at the site that is most convenient for you. We will do our best to honor your reservation time but please understand that wait times are subject to change once you arrive at the clinic.      Thank you for visiting the [facility]In, [place]     It is difficult to recognize all elements of any illness or injury in a single visit. The examination, treatment and x-rays received are on a preliminary basis only. A radiologist will also review your x-rays for final reading. Call your primary care provider if you have questions or problems before your next appointment. If you are unable to  reach your Primary Care Provider (PCP), please call or return to the Walk-In Clinic.  If symptoms worsen or do not resolve please follow-up with your Primary Care Provider (PCP), Walk-In or the nearest  Emergency Room for emergency symptoms.  If you are unsure of whom to follow up with, call [phone] and ask to speak with either your PCP, the Walk-In nurse or the on-call Provider if you are calling after hours.      If you are referred to a specialist or scheduled for a test, our Referrals department will call you with your appointment date and time within 3 business days. If you have not heard from them in this time frame, please call [phone] and ask for the Referrals department.     Test results: Unless otherwise instructed, you should be notified of test results within one week. Please call our office if you do not hear from us within this time frame at [phone].     Hours:  Monday through Friday: 7:00 am to 7:00 pm  Saturday: 8:00 am to 2:00 pm  Sunday: 8:00 am to 12:00 noon.  Holiday hours may vary,  please call [phone] on Holidays.  Walk-In is closed on Memorial Day, Thanksgiving Day, Eyal Day and  New Year's Day.      Thank you again for visiting [facility], [place].  Kimberli Peres NP    Help us to grow our quality of service!  We want to improve - and you can help!  You may receive a survey in the mail.  This is your opportunity to tell us what excellent service you received and where we could use improvement.  We value your input!                 (4) completely dependent

## 2018-08-05 NOTE — ED ADULT NURSE REASSESSMENT NOTE - NS ED NURSE REASSESS COMMENT FT1
Patient sleeping, VSS, pending bed assignment. Continuous cardiac monitoring in place. Safety maintained, needs attended, will continue to monitor.
[facility]
Report received from night RN Pt is currently resting. As per report, pt has been experiencing decline in mental status. Pt is A&O x 0. Pt is awake , able to follow commands, but slow to respond.  Pupils are unequal, L pupil > R pupil; as per report, this is normal. L sided extremities is weaker than right. Lungs sounds are diminished, respirations are equal and unlabored. Pulses present in all extremities. Slight swelling noted to L ankle. Pt has no non verbal indicator of pain and appear in NAD.
pt awake and alert. denies any chest pain or sob at this time. c/o left foot pain. foot appears edematous. states feet are always swollen but left is more swollen and painful than usual. MD made aware, states will order tylenol.
pt becoming increasingly confused and screaming. not answering questions or cooperating. MD made aware. order written for haldol. tele pa at bedside for eval.

## 2018-08-05 NOTE — CONSULT NOTE ADULT - SUBJECTIVE AND OBJECTIVE BOX
Reason for Admission: chest pain	  History of Present Illness: 	  86 y/o M with h/o dementia ( A and O X1-2 at baseline), HTN, CVA with residual left sided weakness, back pain, CKD, anxiety presents to the ED for chest pain. Pt is a poor historian and history is limited. Pt had chest pain while sleeping on the cough and it was on the left side of the chest radiating to the left shoulder. Pt had a similar episode before however it resolved. Pt states the chest pain was getting worse today thats why he came in. Pt denies LOC, syncope, fever, chills, SOB, dizziness, sweats, dysuria, urinary/bowel incontinence or any other complaints.     Review of Systems:  Review of Systems: ROS is limited due to history of dementia	      Allergies and Intolerances:        Allergies:  	No Known Allergies:     Home Medications:   * Patient Currently Takes Medications as of 04-Aug-2018 21:49 documented in Structured Notes  · 	aspirin 81 mg oral tablet, chewable: 1 tab(s) orally once a day, Last Dose Taken:    · 	atorvastatin 20 mg oral tablet: 1 tab(s) orally once a day (at bedtime), Last Dose Taken:    · 	tamsulosin 0.4 mg oral capsule: 1 cap(s) orally once a day (at bedtime), Last Dose Taken:    · 	pantoprazole 40 mg oral delayed release tablet: 1 tab(s) orally once a day, Last Dose Taken:    · 	Combigan 0.2%-0.5% ophthalmic solution: 1 drop(s) in the right eye 2 times a day, Last Dose Taken:    · 	Lumigan 0.01% ophthalmic solution: 1 drop(s) in the right eye once a day (in the evening), Last Dose Taken:    · 	Lasix 20 mg oral tablet: 1 tab(s) orally once a day, Last Dose Taken:    · 	gabapentin 300 mg oral tablet: 1 tab(s) orally once a day, Last Dose Taken:    · 	Restasis 0.05% ophthalmic emulsion: 1 drop(s) in the right eye every 12 hours, Last Dose Taken:    · 	Durezol 0.05% ophthalmic emulsion: 1 drop(s) in the left eye 2 times a day, Last Dose Taken:    · 	Sharifa 128 5% ophthalmic solution: 1 drop(s) to each affected eye 3 times a day, Last Dose Taken:    · 	Sharifa 128 ophthalmic ointment: 1 application to each affected eye once a day, Last Dose Taken:      .    Patient History:   Past Medical History:  Anxiety    Back Pain    CKD (chronic kidney disease)    CVA (cerebral infarction)    GERD (Gastroesophageal Reflux Disease)    Hypertension    Intractable Hiccups.    Past Surgical History:  History of Laminectomy    S/P Bilateral Inguinal Hernia Repair    Status Post Eye Surgery.    Family History:  No pertinent family history in first degree relatives.    Social History:  Social History (marital status, living situation, occupation, tobacco use, alcohol and drug use, and sexual history): Pt denies smoking, alcohol or drug use.	    Tobacco Screening:  · Core Measure Site	No	  · Has the patient used tobacco in the past 30 days?	No	    Risk Assessment:   Present on Admission:  Deep Venous Thrombosis	no	  Pulmonary Embolus	no	  Urinary Catheter	no	  Central Venous Catheter/PICC Line	no	  Surgical Site Incision	no	  Pressure Ulcer(s)	no	    Heart Failure:  Does this patient have a history of or has been diagnosed with heart failure? no.      Physical Exam:  Physical Exam: GENERAL APPEARANCE: Well developed, well nourished, alert and cooperative, and appears to be in no acute distress.  HEAD: normocephalic.  EYES: PERRL, EOMI.   EARS: External auditory canals clear, hearing grossly intact.  NECK: Neck supple, non-tender without lymphadenopathy, masses or thyromegaly.  CARDIAC: Normal S1 and S2. No S3, S4 or murmurs. Rhythm is regular.   LUNGS: Clear to auscultation without rales, rhonchi, wheezing or diminished breath sounds.  ABDOMEN: Positive bowel sounds. Soft, nondistended, nontender. No guarding or rebound. No masses.  EXTREMITIES: No significant deformity or joint abnormality.2+ edema bilaterally. Peripheral pulses intact.   SKIN: Skin normal color, texture and turgor with no lesions or eruptions. PSYCHIATRIC: The mental examination revealed the patient was oriented to person. .	      Labs and Results:  Labs, Radiology, Cardiology, and Other Results: LABS:                        11.0   3.42  )-----------( 118      ( 04 Aug 2018 14:30 )             33.2    08-04   141  |  109<H>  |  22  ----------------------------<  95  5.0   |  22  |  1.89<H>   Ca    8.8      04 Aug 2018 14:30   TPro  6.8  /  Alb  3.6  /  TBili  0.5  /  DBili  x   /  AST  11  /  ALT  9   /  AlkPhos  68  08-04     CAPILLARY BLOOD GLUCOSE    EKG shows NSR @ 63 bpm  TWI in III, V1	    Assessment and Plan:   Assessment:  · Assessment		  86 y/o M with h/o dementia (baseline A and O X1-2), HTN, CVA with residual left sided weakness, back pain , CKD, presents to the ED for chest pain. Admit to telemetry to r/o ACS    Problem/Plan - 1:  ·  Problem: Chest pain, unspecified type.  Plan: pt chest pain free at present , ischemic w/u as per cards     Problem/Plan - 2:  ·  Problem: CKD (chronic kidney disease).  Plan: Continue to monitor kidney function.   Avoid nephrotoxic drugs.     Problem/Plan - 3:  ·  Problem: Hypertension.  Plan: Routine blood pressure check.  Continue with current medications.   Low salt,low cholesterol, DASH diet.     Problem/Plan - 4:  ·  Problem: Glaucoma.  Plan: Continue with current eye drops.     Problem/Plan - 5:  ·  Problem: GERD (Gastroesophageal Reflux Disease).  Plan: Continue with pantoprazole.     Problem/Plan - 6:  Problem: Need for prophylactic measure. Plan: heparin SQ for vte prophlyaxis.

## 2018-08-06 DIAGNOSIS — N17.9 ACUTE KIDNEY FAILURE, UNSPECIFIED: ICD-10-CM

## 2018-08-06 DIAGNOSIS — R80.9 PROTEINURIA, UNSPECIFIED: ICD-10-CM

## 2018-08-06 DIAGNOSIS — N18.3 CHRONIC KIDNEY DISEASE, STAGE 3 (MODERATE): ICD-10-CM

## 2018-08-06 LAB
APTT BLD: 40.3 SEC — HIGH (ref 27.5–37.4)
BUN SERPL-MCNC: 20 MG/DL — SIGNIFICANT CHANGE UP (ref 7–23)
CALCIUM SERPL-MCNC: 9.3 MG/DL — SIGNIFICANT CHANGE UP (ref 8.4–10.5)
CHLORIDE SERPL-SCNC: 106 MMOL/L — SIGNIFICANT CHANGE UP (ref 98–107)
CO2 SERPL-SCNC: 20 MMOL/L — LOW (ref 22–31)
CREAT SERPL-MCNC: 1.63 MG/DL — HIGH (ref 0.5–1.3)
GLUCOSE SERPL-MCNC: 71 MG/DL — SIGNIFICANT CHANGE UP (ref 70–99)
HCT VFR BLD CALC: 37.5 % — LOW (ref 39–50)
HGB BLD-MCNC: 12.5 G/DL — LOW (ref 13–17)
INR BLD: 1.12 — SIGNIFICANT CHANGE UP (ref 0.88–1.17)
MCHC RBC-ENTMCNC: 27.5 PG — SIGNIFICANT CHANGE UP (ref 27–34)
MCHC RBC-ENTMCNC: 33.3 % — SIGNIFICANT CHANGE UP (ref 32–36)
MCV RBC AUTO: 82.4 FL — SIGNIFICANT CHANGE UP (ref 80–100)
NRBC # FLD: 0 — SIGNIFICANT CHANGE UP
PLATELET # BLD AUTO: 129 K/UL — LOW (ref 150–400)
PMV BLD: 11 FL — SIGNIFICANT CHANGE UP (ref 7–13)
POTASSIUM SERPL-MCNC: 4.6 MMOL/L — SIGNIFICANT CHANGE UP (ref 3.5–5.3)
POTASSIUM SERPL-SCNC: 4.6 MMOL/L — SIGNIFICANT CHANGE UP (ref 3.5–5.3)
PROTHROM AB SERPL-ACNC: 12.5 SEC — SIGNIFICANT CHANGE UP (ref 9.8–13.1)
RBC # BLD: 4.55 M/UL — SIGNIFICANT CHANGE UP (ref 4.2–5.8)
RBC # FLD: 14.8 % — HIGH (ref 10.3–14.5)
SODIUM SERPL-SCNC: 140 MMOL/L — SIGNIFICANT CHANGE UP (ref 135–145)
WBC # BLD: 4.65 K/UL — SIGNIFICANT CHANGE UP (ref 3.8–10.5)
WBC # FLD AUTO: 4.65 K/UL — SIGNIFICANT CHANGE UP (ref 3.8–10.5)

## 2018-08-06 PROCEDURE — 71045 X-RAY EXAM CHEST 1 VIEW: CPT | Mod: 26

## 2018-08-06 PROCEDURE — 78582 LUNG VENTILAT&PERFUS IMAGING: CPT | Mod: 26,GC

## 2018-08-06 RX ORDER — HEPARIN SODIUM 5000 [USP'U]/ML
5500 INJECTION INTRAVENOUS; SUBCUTANEOUS EVERY 6 HOURS
Qty: 0 | Refills: 0 | Status: DISCONTINUED | OUTPATIENT
Start: 2018-08-06 | End: 2018-08-08

## 2018-08-06 RX ORDER — HEPARIN SODIUM 5000 [USP'U]/ML
2500 INJECTION INTRAVENOUS; SUBCUTANEOUS EVERY 6 HOURS
Qty: 0 | Refills: 0 | Status: DISCONTINUED | OUTPATIENT
Start: 2018-08-06 | End: 2018-08-08

## 2018-08-06 RX ORDER — AMLODIPINE BESYLATE 2.5 MG/1
10 TABLET ORAL DAILY
Qty: 0 | Refills: 0 | Status: DISCONTINUED | OUTPATIENT
Start: 2018-08-07 | End: 2018-08-09

## 2018-08-06 RX ORDER — HEPARIN SODIUM 5000 [USP'U]/ML
5500 INJECTION INTRAVENOUS; SUBCUTANEOUS ONCE
Qty: 0 | Refills: 0 | Status: COMPLETED | OUTPATIENT
Start: 2018-08-06 | End: 2018-08-06

## 2018-08-06 RX ORDER — MORPHINE SULFATE 50 MG/1
2 CAPSULE, EXTENDED RELEASE ORAL ONCE
Qty: 0 | Refills: 0 | Status: DISCONTINUED | OUTPATIENT
Start: 2018-08-06 | End: 2018-08-06

## 2018-08-06 RX ORDER — ACETAMINOPHEN 500 MG
650 TABLET ORAL EVERY 6 HOURS
Qty: 0 | Refills: 0 | Status: DISCONTINUED | OUTPATIENT
Start: 2018-08-06 | End: 2018-08-10

## 2018-08-06 RX ORDER — HEPARIN SODIUM 5000 [USP'U]/ML
INJECTION INTRAVENOUS; SUBCUTANEOUS
Qty: 25000 | Refills: 0 | Status: DISCONTINUED | OUTPATIENT
Start: 2018-08-06 | End: 2018-08-08

## 2018-08-06 RX ORDER — AMLODIPINE BESYLATE 2.5 MG/1
5 TABLET ORAL ONCE
Qty: 0 | Refills: 0 | Status: COMPLETED | OUTPATIENT
Start: 2018-08-06 | End: 2018-08-06

## 2018-08-06 RX ADMIN — HEPARIN SODIUM 1200 UNIT(S)/HR: 5000 INJECTION INTRAVENOUS; SUBCUTANEOUS at 18:46

## 2018-08-06 RX ADMIN — CYCLOSPORINE 1 DROP(S): 0.5 EMULSION OPHTHALMIC at 05:07

## 2018-08-06 RX ADMIN — MORPHINE SULFATE 2 MILLIGRAM(S): 50 CAPSULE, EXTENDED RELEASE ORAL at 14:59

## 2018-08-06 RX ADMIN — Medication 20 MILLIGRAM(S): at 05:07

## 2018-08-06 RX ADMIN — TAMSULOSIN HYDROCHLORIDE 0.4 MILLIGRAM(S): 0.4 CAPSULE ORAL at 22:00

## 2018-08-06 RX ADMIN — AMLODIPINE BESYLATE 5 MILLIGRAM(S): 2.5 TABLET ORAL at 10:53

## 2018-08-06 RX ADMIN — HEPARIN SODIUM 5000 UNIT(S): 5000 INJECTION INTRAVENOUS; SUBCUTANEOUS at 13:42

## 2018-08-06 RX ADMIN — MORPHINE SULFATE 2 MILLIGRAM(S): 50 CAPSULE, EXTENDED RELEASE ORAL at 15:11

## 2018-08-06 RX ADMIN — GABAPENTIN 300 MILLIGRAM(S): 400 CAPSULE ORAL at 12:41

## 2018-08-06 RX ADMIN — Medication 1 DROP(S): at 05:07

## 2018-08-06 RX ADMIN — BRIMONIDINE TARTRATE 1 DROP(S): 2 SOLUTION/ DROPS OPHTHALMIC at 05:07

## 2018-08-06 RX ADMIN — HEPARIN SODIUM 5000 UNIT(S): 5000 INJECTION INTRAVENOUS; SUBCUTANEOUS at 05:07

## 2018-08-06 RX ADMIN — Medication 1 DROP(S): at 17:35

## 2018-08-06 RX ADMIN — Medication 1 DROP(S): at 21:56

## 2018-08-06 RX ADMIN — ATORVASTATIN CALCIUM 20 MILLIGRAM(S): 80 TABLET, FILM COATED ORAL at 21:56

## 2018-08-06 RX ADMIN — Medication 81 MILLIGRAM(S): at 12:41

## 2018-08-06 RX ADMIN — AMLODIPINE BESYLATE 5 MILLIGRAM(S): 2.5 TABLET ORAL at 05:07

## 2018-08-06 RX ADMIN — BRIMONIDINE TARTRATE 1 DROP(S): 2 SOLUTION/ DROPS OPHTHALMIC at 17:35

## 2018-08-06 RX ADMIN — Medication 1 DROP(S): at 13:42

## 2018-08-06 RX ADMIN — PANTOPRAZOLE SODIUM 40 MILLIGRAM(S): 20 TABLET, DELAYED RELEASE ORAL at 06:03

## 2018-08-06 RX ADMIN — LATANOPROST 1 DROP(S): 0.05 SOLUTION/ DROPS OPHTHALMIC; TOPICAL at 21:57

## 2018-08-06 RX ADMIN — HEPARIN SODIUM 5500 UNIT(S): 5000 INJECTION INTRAVENOUS; SUBCUTANEOUS at 18:53

## 2018-08-06 RX ADMIN — CYCLOSPORINE 1 DROP(S): 0.5 EMULSION OPHTHALMIC at 17:35

## 2018-08-06 NOTE — CONSULT NOTE ADULT - ASSESSMENT
86 y/o M with h/o dementia ( A and O X1-2 at baseline), HTN, CVA, CKD with residual left sided weakness, back pain, CKD, anxiety presents to the ED for chest pain

## 2018-08-06 NOTE — CONSULT NOTE ADULT - ASSESSMENT
84 y/o M with h/o dementia (baseline A and O X1-2), HTN, CVA with residual left sided weakness, back pain , CKD, presents to the ED for chest pain. Admit to telemetry to r/o ACS

## 2018-08-06 NOTE — CONSULT NOTE ADULT - SUBJECTIVE AND OBJECTIVE BOX
[facility] [place] [facility]PHROLOGY PRACTICE   MD NIC DIANA MD ANGELA WONG, PA    TEL:  OFFICE: [phone]  DR THOMPSON CELL: [phone]  DR. PEDRAZA CELL: [phone]  JULIANO SANTORO CELL: [phone]      HPI:  84 y/o M with h/o dementia ( A and O X1-2 at baseline), HTN, CVA, CKD with residual left sided weakness, back pain, CKD, anxiety presents to the ED for chest pain. Pt is a poor historian and history is limited.  Pt denies LOC, syncope, fever, chills, SOB, dizziness, sweats, dysuria, urinary/bowel incontinence or any other complaints.       Allergies:  No Known Allergies      PAST MEDICAL & SURGICAL HISTORY:  CKD (chronic kidney disease)  Hypertension  CVA (cerebral infarction)  GERD (Gastroesophageal Reflux Disease)  Anxiety  Intractable Hiccups  Back Pain  History of Laminectomy  Status Post Eye Surgery  S/P Bilateral Inguinal Hernia Repair      Home Medications Reviewed    Hospital Medications:   MEDICATIONS  (STANDING):  artificial tears (preservative free) Ophthalmic Solution 1 Drop(s) Left EYE three times a day  aspirin  chewable 81 milliGRAM(s) Oral daily  atorvastatin 20 milliGRAM(s) Oral at bedtime  brimonidine 0.2% Ophthalmic Solution 1 Drop(s) Right EYE two times a day  cycloSPORINE (RESTASIS) 0.05% Emulsion 1 Drop(s) Right EYE two times a day  furosemide    Tablet 20 milliGRAM(s) Oral daily  gabapentin 300 milliGRAM(s) Oral daily  heparin  Injectable 5000 Unit(s) SubCutaneous every 8 hours  latanoprost 0.005% Ophthalmic Solution 1 Drop(s) Right EYE at bedtime  pantoprazole    Tablet 40 milliGRAM(s) Oral before breakfast  tamsulosin 0.4 milliGRAM(s) Oral at bedtime  timolol 0.5% Solution 1 Drop(s) Right EYE two times a day      SOCIAL HISTORY:  Denies ETOh, Smoking,     FAMILY HISTORY:  No pertinent family history in first degree relatives      REVIEW OF SYSTEMS:  CONSTITUTIONAL: No weakness, fevers or chills  EYES/ENT: No visual changes;  No vertigo or throat pain   NECK: No pain or stiffness  RESPIRATORY: No cough, wheezing, hemoptysis; No shortness of breath  CARDIOVASCULAR: see HPI  GASTROINTESTINAL: No abdominal or epigastric pain. No nausea, vomiting, or hematemesis; No diarrhea or constipation. No melena or hematochezia.  GENITOURINARY: No dysuria, frequency, foamy urine, urinary urgency, incontinence or hematuria  NEUROLOGICAL: No numbness or weakness  SKIN: No itching, burning, rashes, or lesions   VASCULAR: No bilateral lower extremity edema.   All other review of systems is negative unless indicated above.    VITALS:  T(F): 97.5 (08-06-18 @ 13:38), Max: 98.1 (08-06-18 @ 10:14)  HR: 71 (08-06-18 @ 13:38)  BP: 120/82 (08-06-18 @ 13:38)  RR: 18 (08-06-18 @ 13:38)  SpO2: 100% (08-06-18 @ 13:38)  Wt(kg): --        PHYSICAL EXAM:  Constitutional: NAD, lethargic but arousable   HEENT: anicteric sclera, oropharynx clear, MMM  Neck: No JVD  Respiratory: CTAB, no wheezes, rales or rhonchi  Cardiovascular: S1, S2, RRR  Gastrointestinal: BS+, soft, NT/ND  Extremities: No cyanosis or clubbing. No peripheral edema  Neurological: A/O x 1 to self only  Psychiatric: Normal mood, normal affect  : No CVA tenderness. No odonnell.   Skin: No rashes    LABS:  08-06    140  |  106  |  20  ----------------------------<  71  4.6   |  20<L>  |  1.63<H>    Ca    9.3      06 Aug 2018 07:45  Phos  2.3     08-05  Mg     2.2     08-05    TPro  6.8  /  Alb  3.6  /  TBili  0.5  /  DBili      /  AST  11  /  ALT  9   /  AlkPhos  68  08-04    Creatinine Trend: 1.63 <--, 1.70 <--, 1.89 <--                        12.5   4.65  )-----------( 129      ( 06 Aug 2018 07:45 )             37.5     Urine Studies:        RADIOLOGY & ADDITIONAL STUDIES: [facility] [place] [facility]ROLOGY PRACTICE   MD NIC DIANA MD ANGELA WONG, PA    TEL:  OFFICE: [phone]  DR THOMPSON CELL: [phone]  DR. PEDRAZA CELL: [phone]  JULIANO SANTORO CELL: [phone]      HPI:  86 y/o M with h/o dementia ( A and O X1-2 at baseline), HTN, CVA, CKD with residual left sided weakness, back pain, CKD, anxiety presents to the ED for chest pain. Pt is a poor historian and history is limited.    nephrology consulted for elevated serum creatinine       Allergies:  No Known Allergies      PAST MEDICAL & SURGICAL HISTORY:  CKD (chronic kidney disease)  Hypertension  CVA (cerebral infarction)  GERD (Gastroesophageal Reflux Disease)  Anxiety  Intractable Hiccups  Back Pain  History of Laminectomy  Status Post Eye Surgery  S/P Bilateral Inguinal Hernia Repair      Home Medications Reviewed    Hospital Medications:   MEDICATIONS  (STANDING):  artificial tears (preservative free) Ophthalmic Solution 1 Drop(s) Left EYE three times a day  aspirin  chewable 81 milliGRAM(s) Oral daily  atorvastatin 20 milliGRAM(s) Oral at bedtime  brimonidine 0.2% Ophthalmic Solution 1 Drop(s) Right EYE two times a day  cycloSPORINE (RESTASIS) 0.05% Emulsion 1 Drop(s) Right EYE two times a day  furosemide    Tablet 20 milliGRAM(s) Oral daily  gabapentin 300 milliGRAM(s) Oral daily  heparin  Injectable 5000 Unit(s) SubCutaneous every 8 hours  latanoprost 0.005% Ophthalmic Solution 1 Drop(s) Right EYE at bedtime  pantoprazole    Tablet 40 milliGRAM(s) Oral before breakfast  tamsulosin 0.4 milliGRAM(s) Oral at bedtime  timolol 0.5% Solution 1 Drop(s) Right EYE two times a day      SOCIAL HISTORY:  Denies ETOh, Smoking,     FAMILY HISTORY:  No pertinent family history in first degree relatives      REVIEW OF SYSTEMS:  unable to obtain due to mental status     VITALS:  T(F): 97.5 (08-06-18 @ 13:38), Max: 98.1 (08-06-18 @ 10:14)  HR: 71 (08-06-18 @ 13:38)  BP: 120/82 (08-06-18 @ 13:38)  RR: 18 (08-06-18 @ 13:38)  SpO2: 100% (08-06-18 @ 13:38)  Wt(kg): --        PHYSICAL EXAM:  Constitutional: NAD, lethargic but arousable   HEENT: anicteric sclera, oropharynx clear, MMM  Neck: No JVD  Respiratory: CTAB, no wheezes, rales or rhonchi  Cardiovascular: S1, S2, RRR  Gastrointestinal: BS+, soft, NT/ND  Extremities: No cyanosis or clubbing. No peripheral edema  Neurological: A/O x 1 to self only  Psychiatric: Normal mood, normal affect  : No CVA tenderness. No odonnell.   Skin: No rashes    LABS:  08-06    140  |  106  |  20  ----------------------------<  71  4.6   |  20<L>  |  1.63<H>    Ca    9.3      06 Aug 2018 07:45  Phos  2.3     08-05  Mg     2.2     08-05    TPro  6.8  /  Alb  3.6  /  TBili  0.5  /  DBili      /  AST  11  /  ALT  9   /  AlkPhos  68  08-04    Creatinine Trend: 1.63 <--, 1.70 <--, 1.89 <--                        12.5   4.65  )-----------( 129      ( 06 Aug 2018 07:45 )             37.5     Urine Studies:        RADIOLOGY & ADDITIONAL STUDIES:

## 2018-08-06 NOTE — CONSULT NOTE ADULT - PROBLEM SELECTOR RECOMMENDATION 9
renal function elevated on admission now improved, etiology? possible prerenal vs ATN  continue to monitor bmp  avoid nephrotoxic agents

## 2018-08-06 NOTE — CHART NOTE - NSCHARTNOTEFT_GEN_A_CORE
84 y/o M with h/o dementia (baseline A and O X1-2), HTN, CVA with residual left sided weakness, back pain , CKD, presents to the ED for chest pain. Admit to telemery to r/o ACS.   Pt currently denies chest pain, sob, or palpitations. C/O occasional mid-lower back pain.    PAST MEDICAL HISTORY    CKD (chronic kidney disease)  Hypertension  CVA (cerebral infarction)  GERD (Gastroesophageal Reflux Disease)  Anxiety  Intractable Hiccups  Back Pain  History of Laminectomy  Status Post Eye Surgery  History of Non-Cataract Eye Surgery  S/P Bilateral Inguinal Hernia Repair        Intolerances      MEDICATIONS  (STANDING):  artificial tears (preservative free) Ophthalmic Solution 1 Drop(s) Left EYE three times a day  aspirin  chewable 81 milliGRAM(s) Oral daily  atorvastatin 20 milliGRAM(s) Oral at bedtime  brimonidine 0.2% Ophthalmic Solution 1 Drop(s) Right EYE two times a day  cycloSPORINE (RESTASIS) 0.05% Emulsion 1 Drop(s) Right EYE two times a day  furosemide    Tablet 20 milliGRAM(s) Oral daily  gabapentin 300 milliGRAM(s) Oral daily  heparin  Injectable 5000 Unit(s) SubCutaneous every 8 hours  latanoprost 0.005% Ophthalmic Solution 1 Drop(s) Right EYE at bedtime  pantoprazole    Tablet 40 milliGRAM(s) Oral before breakfast  tamsulosin 0.4 milliGRAM(s) Oral at bedtime  timolol 0.5% Solution 1 Drop(s) Right EYE two times a day    MEDICATIONS  (PRN):    Vital Signs Last 24 Hrs  T(C): 36.4 (06 Aug 2018 13:38), Max: 36.7 (06 Aug 2018 04:44)  T(F): 97.5 (06 Aug 2018 13:38), Max: 98.1 (06 Aug 2018 10:14)  HR: 96 (06 Aug 2018 15:03) (64 - 96)  BP: 158/96 (06 Aug 2018 15:03) (120/82 - 158/96)  BP(mean): --  RR: 18 (06 Aug 2018 15:03) (17 - 18)  SpO2: 99% (06 Aug 2018 15:03) (99% - 100%)        PHYSICAL EXAM:    ENMT:    Neck: supple  no JVD    Respiratory: B/L BS clear    Cardiovascular: S1 S2 RRR    Extremities: no edema    Neurological: Alert, Active, but confused (hx of dementia)                              12.5   4.65  )-----------( 129      ( 06 Aug 2018 07:45 )             37.5     08-06    140  |  106  |  20  ----------------------------<  71  4.6   |  20<L>  |  1.63<H>    Ca    9.3      06 Aug 2018 07:45  Phos  2.3     08-05  Mg     2.2     08-05      CARDIAC ENZYMES  Creatine Kinase, Serum:89 on 08-05 @ 00:19  Troponin T, Serum:-- on 08-05 @ 00:19  CKMB:1.50 on 08-05 @ 00:19  CKMB Relative Index: -- on 08-05 @ 00:19      CARDIAC ENZYMES  Creatine Kinase, Serum:91 on 08-04 @ 14:30  Troponin T, Serum:-- on 08-04 @ 14:30  CKMB:1.50 on 08-04 @ 14:30  CKMB Relative Index: Test not performed on 08-04 @ 14:30      TTE showed: + mod AR,  Mod TR, severe pulm HTN  on echo   VQ Scan revealed:  Intermediate probability of pulmonary embolus-->will start hep gtt after PTT/INR results.    PLAN:  -Pulmonary Dr. Jorge re-consulted and recommends CTPA  confirm PE and b/l LE dopplers.  -PT/PTT/INR ordered and when results will order heparin drip as discussed with Dr. Montalvo.  -Nephrology Dr. Oreilly re-consulted for need of CTPA in the setting of Acute on CKD-request doppler b/l LE and start the heparin drip.  -Tele monitor  -Continue pulse oxymetery  -VS as per routine    Will monitor pt, lab work and Vital signs. Above discussed with Dr. Montalvo

## 2018-08-06 NOTE — PROGRESS NOTE ADULT - SUBJECTIVE AND OBJECTIVE BOX
chief complaint : chest pain        SUBJECTIVE / OVERNIGHT EVENTS: pt denies chest pain, shortness of breath, nausea, v      MEDICATIONS  (STANDING):  artificial tears (preservative free) Ophthalmic Solution 1 Drop(s) Left EYE three times a day  aspirin  chewable 81 milliGRAM(s) Oral daily  atorvastatin 20 milliGRAM(s) Oral at bedtime  brimonidine 0.2% Ophthalmic Solution 1 Drop(s) Right EYE two times a day  cycloSPORINE (RESTASIS) 0.05% Emulsion 1 Drop(s) Right EYE two times a day  furosemide    Tablet 20 milliGRAM(s) Oral daily  gabapentin 300 milliGRAM(s) Oral daily  heparin  Infusion.  Unit(s)/Hr (12 mL/Hr) IV Continuous <Continuous>  latanoprost 0.005% Ophthalmic Solution 1 Drop(s) Right EYE at bedtime  pantoprazole    Tablet 40 milliGRAM(s) Oral before breakfast  tamsulosin 0.4 milliGRAM(s) Oral at bedtime  timolol 0.5% Solution 1 Drop(s) Right EYE two times a day    MEDICATIONS  (PRN):  acetaminophen   Tablet. 650 milliGRAM(s) Oral every 6 hours PRN mild and moderate pain  heparin  Injectable 5500 Unit(s) IV Push every 6 hours PRN For aPTT less than 40  heparin  Injectable 2500 Unit(s) IV Push every 6 hours PRN For aPTT between 40 - 57    Vital Signs Last 24 Hrs  T(C): 36.6 (06 Aug 2018 18:29), Max: 36.8 (06 Aug 2018 17:12)  T(F): 97.9 (06 Aug 2018 18:29), Max: 98.2 (06 Aug 2018 17:12)  HR: 68 (06 Aug 2018 18:29) (65 - 96)  BP: 122/84 (06 Aug 2018 18:29) (120/82 - 158/96)  BP(mean): --  RR: 20 (06 Aug 2018 18:29) (17 - 20)  SpO2: 100% (06 Aug 2018 18:29) (98% - 100%)    CAPILLARY BLOOD GLUCOSE        I&O's Summary      Constitutional: No fever, fatigue  Skin: No rash.  Eyes: No recent vision problems or eye pain.  ENT: No congestion, ear pain, or sore throat.  Cardiovascular: No chest pain or palpation.  Respiratory: No cough, shortness of breath, congestion, or wheezing.  Gastrointestinal: No abdominal pain, nausea, vomiting, or diarrhea.  Genitourinary: No dysuria.  Musculoskeletal: No joint swelling.  Neurologic: No headache.    PHYSICAL EXAM:  GENERAL: NAD  EYES: EOMI, PERRLA  NECK: Supple, No JVD  CHEST/LUNG: dec breath sounds at bases   HEART:  S1 , S2 +  ABDOMEN: soft, bs+  EXTREMITIES:  edema+  NEUROLOGY: alert awake oriented       LABS:                        12.5   4.65  )-----------( 129      ( 06 Aug 2018 07:45 )             37.5     08-06    140  |  106  |  20  ----------------------------<  71  4.6   |  20<L>  |  1.63<H>    Ca    9.3      06 Aug 2018 07:45  Phos  2.3     08-05  Mg     2.2     08-05      PT/INR - ( 06 Aug 2018 17:15 )   PT: 12.5 SEC;   INR: 1.12          PTT - ( 06 Aug 2018 17:15 )  PTT:40.3 SEC  CARDIAC MARKERS ( 05 Aug 2018 00:19 )  x     / x     / 89 u/L / 1.50 ng/mL / x              RADIOLOGY & ADDITIONAL TESTS:    Imaging Personally Reviewed:    Consultant(s) Notes Reviewed:      Care Discussed with Consultants/Other Providers:

## 2018-08-06 NOTE — PROGRESS NOTE ADULT - PROBLEM SELECTOR PLAN 2
~1.8 - 2, pt does not remember if he sees a nephrologist.  Renal function relatively stable  check pth, phos  spep, Serum immunoflorecence

## 2018-08-06 NOTE — CONSULT NOTE ADULT - PROBLEM SELECTOR RECOMMENDATION 5
mild, SPEP, SIF, K/L, hep b, hep C neg, possible sec to HTN. w/u done 12/2017  check urine protein/cr ratio

## 2018-08-06 NOTE — DISCHARGE NOTE ADULT - PRINCIPAL DIAGNOSIS
Addended by: NICKO YANCEY on: 8/6/2018 11:43 AM     Modules accepted: Orders     Atypical chest pain

## 2018-08-06 NOTE — CONSULT NOTE ADULT - PROBLEM SELECTOR RECOMMENDATION 9
workup in progress: he does have severe pulmonary hypertension on echo: His chest x-ray is clear: The clinical probability for PE is low: Would do vq scan and dopplers of bilateral LE:  Eventual cardiac cath per cardiology ? NST

## 2018-08-06 NOTE — CONSULT NOTE ADULT - SUBJECTIVE AND OBJECTIVE BOX
Patient is a 85y old  Male who presents with a chief complaint of chest pain (04 Aug 2018 22:17)      HPI:  86 y/o M with h/o dementia ( A and O X1-2 at baseline), HTN, CVA with residual left sided weakness, back pain, CKD, anxiety presents to the ED for chest pain. Pt is a poor historian and history is limited. Pt had chest pain while sleeping on the cough and it was on the left side of the chest radiating to the left shoulder. Pt had a similar episode before however it resolved. Pt states the chest pain was getting worse today thats why he came in. Pt denies LOC, syncope, fever, chills, SOB, dizziness, sweats, dysuria, urinary/bowel incontinence or any other complaints. (04 Aug 2018 22:17)      dw son on phone:   he has no underlying lung disease: He used to smoke for year: but long ago:  used to smoke 50 years ago:  He was bought to hospital because of the aide thinks he had pain ion his heart area:  He did not have any SOB :  he has no copd:   h eis not on any nebulizers"  he never had PE or DVT per his son:   he has dementia:  no cough , no fever as well asno chills:     ?FOLLOWING PRESENT  [ x] Hx of PE/DVT, [ x] Hx COPD, [ x] Hx of Asthma, [y ] Hx of Hospitalization, [ x]  Hx of BiPAP/CPAP use, [ x] Hx of HAILEY    Allergies    No Known Allergies    Intolerances        PAST MEDICAL & SURGICAL HISTORY:  CKD (chronic kidney disease)  Hypertension  CVA (cerebral infarction)  GERD (Gastroesophageal Reflux Disease)  Anxiety  Intractable Hiccups  Back Pain  History of Laminectomy  Status Post Eye Surgery  S/P Bilateral Inguinal Hernia Repair      FAMILY HISTORY:  No pertinent family history in first degree relatives      Social History: [ in childhood ] TOBACCO                  [ x ] ETOH                                 [ x ] IVDA/DRUGS    REVIEW OF SYSTEMS    unable to obtain from him  General:	    Skin/Breast:  	  Ophthalmologic:  	  ENMT:	    Respiratory and Thorax:  	  Cardiovascular:	    Gastrointestinal:	    Genitourinary:	    Musculoskeletal:	    Neurological:	    Psychiatric:	    Hematology/Lymphatics:	    Endocrine:	    Allergic/Immunologic:	    MEDICATIONS  (STANDING):  amLODIPine   Tablet 5 milliGRAM(s) Oral once  artificial tears (preservative free) Ophthalmic Solution 1 Drop(s) Left EYE three times a day  aspirin  chewable 81 milliGRAM(s) Oral daily  atorvastatin 20 milliGRAM(s) Oral at bedtime  brimonidine 0.2% Ophthalmic Solution 1 Drop(s) Right EYE two times a day  cycloSPORINE (RESTASIS) 0.05% Emulsion 1 Drop(s) Right EYE two times a day  furosemide    Tablet 20 milliGRAM(s) Oral daily  gabapentin 300 milliGRAM(s) Oral daily  heparin  Injectable 5000 Unit(s) SubCutaneous every 8 hours  latanoprost 0.005% Ophthalmic Solution 1 Drop(s) Right EYE at bedtime  pantoprazole    Tablet 40 milliGRAM(s) Oral before breakfast  tamsulosin 0.4 milliGRAM(s) Oral at bedtime  timolol 0.5% Solution 1 Drop(s) Right EYE two times a day    MEDICATIONS  (PRN):       Vital Signs Last 24 Hrs  T(C): 36.7 (06 Aug 2018 10:14), Max: 36.7 (06 Aug 2018 04:44)  T(F): 98.1 (06 Aug 2018 10:14), Max: 98.1 (06 Aug 2018 10:14)  HR: 95 (06 Aug 2018 10:14) (62 - 95)  BP: 148/106 (06 Aug 2018 10:14) (134/78 - 167/97)  BP(mean): --  RR: 18 (06 Aug 2018 10:14) (17 - 18)  SpO2: 100% (06 Aug 2018 10:14) (98% - 100%)        I&O's Summary      Physical Exam:   GENERAL: NAD, well-groomed, well-developed  HEENT: HARSHIL/   Atraumatic, Normocephalic  ENMT: No tonsillar erythema, exudates, or enlargement; Moist mucous membranes, Good dentition, No lesions  NECK: Supple, No JVD, Normal thyroid  CHEST/LUNG: decreased air entry bilaterally   CVS: Regular rate and rhythm; No murmurs, rubs, or gallops  GI: : Soft, Nontender, Nondistended; Bowel sounds present  NERVOUS SYSTEM:  Alert & Oriented X0  EXTREMITIES:  -edema  LYMPH: No lymphadenopathy noted  SKIN: No rashes or lesions  ENDOCRINOLOGY: No Thyromegaly  PSYCH: Calm    Labs:    CARDIAC MARKERS ( 05 Aug 2018 00:19 )  x     / x     / 89 u/L / 1.50 ng/mL / x      CARDIAC MARKERS ( 04 Aug 2018 14:30 )  x     / x     / 91 u/L / 1.50 ng/mL / x                                12.5   4.65  )-----------( 129      ( 06 Aug 2018 07:45 )             37.5                         11.6   4.93  )-----------( 130      ( 05 Aug 2018 06:40 )             34.3                         11.0   3.42  )-----------( 118      ( 04 Aug 2018 14:30 )             33.2     08-06    140  |  106  |  20  ----------------------------<  71  4.6   |  20<L>  |  1.63<H>  08-05    142  |  109<H>  |  19  ----------------------------<  75  4.6   |  21<L>  |  1.70<H>  08-04    141  |  109<H>  |  22  ----------------------------<  95  5.0   |  22  |  1.89<H>    Ca    9.3      06 Aug 2018 07:45  Ca    8.6      05 Aug 2018 06:40  Ca    8.8      04 Aug 2018 14:30  Phos  2.3     08-05  Mg     2.2     08-05    TPro  6.8  /  Alb  3.6  /  TBili  0.5  /  DBili  x   /  AST  11  /  ALT  9   /  AlkPhos  68  08-04    CAPILLARY BLOOD GLUCOSE        LIVER FUNCTIONS - ( 04 Aug 2018 14:30 )  Alb: 3.6 g/dL / Pro: 6.8 g/dL / ALK PHOS: 68 u/L / ALT: 9 u/L / AST: 11 u/L / GGT: x               D DImer  Serum Pro-Brain Natriuretic Peptide: 251.6 pg/mL (08-04 @ 14:30)      Studies  Chest X-RAY  CT SCAN Chest   CT Abdomen  Venous Dopplers: LE:   Others        < from: Xray Chest 1 View AP/PA (08.04.18 @ 15:50) >  EXAM:  XR CHEST AP OR PA 1V        PROCEDURE DATE:  Aug  4 2018         INTERPRETATION:  CLINICAL INFORMATION: Chest pain.    EXAM: AP portable chest.    COMPARISON: Chest radiograph from 7/13/2018.    IMPRESSION:    The lungs are clear..    The cardiomediastinal silhouette is unremarkable.    The visualized osseous and soft tissue structures demonstrate no acute   pathology.          < from: CT Chest w/Cont (05.12.15 @ 09:24) >  CHEST:     LUNGS AND LARGE AIRWAYS: Minimal mucous in the left mainstem bronchus.No   pulmonary nodules.  PLEURA: Small left pleural effusion.  VESSELS: Within normal limits.  HEART: Heart size is normal.No pericardial effusion.  MEDIASTINUM AND MARBELLA: No lymphadenopathy.  CHEST WALL AND LOWER NECK: Within normal limits.    < end of copied text >        TAZ WALEDN M.D., RADIOLOGY RESIDENT  This document has been electronically signed.  NICHO ENGLAND M.D. ATTENDING RADIOLOGIST  This document has been electronically signed. Aug  5 2018 11:09AM    < end of copied text >        < from: Transthoracic Echocardiogram (08.05.18 @ 10:38) >  regurgitation.  Pericardium/PleuraNormal pericardium with no pericardial  effusion.  Hemodynamic: Estimated right ventricular systolic pressure  equals 68 mm Hg, assuming right atrial pressure equals 10  mm Hg, consistent with severe pulmonary hypertension.  ------------------------------------------------------------------------  CONCLUSIONS:  1. Mitral annular calcification, otherwise normal mitral  valve. Minimal mitral regurgitation.  2. Calcified trileaflet aortic valve with normal opening.  Moderate aortic regurgitation.  3. Increased relative wall thickness with normal left  ventricular mass index, consistent with concentric left  ventricular remodeling.  4. Normal left ventricular systolic function. No segmental  wall motion abnormalities.  5. Mild diastolic dysfunction (Stage I).  6. Normal right ventricular size and function.  7. Normal tricuspid valve.  Moderate tricuspid  regurgitation.  8. Estimated pulmonary artery systolic pressure equals 68  mm Hg, assuming right atrial pressure equals 10  mm Hg,  consistent with severe pulmonary hypertension.  ------------------------------------------------------------------------  Confirmed on  8/5/2018 - 12:16:11 by Ky Delarosa MD, [facility] RPVI  ------------------------------------------------------------------------    < end of copied text >  < from: Transthoracic Echocardiogram (08.05.18 @ 10:38) >  regurgitation.  Pericardium/PleuraNormal pericardium with no pericardial  effusion.  Hemodynamic: Estimated right ventricular systolic pressure  equals 68 mm Hg, assuming right atrial pressure equals 10  mm Hg, consistent with severe pulmonary hypertension.  ------------------------------------------------------------------------  CONCLUSIONS:  1. Mitral annular calcification, otherwise normal mitral  valve. Minimal mitral regurgitation.  2. Calcified trileaflet aortic valve with normal opening.  Moderate aortic regurgitation.  3. Increased relative wall thickness with normal left  ventricular mass index, consistent with concentric left  ventricular remodeling.  4. Normal left ventricular systolic function. No segmental  wall motion abnormalities.  5. Mild diastolic dysfunction (Stage I).  6. Normal right ventricular size and function.  7. Normal tricuspid valve.  Moderate tricuspid  regurgitation.  8. Estimated pulmonary artery systolic pressure equals 68  mm Hg, assuming right atrial pressure equals 10  mm Hg,  consistent with severe pulmonary hypertension.  ------------------------------------------------------------------------  Confirmed on  8/5/2018 - 12:16:11 by Ky Delarosa MD, [facility] RPVI  ------------------------------------------------------------------------    < end of copied text >

## 2018-08-07 DIAGNOSIS — E87.2 ACIDOSIS: ICD-10-CM

## 2018-08-07 LAB
APTT BLD: > 200 SEC — CRITICAL HIGH (ref 27.5–37.4)
BUN SERPL-MCNC: 21 MG/DL — SIGNIFICANT CHANGE UP (ref 7–23)
CALCIUM SERPL-MCNC: 8.8 MG/DL — SIGNIFICANT CHANGE UP (ref 8.4–10.5)
CHLORIDE SERPL-SCNC: 105 MMOL/L — SIGNIFICANT CHANGE UP (ref 98–107)
CO2 SERPL-SCNC: 19 MMOL/L — LOW (ref 22–31)
CREAT SERPL-MCNC: 1.67 MG/DL — HIGH (ref 0.5–1.3)
D DIMER BLD IA.RAPID-MCNC: 363 NG/ML — SIGNIFICANT CHANGE UP
D DIMER BLD IA.RAPID-MCNC: 510 NG/ML — SIGNIFICANT CHANGE UP
GLUCOSE SERPL-MCNC: 82 MG/DL — SIGNIFICANT CHANGE UP (ref 70–99)
HCT VFR BLD CALC: 33.6 % — LOW (ref 39–50)
HCT VFR BLD CALC: 35.8 % — LOW (ref 39–50)
HGB BLD-MCNC: 11.3 G/DL — LOW (ref 13–17)
HGB BLD-MCNC: 12.3 G/DL — LOW (ref 13–17)
MCHC RBC-ENTMCNC: 27.4 PG — SIGNIFICANT CHANGE UP (ref 27–34)
MCHC RBC-ENTMCNC: 28.3 PG — SIGNIFICANT CHANGE UP (ref 27–34)
MCHC RBC-ENTMCNC: 33.6 % — SIGNIFICANT CHANGE UP (ref 32–36)
MCHC RBC-ENTMCNC: 34.4 % — SIGNIFICANT CHANGE UP (ref 32–36)
MCV RBC AUTO: 81.4 FL — SIGNIFICANT CHANGE UP (ref 80–100)
MCV RBC AUTO: 82.5 FL — SIGNIFICANT CHANGE UP (ref 80–100)
NRBC # FLD: 0 — SIGNIFICANT CHANGE UP
NRBC # FLD: 0 — SIGNIFICANT CHANGE UP
PLATELET # BLD AUTO: 126 K/UL — LOW (ref 150–400)
PLATELET # BLD AUTO: 134 K/UL — LOW (ref 150–400)
PMV BLD: 11.2 FL — SIGNIFICANT CHANGE UP (ref 7–13)
PMV BLD: 12 FL — SIGNIFICANT CHANGE UP (ref 7–13)
POTASSIUM SERPL-MCNC: 4.1 MMOL/L — SIGNIFICANT CHANGE UP (ref 3.5–5.3)
POTASSIUM SERPL-SCNC: 4.1 MMOL/L — SIGNIFICANT CHANGE UP (ref 3.5–5.3)
PTH-INTACT SERPL-MCNC: 86.67 PG/ML — HIGH (ref 15–65)
RBC # BLD: 4.13 M/UL — LOW (ref 4.2–5.8)
RBC # BLD: 4.34 M/UL — SIGNIFICANT CHANGE UP (ref 4.2–5.8)
RBC # FLD: 14.5 % — SIGNIFICANT CHANGE UP (ref 10.3–14.5)
RBC # FLD: 14.6 % — HIGH (ref 10.3–14.5)
SODIUM SERPL-SCNC: 139 MMOL/L — SIGNIFICANT CHANGE UP (ref 135–145)
WBC # BLD: 4.06 K/UL — SIGNIFICANT CHANGE UP (ref 3.8–10.5)
WBC # BLD: 4.21 K/UL — SIGNIFICANT CHANGE UP (ref 3.8–10.5)
WBC # FLD AUTO: 4.06 K/UL — SIGNIFICANT CHANGE UP (ref 3.8–10.5)
WBC # FLD AUTO: 4.21 K/UL — SIGNIFICANT CHANGE UP (ref 3.8–10.5)

## 2018-08-07 PROCEDURE — 93970 EXTREMITY STUDY: CPT | Mod: 26

## 2018-08-07 RX ADMIN — AMLODIPINE BESYLATE 10 MILLIGRAM(S): 2.5 TABLET ORAL at 05:23

## 2018-08-07 RX ADMIN — GABAPENTIN 300 MILLIGRAM(S): 400 CAPSULE ORAL at 11:43

## 2018-08-07 RX ADMIN — Medication 81 MILLIGRAM(S): at 11:43

## 2018-08-07 RX ADMIN — BRIMONIDINE TARTRATE 1 DROP(S): 2 SOLUTION/ DROPS OPHTHALMIC at 17:19

## 2018-08-07 RX ADMIN — BRIMONIDINE TARTRATE 1 DROP(S): 2 SOLUTION/ DROPS OPHTHALMIC at 05:24

## 2018-08-07 RX ADMIN — Medication 1 DROP(S): at 17:20

## 2018-08-07 RX ADMIN — TAMSULOSIN HYDROCHLORIDE 0.4 MILLIGRAM(S): 0.4 CAPSULE ORAL at 21:17

## 2018-08-07 RX ADMIN — HEPARIN SODIUM 1000 UNIT(S)/HR: 5000 INJECTION INTRAVENOUS; SUBCUTANEOUS at 04:01

## 2018-08-07 RX ADMIN — Medication 1 DROP(S): at 05:24

## 2018-08-07 RX ADMIN — CYCLOSPORINE 1 DROP(S): 0.5 EMULSION OPHTHALMIC at 18:26

## 2018-08-07 RX ADMIN — Medication 20 MILLIGRAM(S): at 05:23

## 2018-08-07 RX ADMIN — Medication 1 DROP(S): at 05:23

## 2018-08-07 RX ADMIN — PANTOPRAZOLE SODIUM 40 MILLIGRAM(S): 20 TABLET, DELAYED RELEASE ORAL at 05:23

## 2018-08-07 RX ADMIN — HEPARIN SODIUM 0 UNIT(S)/HR: 5000 INJECTION INTRAVENOUS; SUBCUTANEOUS at 03:01

## 2018-08-07 RX ADMIN — HEPARIN SODIUM 0 UNIT(S)/HR: 5000 INJECTION INTRAVENOUS; SUBCUTANEOUS at 20:09

## 2018-08-07 RX ADMIN — Medication 1 DROP(S): at 11:43

## 2018-08-07 RX ADMIN — HEPARIN SODIUM 800 UNIT(S)/HR: 5000 INJECTION INTRAVENOUS; SUBCUTANEOUS at 12:39

## 2018-08-07 RX ADMIN — HEPARIN SODIUM 600 UNIT(S)/HR: 5000 INJECTION INTRAVENOUS; SUBCUTANEOUS at 21:14

## 2018-08-07 RX ADMIN — ATORVASTATIN CALCIUM 20 MILLIGRAM(S): 80 TABLET, FILM COATED ORAL at 21:17

## 2018-08-07 RX ADMIN — CYCLOSPORINE 1 DROP(S): 0.5 EMULSION OPHTHALMIC at 06:10

## 2018-08-07 RX ADMIN — Medication 1 DROP(S): at 21:16

## 2018-08-07 RX ADMIN — HEPARIN SODIUM 0 UNIT(S)/HR: 5000 INJECTION INTRAVENOUS; SUBCUTANEOUS at 11:38

## 2018-08-07 RX ADMIN — LATANOPROST 1 DROP(S): 0.05 SOLUTION/ DROPS OPHTHALMIC; TOPICAL at 21:16

## 2018-08-07 NOTE — PROGRESS NOTE ADULT - SUBJECTIVE AND OBJECTIVE BOX
chief complaint : chest pain        SUBJECTIVE / OVERNIGHT EVENTS: pt denies chest pain, shortness of breath, nausea, v    MEDICATIONS  (STANDING):  amLODIPine   Tablet 10 milliGRAM(s) Oral daily  artificial tears (preservative free) Ophthalmic Solution 1 Drop(s) Left EYE three times a day  aspirin  chewable 81 milliGRAM(s) Oral daily  atorvastatin 20 milliGRAM(s) Oral at bedtime  brimonidine 0.2% Ophthalmic Solution 1 Drop(s) Right EYE two times a day  cycloSPORINE (RESTASIS) 0.05% Emulsion 1 Drop(s) Right EYE two times a day  furosemide    Tablet 20 milliGRAM(s) Oral daily  gabapentin 300 milliGRAM(s) Oral daily  heparin  Infusion.  Unit(s)/Hr (12 mL/Hr) IV Continuous <Continuous>  latanoprost 0.005% Ophthalmic Solution 1 Drop(s) Right EYE at bedtime  pantoprazole    Tablet 40 milliGRAM(s) Oral before breakfast  tamsulosin 0.4 milliGRAM(s) Oral at bedtime  timolol 0.5% Solution 1 Drop(s) Right EYE two times a day    MEDICATIONS  (PRN):  acetaminophen   Tablet. 650 milliGRAM(s) Oral every 6 hours PRN mild and moderate pain  heparin  Injectable 5500 Unit(s) IV Push every 6 hours PRN For aPTT less than 40  heparin  Injectable 2500 Unit(s) IV Push every 6 hours PRN For aPTT between 40 - 57    Vital Signs Last 24 Hrs  T(C): 36.8 (07 Aug 2018 19:16), Max: 36.9 (07 Aug 2018 17:18)  T(F): 98.2 (07 Aug 2018 19:16), Max: 98.4 (07 Aug 2018 17:18)  HR: 71 (07 Aug 2018 19:16) (65 - 79)  BP: 124/78 (07 Aug 2018 19:16) (115/74 - 138/83)  BP(mean): --  RR: 18 (07 Aug 2018 19:16) (17 - 18)  SpO2: 99% (07 Aug 2018 19:16) (98% - 99%)  CAPILLARY BLOOD GLUCOSE        I&O's Summary      Constitutional: No fever, fatigue  Skin: No rash.  Eyes: No recent vision problems or eye pain.  ENT: No congestion, ear pain, or sore throat.  Cardiovascular: No chest pain or palpation.  Respiratory: No cough, shortness of breath, congestion, or wheezing.  Gastrointestinal: No abdominal pain, nausea, vomiting, or diarrhea.  Genitourinary: No dysuria.  Musculoskeletal: No joint swelling.  Neurologic: No headache.    PHYSICAL EXAM:  GENERAL: NAD  EYES: EOMI, PERRLA  NECK: Supple, No JVD  CHEST/LUNG: dec breath sounds at bases   HEART:  S1 , S2 +  ABDOMEN: soft, bs+  EXTREMITIES:  edema+  NEUROLOGY: alert awake oriented     LABS:  08-07    139  |  105  |  21  ----------------------------<  82  4.1   |  19<L>  |  1.67<H>    Ca    8.8      07 Aug 2018 06:18      Creatinine Trend: 1.67 <--, 1.63 <--, 1.70 <--, 1.89 <--                        11.3   4.06  )-----------( 134      ( 07 Aug 2018 06:18 )             33.6     Urine Studies:              PT/INR - ( 06 Aug 2018 17:15 )   PT: 12.5 SEC;   INR: 1.12          PTT - ( 07 Aug 2018 18:33 )  PTT:> 200.0 SEC

## 2018-08-07 NOTE — PROGRESS NOTE ADULT - PROBLEM SELECTOR PLAN 4
f/u cardio/pulm, planning for CTA r/o PE currently on hep gtt.   ok from renal stand point for CTA, renal function stable around baseline  please give mucomyst 1200 bid for 4 doses start 1 day prior to test, last dose after test, sodium bicarb 150meq/L in sterile water at 200cc/hr x 1 hr 1hr prior to test then 75cc/hr x 6 hr after.

## 2018-08-07 NOTE — PROGRESS NOTE ADULT - SUBJECTIVE AND OBJECTIVE BOX
Patient is a 85y old  Male who presents with a chief complaint of chest pain (04 Aug 2018 22:17)      Any change in ROS: pt is doing ok : no SOB : no cough:   remaisn lethargic: doesnot participate in histroy taking:         MEDICATIONS  (STANDING):  amLODIPine   Tablet 10 milliGRAM(s) Oral daily  artificial tears (preservative free) Ophthalmic Solution 1 Drop(s) Left EYE three times a day  aspirin  chewable 81 milliGRAM(s) Oral daily  atorvastatin 20 milliGRAM(s) Oral at bedtime  brimonidine 0.2% Ophthalmic Solution 1 Drop(s) Right EYE two times a day  cycloSPORINE (RESTASIS) 0.05% Emulsion 1 Drop(s) Right EYE two times a day  furosemide    Tablet 20 milliGRAM(s) Oral daily  gabapentin 300 milliGRAM(s) Oral daily  heparin  Infusion.  Unit(s)/Hr (12 mL/Hr) IV Continuous <Continuous>  latanoprost 0.005% Ophthalmic Solution 1 Drop(s) Right EYE at bedtime  pantoprazole    Tablet 40 milliGRAM(s) Oral before breakfast  tamsulosin 0.4 milliGRAM(s) Oral at bedtime  timolol 0.5% Solution 1 Drop(s) Right EYE two times a day    MEDICATIONS  (PRN):  acetaminophen   Tablet. 650 milliGRAM(s) Oral every 6 hours PRN mild and moderate pain  heparin  Injectable 5500 Unit(s) IV Push every 6 hours PRN For aPTT less than 40  heparin  Injectable 2500 Unit(s) IV Push every 6 hours PRN For aPTT between 40 - 57    Vital Signs Last 24 Hrs  T(C): 36.4 (07 Aug 2018 13:43), Max: 36.8 (06 Aug 2018 17:12)  T(F): 97.5 (07 Aug 2018 13:43), Max: 98.2 (06 Aug 2018 17:12)  HR: 79 (07 Aug 2018 13:43) (67 - 79)  BP: 115/74 (07 Aug 2018 13:43) (115/74 - 138/83)  BP(mean): --  RR: 18 (07 Aug 2018 13:43) (17 - 20)  SpO2: 99% (07 Aug 2018 13:43) (98% - 100%)    I&O's Summary        Physical Exam:   GENERAL: NAD, well-groomed, well-developed  HEENT: HARSHIL/   Atraumatic, Normocephalic  ENMT: No tonsillar erythema, exudates, or enlargement; Moist mucous membranes, Good dentition, No lesions  NECK: Supple, No JVD, Normal thyroid  CHEST/LUNG: Clear to auscultaion  CVS: Regular rate and rhythm; No murmurs, rubs, or gallops  GI: : Soft, Nontender, Nondistended; Bowel sounds present  NERVOUS SYSTEM:  lethargic: babbles some words  EXTREMITIES:  2+ Peripheral Pulses, No clubbing, cyanosis, or edema  LYMPH: No lymphadenopathy noted  SKIN: No rashes or lesions  ENDOCRINOLOGY: No Thyromegaly  PSYCH: calm    Labs:                              11.3   4.06  )-----------( 134      ( 07 Aug 2018 06:18 )             33.6                         12.3   4.21  )-----------( 126      ( 07 Aug 2018 00:30 )             35.8                         12.5   4.65  )-----------( 129      ( 06 Aug 2018 07:45 )             37.5                         11.6   4.93  )-----------( 130      ( 05 Aug 2018 06:40 )             34.3                         11.0   3.42  )-----------( 118      ( 04 Aug 2018 14:30 )             33.2     08-07    139  |  105  |  21  ----------------------------<  82  4.1   |  19<L>  |  1.67<H>  08-06    140  |  106  |  20  ----------------------------<  71  4.6   |  20<L>  |  1.63<H>  08-05    142  |  109<H>  |  19  ----------------------------<  75  4.6   |  21<L>  |  1.70<H>  08-04    141  |  109<H>  |  22  ----------------------------<  95  5.0   |  22  |  1.89<H>    Ca    8.8      07 Aug 2018 06:18  Ca    9.3      06 Aug 2018 07:45    TPro  6.8  /  Alb  3.6  /  TBili  0.5  /  DBili  x   /  AST  11  /  ALT  9   /  AlkPhos  68  08-04    CAPILLARY BLOOD GLUCOSE      < from: Xray Chest 1 View AP/PA (08.06.18 @ 13:21) >    EXAM:  XR CHEST AP OR PA 1V        PROCEDURE DATE:  Aug  6 2018         INTERPRETATION:  TIME OF EXAM: August 6, 2018 at 1:15 PM.    CLINICAL INFORMATION: Chest pain. Abnormal transthoracic echocardiogram.   Repeat for VQ scan.    COMPARISON:  August 4, 2018.    TECHNIQUE:   AP Portable chest x-ray. Rotated. Chin obscures left apex.    INTERPRETATION:     Heart size and the mediastinum cannot be accurately evaluated on this   projection.  This a calcified tortuous thoracic aorta.  The visualized lungs are clear.  No pleural effusion or pneumothorax is seen.            IMPRESSION:  Visualized lungs are clear.                  CECILIA GARZA M.D., ATTENDING RADIOLOGIST  This document has been electronically signed. Aug  6 2018  2:45PM    < end of copied text >        PT/INR - ( 06 Aug 2018 17:15 )   PT: 12.5 SEC;   INR: 1.12          PTT - ( 07 Aug 2018 09:42 )  PTT:> 200.0 SEC    D-Dimer Assay, Quantitative: 510 ng/mL (08-07 @ 09:42)        RECENT CULTURES:        RESPIRATORY CULTURES:    < from: NM Pulmonary Ventilation/Perfusion Scan (08.06.18 @ 16:12) >  PROCEDURE DATE:  Aug  6 2018       INTERPRETATION:  RADIOPHARMACEUTICAL: 1.0 mCi Tc-99m-DTPA via inhalation;   6.3 mCi Tc-99m-MAA, I.V.    CLINICAL INFORMATION: 85 year old male with severe pulmonary hypertension   and chest pain; referred to evaluate for pulmonary embolism.    TECHNIQUE:  Ventilation and perfusion images of the lungs were obtained   following administration of Tc-99m-DTPA and Tc-99m-MAA. Images were   obtained in the anterior, posterior, both lateral, and all 4 oblique   projections. The study was interpreted in conjunction with a chest   radiograph of 8/6/2018.    COMPARISON: No previous lung scans were available for comparison.    FINDINGS: There is a large mismatched ventilation/perfusion defect in the   right middle lobe with no corresponding chest x-ray abnormality. There is   heterogeneous distribution of radiopharmaceutical in the remainder of   both lungs on the ventilation and perfusion images.    IMPRESSION: Abnormal ventilation/perfusion lung scan. Intermediate   probability of pulmonary embolus.    ELIF Caballero, was informed of these results by Dr. LAURA Lindo via   telephone with read back at about 4:30 PM on 8/6/2018.                  ISHAN LINDO M.D., CHIEF OF NUCLEAR MEDICINE  This document has been electronically signed. Aug  6 2018  4:27PM        < end of copied text >        Studies  Chest X-RAY  CT SCAN Chest   Venous Dopplers: LE:   CT Abdomen  Others

## 2018-08-07 NOTE — PROGRESS NOTE ADULT - PROBLEM SELECTOR PLAN 1
renal function elevated on admission now improved, etiology? possible prerenal vs ATN  continue to monitor bmp  avoid nephrotoxic agents.

## 2018-08-07 NOTE — PROGRESS NOTE ADULT - SUBJECTIVE AND OBJECTIVE BOX
[facility] [place] [facility]   MD NIC DIANA MD ANGELA WONG, PA    TEL:  OFFICE: [phone]  DR THOMPSON CELL: [phone]  DR. PEDRAZA CELL: [phone]  JULIANO SANTORO CELL: [phone]        Patient is a 85y old  Male who presents with a chief complaint of chest pain (04 Aug 2018 22:17)      Patient seen and examined at bedside. No chest pain/sob, feeling better today     VITALS:  T(F): 98.2 (08-07-18 @ 05:18), Max: 98.2 (08-06-18 @ 17:12)  HR: 67 (08-07-18 @ 05:18)  BP: 138/83 (08-07-18 @ 05:18)  RR: 17 (08-07-18 @ 05:18)  SpO2: 99% (08-07-18 @ 05:18)  Wt(kg): --      Weight (kg): 66 (08-06 @ 18:19)    PHYSICAL EXAM:  Constitutional: NAD  Neck: No JVD  Respiratory: CTAB, no wheezes, rales or rhonchi  Cardiovascular: S1, S2, RRR  Gastrointestinal: BS+, soft, NT/ND  Extremities: No peripheral edema    Hospital Medications:   MEDICATIONS  (STANDING):  amLODIPine   Tablet 10 milliGRAM(s) Oral daily  artificial tears (preservative free) Ophthalmic Solution 1 Drop(s) Left EYE three times a day  aspirin  chewable 81 milliGRAM(s) Oral daily  atorvastatin 20 milliGRAM(s) Oral at bedtime  brimonidine 0.2% Ophthalmic Solution 1 Drop(s) Right EYE two times a day  cycloSPORINE (RESTASIS) 0.05% Emulsion 1 Drop(s) Right EYE two times a day  furosemide    Tablet 20 milliGRAM(s) Oral daily  gabapentin 300 milliGRAM(s) Oral daily  heparin  Infusion.  Unit(s)/Hr (12 mL/Hr) IV Continuous <Continuous>  latanoprost 0.005% Ophthalmic Solution 1 Drop(s) Right EYE at bedtime  pantoprazole    Tablet 40 milliGRAM(s) Oral before breakfast  tamsulosin 0.4 milliGRAM(s) Oral at bedtime  timolol 0.5% Solution 1 Drop(s) Right EYE two times a day      LABS:  08-07    139  |  105  |  21  ----------------------------<  82  4.1   |  19<L>  |  1.67<H>    Ca    8.8      07 Aug 2018 06:18      Creatinine Trend: 1.67 <--, 1.63 <--, 1.70 <--, 1.89 <--      calcium--  intact pth--  parathyroid hormone intact, serum86.67                            11.3   4.06  )-----------( 134      ( 07 Aug 2018 06:18 )             33.6     Urine Studies:      PTH 86.67 (Ca --)      [08-07-18 @ 06:18]   --  HbA1c 5.2      [08-05-18 @ 06:40]  TSH 2.01      [08-05-18 @ 06:40]  Lipid: chol 180, TG 63, HDL 55,       [08-05-18 @ 06:40]        RADIOLOGY & ADDITIONAL STUDIES:

## 2018-08-07 NOTE — PROGRESS NOTE ADULT - PROBLEM SELECTOR PLAN 1
pt has intermediate prob vq scan: Has lasrge mismatched defect: hence was started on heparin: if OK with nephrology : to do CTA: to adequately rule out PE : His chest x-ray has been clear:

## 2018-08-07 NOTE — PROGRESS NOTE ADULT - PROBLEM SELECTOR PLAN 1
pt is chest pain free  cont diuresis with lasix   cont heparin  for pOSS PE - will check d dimers, doppler lower ext , if D dimer elevated consider CTA CHEST

## 2018-08-08 LAB
APTT BLD: 169.2 SEC — CRITICAL HIGH (ref 27.5–37.4)
APTT BLD: 34.6 SEC — SIGNIFICANT CHANGE UP (ref 27.5–37.4)
APTT BLD: 51.2 SEC — HIGH (ref 27.5–37.4)
BUN SERPL-MCNC: 22 MG/DL — SIGNIFICANT CHANGE UP (ref 7–23)
CALCIUM SERPL-MCNC: 8.6 MG/DL — SIGNIFICANT CHANGE UP (ref 8.4–10.5)
CHLORIDE SERPL-SCNC: 106 MMOL/L — SIGNIFICANT CHANGE UP (ref 98–107)
CO2 SERPL-SCNC: 21 MMOL/L — LOW (ref 22–31)
CREAT SERPL-MCNC: 1.74 MG/DL — HIGH (ref 0.5–1.3)
GLUCOSE SERPL-MCNC: 88 MG/DL — SIGNIFICANT CHANGE UP (ref 70–99)
HCT VFR BLD CALC: 34.4 % — LOW (ref 39–50)
HGB BLD-MCNC: 11.9 G/DL — LOW (ref 13–17)
MCHC RBC-ENTMCNC: 28.6 PG — SIGNIFICANT CHANGE UP (ref 27–34)
MCHC RBC-ENTMCNC: 34.6 % — SIGNIFICANT CHANGE UP (ref 32–36)
MCV RBC AUTO: 82.7 FL — SIGNIFICANT CHANGE UP (ref 80–100)
NRBC # FLD: 0 — SIGNIFICANT CHANGE UP
PLATELET # BLD AUTO: 136 K/UL — LOW (ref 150–400)
PMV BLD: 11.7 FL — SIGNIFICANT CHANGE UP (ref 7–13)
POTASSIUM SERPL-MCNC: 4.1 MMOL/L — SIGNIFICANT CHANGE UP (ref 3.5–5.3)
POTASSIUM SERPL-SCNC: 4.1 MMOL/L — SIGNIFICANT CHANGE UP (ref 3.5–5.3)
RBC # BLD: 4.16 M/UL — LOW (ref 4.2–5.8)
RBC # FLD: 14.6 % — HIGH (ref 10.3–14.5)
SODIUM SERPL-SCNC: 140 MMOL/L — SIGNIFICANT CHANGE UP (ref 135–145)
WBC # BLD: 3.85 K/UL — SIGNIFICANT CHANGE UP (ref 3.8–10.5)
WBC # FLD AUTO: 3.85 K/UL — SIGNIFICANT CHANGE UP (ref 3.8–10.5)

## 2018-08-08 PROCEDURE — 71275 CT ANGIOGRAPHY CHEST: CPT | Mod: 26

## 2018-08-08 RX ORDER — ACETYLCYSTEINE 200 MG/ML
1200 VIAL (ML) MISCELLANEOUS
Qty: 0 | Refills: 0 | Status: COMPLETED | OUTPATIENT
Start: 2018-08-08 | End: 2018-08-10

## 2018-08-08 RX ADMIN — HEPARIN SODIUM 0 UNIT(S)/HR: 5000 INJECTION INTRAVENOUS; SUBCUTANEOUS at 06:32

## 2018-08-08 RX ADMIN — LATANOPROST 1 DROP(S): 0.05 SOLUTION/ DROPS OPHTHALMIC; TOPICAL at 21:22

## 2018-08-08 RX ADMIN — TAMSULOSIN HYDROCHLORIDE 0.4 MILLIGRAM(S): 0.4 CAPSULE ORAL at 21:21

## 2018-08-08 RX ADMIN — BRIMONIDINE TARTRATE 1 DROP(S): 2 SOLUTION/ DROPS OPHTHALMIC at 05:46

## 2018-08-08 RX ADMIN — Medication 1 DROP(S): at 05:47

## 2018-08-08 RX ADMIN — Medication 1 DROP(S): at 17:44

## 2018-08-08 RX ADMIN — PANTOPRAZOLE SODIUM 40 MILLIGRAM(S): 20 TABLET, DELAYED RELEASE ORAL at 05:50

## 2018-08-08 RX ADMIN — Medication 1200 MILLIGRAM(S): at 21:19

## 2018-08-08 RX ADMIN — AMLODIPINE BESYLATE 10 MILLIGRAM(S): 2.5 TABLET ORAL at 05:50

## 2018-08-08 RX ADMIN — CYCLOSPORINE 1 DROP(S): 0.5 EMULSION OPHTHALMIC at 07:05

## 2018-08-08 RX ADMIN — Medication 20 MILLIGRAM(S): at 06:02

## 2018-08-08 RX ADMIN — HEPARIN SODIUM 500 UNIT(S)/HR: 5000 INJECTION INTRAVENOUS; SUBCUTANEOUS at 14:33

## 2018-08-08 RX ADMIN — BRIMONIDINE TARTRATE 1 DROP(S): 2 SOLUTION/ DROPS OPHTHALMIC at 17:44

## 2018-08-08 RX ADMIN — Medication 81 MILLIGRAM(S): at 12:10

## 2018-08-08 RX ADMIN — ATORVASTATIN CALCIUM 20 MILLIGRAM(S): 80 TABLET, FILM COATED ORAL at 21:21

## 2018-08-08 RX ADMIN — CYCLOSPORINE 1 DROP(S): 0.5 EMULSION OPHTHALMIC at 17:51

## 2018-08-08 RX ADMIN — HEPARIN SODIUM 400 UNIT(S)/HR: 5000 INJECTION INTRAVENOUS; SUBCUTANEOUS at 07:35

## 2018-08-08 RX ADMIN — GABAPENTIN 300 MILLIGRAM(S): 400 CAPSULE ORAL at 12:10

## 2018-08-08 RX ADMIN — Medication 1 DROP(S): at 12:11

## 2018-08-08 RX ADMIN — Medication 1 DROP(S): at 21:21

## 2018-08-08 RX ADMIN — Medication 1 DROP(S): at 05:49

## 2018-08-08 NOTE — PROVIDER CONTACT NOTE (CRITICAL VALUE NOTIFICATION) - RECOMMENDATIONS
Follow heparin nomogram, hold infusion for 60 mins then restart at 6 ml/hr (decrease dose by 200, rate by 2).

## 2018-08-08 NOTE — PROGRESS NOTE ADULT - SUBJECTIVE AND OBJECTIVE BOX
Patient is a 85y old  Male who presents with a chief complaint of chest pain (04 Aug 2018 22:17)      Any change in ROS: doing same    MEDICATIONS  (STANDING):  acetylcysteine  Oral Solution 1200 milliGRAM(s) Oral two times a day  amLODIPine   Tablet 10 milliGRAM(s) Oral daily  artificial tears (preservative free) Ophthalmic Solution 1 Drop(s) Left EYE three times a day  aspirin  chewable 81 milliGRAM(s) Oral daily  atorvastatin 20 milliGRAM(s) Oral at bedtime  brimonidine 0.2% Ophthalmic Solution 1 Drop(s) Right EYE two times a day  cycloSPORINE (RESTASIS) 0.05% Emulsion 1 Drop(s) Right EYE two times a day  furosemide    Tablet 20 milliGRAM(s) Oral daily  gabapentin 300 milliGRAM(s) Oral daily  heparin  Infusion.  Unit(s)/Hr (12 mL/Hr) IV Continuous <Continuous>  latanoprost 0.005% Ophthalmic Solution 1 Drop(s) Right EYE at bedtime  pantoprazole    Tablet 40 milliGRAM(s) Oral before breakfast  tamsulosin 0.4 milliGRAM(s) Oral at bedtime  timolol 0.5% Solution 1 Drop(s) Right EYE two times a day    MEDICATIONS  (PRN):  acetaminophen   Tablet. 650 milliGRAM(s) Oral every 6 hours PRN mild and moderate pain  heparin  Injectable 5500 Unit(s) IV Push every 6 hours PRN For aPTT less than 40  heparin  Injectable 2500 Unit(s) IV Push every 6 hours PRN For aPTT between 40 - 57    Vital Signs Last 24 Hrs  T(C): 36.8 (08 Aug 2018 13:05), Max: 36.9 (07 Aug 2018 17:18)  T(F): 98.3 (08 Aug 2018 13:05), Max: 98.4 (07 Aug 2018 17:18)  HR: 74 (08 Aug 2018 13:05) (65 - 79)  BP: 98/70 (08 Aug 2018 13:05) (98/70 - 125/79)  BP(mean): --  RR: 18 (08 Aug 2018 13:05) (17 - 18)  SpO2: 100% (08 Aug 2018 13:05) (98% - 100%)    I&O's Summary        Physical Exam:   GENERAL: NAD, well-groomed, well-developed  HEENT: HARSHIL/   Atraumatic, Normocephalic  ENMT: No tonsillar erythema, exudates, or enlargement; Moist mucous membranes, Good dentition, No lesions  NECK: Supple, No JVD, Normal thyroid  CHEST/LUNG: Clear to auscultaion  CVS: Regular rate and rhythm; No murmurs, rubs, or gallops  GI: : Soft, Nontender, Nondistended; Bowel sounds present  NERVOUS SYSTEM:  tries to respond: but most of the time is sleepy, oriented x 0-1  EXTREMITIES:  2+ Peripheral Pulses, No clubbing, cyanosis, or edema  LYMPH: No lymphadenopathy noted  SKIN: No rashes or lesions  ENDOCRINOLOGY: No Thyromegaly  PSYCH: Demntia    Labs:                              11.9   3.85  )-----------( 136      ( 08 Aug 2018 05:20 )             34.4                         11.3   4.06  )-----------( 134      ( 07 Aug 2018 06:18 )             33.6                         12.3   4.21  )-----------( 126      ( 07 Aug 2018 00:30 )             35.8                         12.5   4.65  )-----------( 129      ( 06 Aug 2018 07:45 )             37.5                         11.6   4.93  )-----------( 130      ( 05 Aug 2018 06:40 )             34.3                         11.0   3.42  )-----------( 118      ( 04 Aug 2018 14:30 )             33.2     08-08    140  |  106  |  22  ----------------------------<  88  4.1   |  21<L>  |  1.74<H>  08-07    139  |  105  |  21  ----------------------------<  82  4.1   |  19<L>  |  1.67<H>  08-06    140  |  106  |  20  ----------------------------<  71  4.6   |  20<L>  |  1.63<H>  08-05    142  |  109<H>  |  19  ----------------------------<  75  4.6   |  21<L>  |  1.70<H>  08-04    141  |  109<H>  |  22  ----------------------------<  95  5.0   |  22  |  1.89<H>    Ca    8.6      08 Aug 2018 05:20  Ca    8.8      07 Aug 2018 06:18    TPro  6.8  /  Alb  3.6  /  TBili  0.5  /  DBili  x   /  AST  11  /  ALT  9   /  AlkPhos  68  08-04    CAPILLARY BLOOD GLUCOSE            PT/INR - ( 06 Aug 2018 17:15 )   PT: 12.5 SEC;   INR: 1.12          PTT - ( 08 Aug 2018 05:20 )  PTT:169.2 SEC    D-Dimer Assay, Quantitative: 363 ng/mL (08-07 @ 18:33)  D-Dimer Assay, Quantitative: 510 ng/mL (08-07 @ 09:42)        RECENT CULTURES:        RESPIRATORY CULTURES:      < from: NM Pulmonary Ventilation/Perfusion Scan (08.06.18 @ 16:12) >    EXAM:  NM PULM VENTILATION PERFUS IMG        PROCEDURE DATE:  Aug  6 2018       INTERPRETATION:  RADIOPHARMACEUTICAL: 1.0 mCi Tc-99m-DTPA via inhalation;   6.3 mCi Tc-99m-MAA, I.V.    CLINICAL INFORMATION: 85 year old male with severe pulmonary hypertension   and chest pain; referred to evaluate for pulmonary embolism.    TECHNIQUE:  Ventilation and perfusion images of the lungs were obtained   following administration of Tc-99m-DTPA and Tc-99m-MAA. Images were   obtained in the anterior, posterior, both lateral, and all 4 oblique   projections. The study was interpreted in conjunction with a chest   radiograph of 8/6/2018.    COMPARISON: No previous lung scans were available for comparison.    FINDINGS: There is a large mismatched ventilation/perfusion defect in the   right middle lobe with no corresponding chest x-ray abnormality. There is   heterogeneous distribution of radiopharmaceutical in the remainder of   both lungs on the ventilation and perfusion images.    IMPRESSION: Abnormal ventilation/perfusion lung scan. Intermediate   probability of pulmonary embolus.    ELIF Caballero, was informed of these results by Dr. LAURA Lindo via   telephone with read back at about 4:30 PM on 8/6/2018.              < from: Xray Chest 1 View AP/PA (08.06.18 @ 13:21) >    EXAM:  XR CHEST AP OR PA 1V        PROCEDURE DATE:  Aug  6 2018         INTERPRETATION:  TIME OF EXAM: August 6, 2018 at 1:15 PM.    CLINICAL INFORMATION: Chest pain. Abnormal transthoracic echocardiogram.   Repeat for VQ scan.    COMPARISON:  August 4, 2018.    TECHNIQUE:   AP Portable chest x-ray. Rotated. Chin obscures left apex.    INTERPRETATION:     Heart size and the mediastinum cannot be accurately evaluated on this   projection.  This a calcified tortuous thoracic aorta.  The visualized lungs are clear.  No pleural effusion or pneumothorax is seen.            IMPRESSION:  Visualized lungs are clear.                  CECILIA GARZA M.D., ATTENDING RADIOLOGIST  This document has been electronically signed. Aug  6 2018  2:45PM    < end of copied text >      ISHAN LINDO M.D., CHIEF OF NUCLEAR MEDICINE  This document has been electronically signed. Aug  6 2018  4:27PM              < end of copied text >      Studies  Chest X-RAY  CT SCAN Chest   Venous Dopplers: LE:   CT Abdomen  Others

## 2018-08-08 NOTE — PROGRESS NOTE ADULT - PROBLEM SELECTOR PLAN 1
pt has intermediate prob vq scan: Has lasrge mismatched defect: hence was started on heparin: if OK with nephrology : to do CTA: to adequately rule out PE : His chest x-ray has been clear:  8/8: cta is pending: has been ordered: tolerating heparin at this time: wait for cta results

## 2018-08-08 NOTE — PROGRESS NOTE ADULT - PROBLEM SELECTOR PLAN 1
renal function elevated on admission now improved, etiology? possible prerenal vs ATN  continue to monitor bmp  avoid nephrotoxic agents. Nadia likely prerenal vs ATN  renal function relatively stable   continue to monitor bmp  avoid nephrotoxic agents.

## 2018-08-08 NOTE — PROGRESS NOTE ADULT - PROBLEM SELECTOR PLAN 1
pt is chest pain free  cont diuresis with lasix   cta chest today - if neg for [pe dc heparin drip   PT and poss dc

## 2018-08-08 NOTE — PHYSICAL THERAPY INITIAL EVALUATION ADULT - PASSIVE RANGE OF MOTION EXAMINATION, REHAB EVAL
except increased tone to left upper extremity and bilateral lower extremity adductor tone/no Passive ROM deficits were identified

## 2018-08-08 NOTE — PROGRESS NOTE ADULT - SUBJECTIVE AND OBJECTIVE BOX
[facility] [place]   MD NIC DIANA MD ANGELA WONG, PA    TEL:  OFFICE: [phone]  DR THOMPSON CELL: [phone]  DR. PEDRAZA CELL: [phone]  JULIANO SANTORO CELL: [phone]        Patient is a 85y old  Male who presents with a chief complaint of chest pain (04 Aug 2018 22:17)      Patient seen and examined at bedside. No chest pain/sob    VITALS:  T(F): 98.4 (08-08-18 @ 05:45), Max: 98.4 (08-07-18 @ 17:18)  HR: 78 (08-08-18 @ 05:45)  BP: 125/79 (08-08-18 @ 05:45)  RR: 17 (08-08-18 @ 05:45)  SpO2: 99% (08-08-18 @ 05:45)  Wt(kg): --        PHYSICAL EXAM:  Constitutional: NAD  Neck: No JVD  Respiratory: CTAB, no wheezes, rales or rhonchi  Cardiovascular: S1, S2, RRR  Gastrointestinal: BS+, soft, NT/ND  Extremities: No peripheral edema    Hospital Medications:   MEDICATIONS  (STANDING):  amLODIPine   Tablet 10 milliGRAM(s) Oral daily  artificial tears (preservative free) Ophthalmic Solution 1 Drop(s) Left EYE three times a day  aspirin  chewable 81 milliGRAM(s) Oral daily  atorvastatin 20 milliGRAM(s) Oral at bedtime  brimonidine 0.2% Ophthalmic Solution 1 Drop(s) Right EYE two times a day  cycloSPORINE (RESTASIS) 0.05% Emulsion 1 Drop(s) Right EYE two times a day  furosemide    Tablet 20 milliGRAM(s) Oral daily  gabapentin 300 milliGRAM(s) Oral daily  heparin  Infusion.  Unit(s)/Hr (12 mL/Hr) IV Continuous <Continuous>  latanoprost 0.005% Ophthalmic Solution 1 Drop(s) Right EYE at bedtime  pantoprazole    Tablet 40 milliGRAM(s) Oral before breakfast  tamsulosin 0.4 milliGRAM(s) Oral at bedtime  timolol 0.5% Solution 1 Drop(s) Right EYE two times a day      LABS:  08-08    140  |  106  |  22  ----------------------------<  88  4.1   |  21<L>  |  1.74<H>    Ca    8.6      08 Aug 2018 05:20      Creatinine Trend: 1.74 <--, 1.67 <--, 1.63 <--, 1.70 <--, 1.89 <--                                11.9   3.85  )-----------( 136      ( 08 Aug 2018 05:20 )             34.4     Urine Studies:      PTH 86.67 (Ca --)      [08-07-18 @ 06:18]   --  HbA1c 5.2      [08-05-18 @ 06:40]  TSH 2.01      [08-05-18 @ 06:40]  Lipid: chol 180, TG 63, HDL 55,       [08-05-18 @ 06:40]        RADIOLOGY & ADDITIONAL STUDIES:

## 2018-08-08 NOTE — PROGRESS NOTE ADULT - SUBJECTIVE AND OBJECTIVE BOX
chief complaint : chest pain        SUBJECTIVE / OVERNIGHT EVENTS: pt denies chest pain, shortness of breath, nausea, v    MEDICATIONS  (STANDING):  acetylcysteine  Oral Solution 1200 milliGRAM(s) Oral two times a day  amLODIPine   Tablet 10 milliGRAM(s) Oral daily  artificial tears (preservative free) Ophthalmic Solution 1 Drop(s) Left EYE three times a day  aspirin  chewable 81 milliGRAM(s) Oral daily  atorvastatin 20 milliGRAM(s) Oral at bedtime  brimonidine 0.2% Ophthalmic Solution 1 Drop(s) Right EYE two times a day  cycloSPORINE (RESTASIS) 0.05% Emulsion 1 Drop(s) Right EYE two times a day  furosemide    Tablet 20 milliGRAM(s) Oral daily  gabapentin 300 milliGRAM(s) Oral daily  latanoprost 0.005% Ophthalmic Solution 1 Drop(s) Right EYE at bedtime  pantoprazole    Tablet 40 milliGRAM(s) Oral before breakfast  tamsulosin 0.4 milliGRAM(s) Oral at bedtime  timolol 0.5% Solution 1 Drop(s) Right EYE two times a day    MEDICATIONS  (PRN):  acetaminophen   Tablet. 650 milliGRAM(s) Oral every 6 hours PRN mild and moderate pain    Vital Signs Last 24 Hrs  T(C): 36.4 (08 Aug 2018 19:16), Max: 36.9 (08 Aug 2018 05:45)  T(F): 97.6 (08 Aug 2018 19:16), Max: 98.4 (08 Aug 2018 05:45)  HR: 82 (08 Aug 2018 19:16) (74 - 82)  BP: 120/60 (08 Aug 2018 19:16) (98/70 - 125/79)  BP(mean): --  RR: 18 (08 Aug 2018 19:16) (17 - 18)  SpO2: 99% (08 Aug 2018 19:16) (99% - 100%)    Constitutional: No fever, fatigue  Skin: No rash.  Eyes: No recent vision problems or eye pain.  ENT: No congestion, ear pain, or sore throat.  Cardiovascular: No chest pain or palpation.  Respiratory: No cough, shortness of breath, congestion, or wheezing.  Gastrointestinal: No abdominal pain, nausea, vomiting, or diarrhea.  Genitourinary: No dysuria.  Musculoskeletal: No joint swelling.  Neurologic: No headache.    PHYSICAL EXAM:  GENERAL: NAD  EYES: EOMI, PERRLA  NECK: Supple, No JVD  CHEST/LUNG: dec breath sounds at bases   HEART:  S1 , S2 +  ABDOMEN: soft, bs+  EXTREMITIES:  edema+  NEUROLOGY: alert awake oriented     LABS:  08-08    140  |  106  |  22  ----------------------------<  88  4.1   |  21<L>  |  1.74<H>    Ca    8.6      08 Aug 2018 05:20      Creatinine Trend: 1.74 <--, 1.67 <--, 1.63 <--, 1.70 <--, 1.89 <--                        11.9   3.85  )-----------( 136      ( 08 Aug 2018 05:20 )             34.4     Urine Studies:              PTT - ( 08 Aug 2018 13:16 )  PTT:51.2 SEC

## 2018-08-08 NOTE — PROVIDER CONTACT NOTE (CRITICAL VALUE NOTIFICATION) - SITUATION
Pt's aPTT was not drawn at scheduled time of 0244, instead drawn at 0445 due to a miscommunication, resulted critically at 169.2.

## 2018-08-08 NOTE — PROVIDER CONTACT NOTE (OTHER) - ASSESSMENT
Pt calm, sleeping but arousable, no s/s of distress, AOx1
Pt asymptomatic; calm/cooperative
Pt calm, sleeping but arousable, no s/s of distress
Pt slightly agitated, in bed, no s/s of distress.
pt asymptomatic at this time

## 2018-08-08 NOTE — PROVIDER CONTACT NOTE (OTHER) - ACTION/TREATMENT ORDERED:
Continue to monitor. Amlodipine 10 mg ordered and given
Continue to monitor per Tele PA orders
Will continue to monitor as per orders
Will give amlodipine as ordered
as per nomogram patient is to receive bolus of 2,500 heparin and increase by 1, as per ADS [place] holding off on heparin bolus at this time as she does not want to push patient over therapeutic range.

## 2018-08-08 NOTE — PROGRESS NOTE ADULT - PROBLEM SELECTOR PLAN 2
baseline likely 1.5-1.6, renal function slightly worsen today  elevated PTH, check vit d 25  phos level optimal. baseline likely 1.5-1.6,   Scr mildly elevated today   elevated PTH, check vit d 25  phos level optimal.  please give mucomyst 1200 bid for 4 doses start 1 day prior to CTA , last dose after test, sodium bicarb 150meq/L in sterile water at 200cc/hr x 1 hr 1hr prior to test then 75cc/hr x 6 hr after.

## 2018-08-09 LAB
24R-OH-CALCIDIOL SERPL-MCNC: 12.7 NG/ML — LOW (ref 30–80)
BASOPHILS # BLD AUTO: 0.02 K/UL — SIGNIFICANT CHANGE UP (ref 0–0.2)
BASOPHILS NFR BLD AUTO: 0.5 % — SIGNIFICANT CHANGE UP (ref 0–2)
BUN SERPL-MCNC: 22 MG/DL — SIGNIFICANT CHANGE UP (ref 7–23)
CALCIUM SERPL-MCNC: 8.9 MG/DL — SIGNIFICANT CHANGE UP (ref 8.4–10.5)
CHLORIDE SERPL-SCNC: 105 MMOL/L — SIGNIFICANT CHANGE UP (ref 98–107)
CO2 SERPL-SCNC: 21 MMOL/L — LOW (ref 22–31)
CREAT ?TM UR-MCNC: 119.6 MG/DL — SIGNIFICANT CHANGE UP
CREAT SERPL-MCNC: 1.81 MG/DL — HIGH (ref 0.5–1.3)
EOSINOPHIL # BLD AUTO: 0.22 K/UL — SIGNIFICANT CHANGE UP (ref 0–0.5)
EOSINOPHIL NFR BLD AUTO: 5.6 % — SIGNIFICANT CHANGE UP (ref 0–6)
GLUCOSE SERPL-MCNC: 76 MG/DL — SIGNIFICANT CHANGE UP (ref 70–99)
HCT VFR BLD CALC: 34.6 % — LOW (ref 39–50)
HCT VFR BLD CALC: 34.6 % — LOW (ref 39–50)
HGB BLD-MCNC: 11.8 G/DL — LOW (ref 13–17)
HGB BLD-MCNC: 11.8 G/DL — LOW (ref 13–17)
IMM GRANULOCYTES # BLD AUTO: 0.01 # — SIGNIFICANT CHANGE UP
IMM GRANULOCYTES NFR BLD AUTO: 0.3 % — SIGNIFICANT CHANGE UP (ref 0–1.5)
LYMPHOCYTES # BLD AUTO: 1.1 K/UL — SIGNIFICANT CHANGE UP (ref 1–3.3)
LYMPHOCYTES # BLD AUTO: 28 % — SIGNIFICANT CHANGE UP (ref 13–44)
MCHC RBC-ENTMCNC: 27.8 PG — SIGNIFICANT CHANGE UP (ref 27–34)
MCHC RBC-ENTMCNC: 27.8 PG — SIGNIFICANT CHANGE UP (ref 27–34)
MCHC RBC-ENTMCNC: 34.1 % — SIGNIFICANT CHANGE UP (ref 32–36)
MCHC RBC-ENTMCNC: 34.1 % — SIGNIFICANT CHANGE UP (ref 32–36)
MCV RBC AUTO: 81.6 FL — SIGNIFICANT CHANGE UP (ref 80–100)
MCV RBC AUTO: 81.6 FL — SIGNIFICANT CHANGE UP (ref 80–100)
MONOCYTES # BLD AUTO: 0.53 K/UL — SIGNIFICANT CHANGE UP (ref 0–0.9)
MONOCYTES NFR BLD AUTO: 13.5 % — SIGNIFICANT CHANGE UP (ref 2–14)
NEUTROPHILS # BLD AUTO: 2.05 K/UL — SIGNIFICANT CHANGE UP (ref 1.8–7.4)
NEUTROPHILS NFR BLD AUTO: 52.1 % — SIGNIFICANT CHANGE UP (ref 43–77)
NRBC # FLD: 0 — SIGNIFICANT CHANGE UP
NRBC # FLD: 0 — SIGNIFICANT CHANGE UP
PLATELET # BLD AUTO: 140 K/UL — LOW (ref 150–400)
PLATELET # BLD AUTO: 140 K/UL — LOW (ref 150–400)
PMV BLD: 11 FL — SIGNIFICANT CHANGE UP (ref 7–13)
PMV BLD: 11 FL — SIGNIFICANT CHANGE UP (ref 7–13)
POTASSIUM SERPL-MCNC: 4.1 MMOL/L — SIGNIFICANT CHANGE UP (ref 3.5–5.3)
POTASSIUM SERPL-SCNC: 4.1 MMOL/L — SIGNIFICANT CHANGE UP (ref 3.5–5.3)
PROT UR-MCNC: 14.5 MG/DL — SIGNIFICANT CHANGE UP
RBC # BLD: 4.24 M/UL — SIGNIFICANT CHANGE UP (ref 4.2–5.8)
RBC # BLD: 4.24 M/UL — SIGNIFICANT CHANGE UP (ref 4.2–5.8)
RBC # FLD: 14.6 % — HIGH (ref 10.3–14.5)
RBC # FLD: 14.6 % — HIGH (ref 10.3–14.5)
SODIUM SERPL-SCNC: 139 MMOL/L — SIGNIFICANT CHANGE UP (ref 135–145)
WBC # BLD: 3.93 K/UL — SIGNIFICANT CHANGE UP (ref 3.8–10.5)
WBC # BLD: 3.93 K/UL — SIGNIFICANT CHANGE UP (ref 3.8–10.5)
WBC # FLD AUTO: 3.93 K/UL — SIGNIFICANT CHANGE UP (ref 3.8–10.5)
WBC # FLD AUTO: 3.93 K/UL — SIGNIFICANT CHANGE UP (ref 3.8–10.5)

## 2018-08-09 RX ORDER — FUROSEMIDE 40 MG
20 TABLET ORAL DAILY
Qty: 0 | Refills: 0 | Status: DISCONTINUED | OUTPATIENT
Start: 2018-08-09 | End: 2018-08-10

## 2018-08-09 RX ORDER — AMLODIPINE BESYLATE 2.5 MG/1
5 TABLET ORAL DAILY
Qty: 0 | Refills: 0 | Status: DISCONTINUED | OUTPATIENT
Start: 2018-08-10 | End: 2018-08-10

## 2018-08-09 RX ORDER — HEPARIN SODIUM 5000 [USP'U]/ML
5000 INJECTION INTRAVENOUS; SUBCUTANEOUS EVERY 8 HOURS
Qty: 0 | Refills: 0 | Status: DISCONTINUED | OUTPATIENT
Start: 2018-08-09 | End: 2018-08-10

## 2018-08-09 RX ORDER — SODIUM BICARBONATE 1 MEQ/ML
650 SYRINGE (ML) INTRAVENOUS THREE TIMES A DAY
Qty: 0 | Refills: 0 | Status: DISCONTINUED | OUTPATIENT
Start: 2018-08-09 | End: 2018-08-10

## 2018-08-09 RX ORDER — AMLODIPINE BESYLATE 2.5 MG/1
10 TABLET ORAL DAILY
Qty: 0 | Refills: 0 | Status: DISCONTINUED | OUTPATIENT
Start: 2018-08-09 | End: 2018-08-09

## 2018-08-09 RX ORDER — SODIUM CHLORIDE 9 MG/ML
1000 INJECTION INTRAMUSCULAR; INTRAVENOUS; SUBCUTANEOUS
Qty: 0 | Refills: 0 | Status: DISCONTINUED | OUTPATIENT
Start: 2018-08-09 | End: 2018-08-10

## 2018-08-09 RX ORDER — SODIUM CHLORIDE 9 MG/ML
1000 INJECTION INTRAMUSCULAR; INTRAVENOUS; SUBCUTANEOUS ONCE
Qty: 0 | Refills: 0 | Status: COMPLETED | OUTPATIENT
Start: 2018-08-09 | End: 2018-08-09

## 2018-08-09 RX ADMIN — Medication 20 MILLIGRAM(S): at 06:22

## 2018-08-09 RX ADMIN — BRIMONIDINE TARTRATE 1 DROP(S): 2 SOLUTION/ DROPS OPHTHALMIC at 06:23

## 2018-08-09 RX ADMIN — HEPARIN SODIUM 5000 UNIT(S): 5000 INJECTION INTRAVENOUS; SUBCUTANEOUS at 12:36

## 2018-08-09 RX ADMIN — SODIUM CHLORIDE 500 MILLILITER(S): 9 INJECTION INTRAMUSCULAR; INTRAVENOUS; SUBCUTANEOUS at 13:16

## 2018-08-09 RX ADMIN — Medication 1200 MILLIGRAM(S): at 19:15

## 2018-08-09 RX ADMIN — Medication 650 MILLIGRAM(S): at 21:49

## 2018-08-09 RX ADMIN — Medication 81 MILLIGRAM(S): at 12:34

## 2018-08-09 RX ADMIN — AMLODIPINE BESYLATE 10 MILLIGRAM(S): 2.5 TABLET ORAL at 06:22

## 2018-08-09 RX ADMIN — Medication 1 DROP(S): at 12:35

## 2018-08-09 RX ADMIN — PANTOPRAZOLE SODIUM 40 MILLIGRAM(S): 20 TABLET, DELAYED RELEASE ORAL at 06:22

## 2018-08-09 RX ADMIN — LATANOPROST 1 DROP(S): 0.05 SOLUTION/ DROPS OPHTHALMIC; TOPICAL at 21:49

## 2018-08-09 RX ADMIN — CYCLOSPORINE 1 DROP(S): 0.5 EMULSION OPHTHALMIC at 17:25

## 2018-08-09 RX ADMIN — Medication 1200 MILLIGRAM(S): at 06:22

## 2018-08-09 RX ADMIN — SODIUM CHLORIDE 60 MILLILITER(S): 9 INJECTION INTRAMUSCULAR; INTRAVENOUS; SUBCUTANEOUS at 17:45

## 2018-08-09 RX ADMIN — HEPARIN SODIUM 5000 UNIT(S): 5000 INJECTION INTRAVENOUS; SUBCUTANEOUS at 21:50

## 2018-08-09 RX ADMIN — BRIMONIDINE TARTRATE 1 DROP(S): 2 SOLUTION/ DROPS OPHTHALMIC at 17:25

## 2018-08-09 RX ADMIN — GABAPENTIN 300 MILLIGRAM(S): 400 CAPSULE ORAL at 12:34

## 2018-08-09 RX ADMIN — Medication 1 DROP(S): at 06:23

## 2018-08-09 RX ADMIN — Medication 1 DROP(S): at 21:49

## 2018-08-09 RX ADMIN — ATORVASTATIN CALCIUM 20 MILLIGRAM(S): 80 TABLET, FILM COATED ORAL at 21:49

## 2018-08-09 RX ADMIN — TAMSULOSIN HYDROCHLORIDE 0.4 MILLIGRAM(S): 0.4 CAPSULE ORAL at 21:50

## 2018-08-09 RX ADMIN — Medication 1 DROP(S): at 17:26

## 2018-08-09 RX ADMIN — CYCLOSPORINE 1 DROP(S): 0.5 EMULSION OPHTHALMIC at 07:35

## 2018-08-09 RX ADMIN — SODIUM CHLORIDE 60 MILLILITER(S): 9 INJECTION INTRAMUSCULAR; INTRAVENOUS; SUBCUTANEOUS at 21:48

## 2018-08-09 RX ADMIN — Medication 1 DROP(S): at 06:22

## 2018-08-09 NOTE — PROGRESS NOTE ADULT - SUBJECTIVE AND OBJECTIVE BOX
Patient is a 85y old  Male who presents with a chief complaint of chest pain (04 Aug 2018 22:17)      Any change in ROS:   Looks much better:  no SOB :     says he is doing well   MEDICATIONS  (STANDING):  acetylcysteine  Oral Solution 1200 milliGRAM(s) Oral two times a day  amLODIPine   Tablet 10 milliGRAM(s) Oral daily  artificial tears (preservative free) Ophthalmic Solution 1 Drop(s) Left EYE three times a day  aspirin  chewable 81 milliGRAM(s) Oral daily  atorvastatin 20 milliGRAM(s) Oral at bedtime  brimonidine 0.2% Ophthalmic Solution 1 Drop(s) Right EYE two times a day  cycloSPORINE (RESTASIS) 0.05% Emulsion 1 Drop(s) Right EYE two times a day  furosemide    Tablet 20 milliGRAM(s) Oral daily  gabapentin 300 milliGRAM(s) Oral daily  heparin  Injectable 5000 Unit(s) SubCutaneous every 8 hours  latanoprost 0.005% Ophthalmic Solution 1 Drop(s) Right EYE at bedtime  pantoprazole    Tablet 40 milliGRAM(s) Oral before breakfast  tamsulosin 0.4 milliGRAM(s) Oral at bedtime  timolol 0.5% Solution 1 Drop(s) Right EYE two times a day    MEDICATIONS  (PRN):  acetaminophen   Tablet. 650 milliGRAM(s) Oral every 6 hours PRN mild and moderate pain    Vital Signs Last 24 Hrs  T(C): 36.8 (09 Aug 2018 06:10), Max: 36.8 (08 Aug 2018 13:05)  T(F): 98.2 (09 Aug 2018 06:10), Max: 98.3 (08 Aug 2018 13:05)  HR: 66 (09 Aug 2018 06:10) (66 - 82)  BP: 112/75 (09 Aug 2018 06:10) (98/70 - 120/60)  BP(mean): --  RR: 18 (09 Aug 2018 06:10) (18 - 18)  SpO2: 100% (09 Aug 2018 06:10) (99% - 100%)    I&O's Summary        Physical Exam:   GENERAL: NAD, well-groomed, well-developed  HEENT: HARSHIL/   Atraumatic, Normocephalic  ENMT: No tonsillar erythema, exudates, or enlargement; Moist mucous membranes, Good dentition, No lesions  NECK: Supple, No JVD, Normal thyroid  CHEST/LUNG: Clear to auscultaion  CVS: Regular rate and rhythm; No murmurs, rubs, or gallops  GI: : Soft, Nontender, Nondistended; Bowel sounds present  NERVOUS SYSTEM:  Alert & Awke  EXTREMITIES:  -edema  LYMPH: No lymphadenopathy noted  SKIN: No rashes or lesions  ENDOCRINOLOGY: No Thyromegaly  PSYCH: calm    Labs:                              11.8   3.93  )-----------( 140      ( 09 Aug 2018 05:30 )             34.6                         11.9   3.85  )-----------( 136      ( 08 Aug 2018 05:20 )             34.4                         11.3   4.06  )-----------( 134      ( 07 Aug 2018 06:18 )             33.6                         12.3   4.21  )-----------( 126      ( 07 Aug 2018 00:30 )             35.8                         12.5   4.65  )-----------( 129      ( 06 Aug 2018 07:45 )             37.5     08-09    139  |  105  |  22  ----------------------------<  76  4.1   |  21<L>  |  1.81<H>  08-08    140  |  106  |  22  ----------------------------<  88  4.1   |  21<L>  |  1.74<H>  08-07    139  |  105  |  21  ----------------------------<  82  4.1   |  19<L>  |  1.67<H>  08-06    140  |  106  |  20  ----------------------------<  71  4.6   |  20<L>  |  1.63<H>    Ca    8.9      09 Aug 2018 05:30  Ca    8.6      08 Aug 2018 05:20      CAPILLARY BLOOD GLUCOSE            PTT - ( 08 Aug 2018 20:50 )  PTT:34.6 SEC    D-Dimer Assay, Quantitative: 363 ng/mL (08-07 @ 18:33)  D-Dimer Assay, Quantitative: 510 ng/mL (08-07 @ 09:42)        RECENT CULTURES:        RESPIRATORY CULTURES:      < from: CT Angio Chest w/ IV Cont (08.08.18 @ 13:52) >        INTERPRETATION:  CLINICAL INFORMATION: History of chronic kidney disease.   Evaluate for pulmonary emboli.    COMPARISON: CT scan of chest from 5/12/2015.    PROCEDURE:   CTAngiography of the Chest.  90 ml of Omnipaque 350 was injected intravenously. 10 ml were discarded.  Sagittal and coronal reformats were performed as well as MIPS.    FINDINGS:    CHEST:     PULMONARY ARTERIES: No pulmonary emboli.    LUNGS AND LARGEAIRWAYS: Patent central airways. Bibasilar subsegmental   atelectasis.    PLEURA: No pleural effusion.    VESSELS: Tortuous and ectatic thoracic aorta appears unchanged since May   12, 2015.    HEART: Heart size is within normal limits. No pericardial effusion.    MEDIASTINUM AND MARBELLA: No lymphadenopathy.    CHEST WALL AND LOWER NECK: Lipoma along the medial aspect of the left   scapular wing.    VISUALIZED UPPER ABDOMEN: Nodularity of the left adrenal gland is   unchanged since May 12, 2015.    BONES: Left rib healed fractures. Degenerative spine changes.    IMPRESSION:     No pulmonary emboli.                  CHANDLER JACINTO M.D., RADIOLOGY RESIDENT  This document has been electronically signed.  NICHO ENGLAND M.D. ATTENDING RADIOLOGIST  This document has been electronically signed. Aug  8 2018  3:15PM        < end of copied text >      Studies  Chest X-RAY  CT SCAN Chest   Venous Dopplers: LE:   CT Abdomen  Others

## 2018-08-09 NOTE — PROGRESS NOTE ADULT - PROBLEM SELECTOR PLAN 2
baseline likely 1.5-1.6,   Scr mildly elevated today   elevated PTH, check vit d 25  phos level optimal. baseline likely 1.5-1.6,   elevated PTH, check vit d 25  phos level optimal.

## 2018-08-09 NOTE — PROGRESS NOTE ADULT - PROBLEM SELECTOR PLAN 3
controlled.  monitor BP  low salt diet   continue current meds BP low,   pt to receive IVF   IF BP remains low, consider holding amlodipine   monitor BP

## 2018-08-09 NOTE — PROGRESS NOTE ADULT - SUBJECTIVE AND OBJECTIVE BOX
[facility]   MD NIC DIANA MD ANGELA WONG, PA    TEL:  OFFICE: [phone]  DR THOMPSON CELL: [phone]  DR. PEDRAZA CELL: [phone]  JULIANO SANTORO CELL: [phone]        Patient is a 85y old  Male who presents with a chief complaint of chest pain (04 Aug 2018 22:17)      Patient seen and examined at bedside. No chest pain/sob    VITALS:  T(F): 98.2 (08-09-18 @ 06:10), Max: 98.3 (08-08-18 @ 13:05)  HR: 66 (08-09-18 @ 06:10)  BP: 112/75 (08-09-18 @ 06:10)  RR: 18 (08-09-18 @ 06:10)  SpO2: 100% (08-09-18 @ 06:10)  Wt(kg): --    Height (cm): 172.72 (08-08 @ 17:51)  Weight (kg): 66 (08-08 @ 17:51)  BMI (kg/m2): 22.1 (08-08 @ 17:51)  BSA (m2): 1.79 (08-08 @ 17:51)    PHYSICAL EXAM:  Constitutional: NAD  Neck: No JVD  Respiratory: CTAB, no wheezes, rales or rhonchi  Cardiovascular: S1, S2, RRR  Gastrointestinal: BS+, soft, NT/ND  Extremities: No peripheral edema    Hospital Medications:   MEDICATIONS  (STANDING):  acetylcysteine  Oral Solution 1200 milliGRAM(s) Oral two times a day  amLODIPine   Tablet 10 milliGRAM(s) Oral daily  artificial tears (preservative free) Ophthalmic Solution 1 Drop(s) Left EYE three times a day  aspirin  chewable 81 milliGRAM(s) Oral daily  atorvastatin 20 milliGRAM(s) Oral at bedtime  brimonidine 0.2% Ophthalmic Solution 1 Drop(s) Right EYE two times a day  cycloSPORINE (RESTASIS) 0.05% Emulsion 1 Drop(s) Right EYE two times a day  furosemide    Tablet 20 milliGRAM(s) Oral daily  gabapentin 300 milliGRAM(s) Oral daily  latanoprost 0.005% Ophthalmic Solution 1 Drop(s) Right EYE at bedtime  pantoprazole    Tablet 40 milliGRAM(s) Oral before breakfast  tamsulosin 0.4 milliGRAM(s) Oral at bedtime  timolol 0.5% Solution 1 Drop(s) Right EYE two times a day      LABS:  08-09    139  |  105  |  22  ----------------------------<  76  4.1   |  21<L>  |  1.81<H>    Ca    8.9      09 Aug 2018 05:30      Creatinine Trend: 1.81 <--, 1.74 <--, 1.67 <--, 1.63 <--, 1.70 <--, 1.89 <--                                11.8   3.93  )-----------( 140      ( 09 Aug 2018 05:30 )             34.6     Urine Studies:      PTH 86.67 (Ca --)      [08-07-18 @ 06:18]   --  HbA1c 5.2      [08-05-18 @ 06:40]  TSH 2.01      [08-05-18 @ 06:40]  Lipid: chol 180, TG 63, HDL 55,       [08-05-18 @ 06:40]        RADIOLOGY & ADDITIONAL STUDIES: [facility] [place] [facility]   MD NIC DIANA MD ANGELA WONG, PA    TEL:  OFFICE: [phone]  DR THOMPSON CELL: [phone]  DR. PEDRAZA CELL: [phone]  JULIANO SANTORO CELL: [phone]        Patient is a 85y old  Male who presents with a chief complaint of chest pain (04 Aug 2018 22:17)   s/p CTA on 8/8 suggestive of no PE>     Patient seen and examined at bedside. No chest pain/sob    VITALS:  T(F): 98.2 (08-09-18 @ 06:10), Max: 98.3 (08-08-18 @ 13:05)  HR: 66 (08-09-18 @ 06:10)  BP: 112/75 (08-09-18 @ 06:10)  RR: 18 (08-09-18 @ 06:10)  SpO2: 100% (08-09-18 @ 06:10)  Wt(kg): --    Height (cm): 172.72 (08-08 @ 17:51)  Weight (kg): 66 (08-08 @ 17:51)  BMI (kg/m2): 22.1 (08-08 @ 17:51)  BSA (m2): 1.79 (08-08 @ 17:51)    PHYSICAL EXAM:  Constitutional: NAD  Neck: No JVD  Respiratory: CTAB, no wheezes, rales or rhonchi  Cardiovascular: S1, S2, RRR  Gastrointestinal: BS+, soft, NT/ND  Extremities: No peripheral edema    Hospital Medications:   MEDICATIONS  (STANDING):  acetylcysteine  Oral Solution 1200 milliGRAM(s) Oral two times a day  amLODIPine   Tablet 10 milliGRAM(s) Oral daily  artificial tears (preservative free) Ophthalmic Solution 1 Drop(s) Left EYE three times a day  aspirin  chewable 81 milliGRAM(s) Oral daily  atorvastatin 20 milliGRAM(s) Oral at bedtime  brimonidine 0.2% Ophthalmic Solution 1 Drop(s) Right EYE two times a day  cycloSPORINE (RESTASIS) 0.05% Emulsion 1 Drop(s) Right EYE two times a day  furosemide    Tablet 20 milliGRAM(s) Oral daily  gabapentin 300 milliGRAM(s) Oral daily  latanoprost 0.005% Ophthalmic Solution 1 Drop(s) Right EYE at bedtime  pantoprazole    Tablet 40 milliGRAM(s) Oral before breakfast  tamsulosin 0.4 milliGRAM(s) Oral at bedtime  timolol 0.5% Solution 1 Drop(s) Right EYE two times a day      LABS:  08-09    139  |  105  |  22  ----------------------------<  76  4.1   |  21<L>  |  1.81<H>    Ca    8.9      09 Aug 2018 05:30      Creatinine Trend: 1.81 <--, 1.74 <--, 1.67 <--, 1.63 <--, 1.70 <--, 1.89 <--                                11.8   3.93  )-----------( 140      ( 09 Aug 2018 05:30 )             34.6     Urine Studies:      PTH 86.67 (Ca --)      [08-07-18 @ 06:18]   --  HbA1c 5.2      [08-05-18 @ 06:40]  TSH 2.01      [08-05-18 @ 06:40]  Lipid: chol 180, TG 63, HDL 55,       [08-05-18 @ 06:40]        RADIOLOGY & ADDITIONAL STUDIES:

## 2018-08-09 NOTE — PROGRESS NOTE ADULT - PROBLEM SELECTOR PLAN 1
pt has intermediate prob vq scan: Has lasrge mismatched defect: hence was started on heparin: if OK with nephrology : to do CTA: to adequately rule out PE : His chest x-ray has been clear:  8/8: cta is pending: has been ordered: tolerating heparin at this time: wait for cta results  8/9: cta is [id number]: HAS LEFT RIB FRACTURES: He has been doing well asn clinically has improved:

## 2018-08-09 NOTE — PROGRESS NOTE ADULT - PROBLEM SELECTOR PLAN 1
Renal function worsen today likely sec to contrast, had CTA done 8/8   continue to monitor for now  avoid nephrotoxic agents. FRANK  likely sec to contrast, had CTA done 8/8   Renal function worsening   Pt also with low BP now, SBP ~ 80's, receiving NS   continue to monitor for now  avoid nephrotoxic agents.

## 2018-08-09 NOTE — PROGRESS NOTE ADULT - PROBLEM SELECTOR PLAN 4
f/u cardio/pulm, planning for CTA r/o PE currently on hep gtt.   ok from renal stand point for CTA, renal function relatively stable   please give mucomyst 1200 bid for 4 doses start 1 day prior to test, last dose after test, sodium bicarb 150meq/L in sterile water at 200cc/hr x 1 hr 1hr prior to test then 75cc/hr x 6 hr after. f/u cardio/pulm,   CTA negative for PE

## 2018-08-10 ENCOUNTER — TRANSCRIPTION ENCOUNTER (OUTPATIENT)
Age: 83
End: 2018-08-10

## 2018-08-10 VITALS — SYSTOLIC BLOOD PRESSURE: 109 MMHG | DIASTOLIC BLOOD PRESSURE: 66 MMHG | HEART RATE: 77 BPM

## 2018-08-10 LAB
BASOPHILS # BLD AUTO: 0.02 K/UL — SIGNIFICANT CHANGE UP (ref 0–0.2)
BASOPHILS NFR BLD AUTO: 0.6 % — SIGNIFICANT CHANGE UP (ref 0–2)
BUN SERPL-MCNC: 26 MG/DL — HIGH (ref 7–23)
CALCIUM SERPL-MCNC: 8.8 MG/DL — SIGNIFICANT CHANGE UP (ref 8.4–10.5)
CHLORIDE SERPL-SCNC: 107 MMOL/L — SIGNIFICANT CHANGE UP (ref 98–107)
CO2 SERPL-SCNC: 21 MMOL/L — LOW (ref 22–31)
CORTIS SERPL-MCNC: 8.2 UG/DL — SIGNIFICANT CHANGE UP (ref 2.7–18.4)
CREAT SERPL-MCNC: 2.04 MG/DL — HIGH (ref 0.5–1.3)
EOSINOPHIL # BLD AUTO: 0.19 K/UL — SIGNIFICANT CHANGE UP (ref 0–0.5)
EOSINOPHIL NFR BLD AUTO: 5.6 % — SIGNIFICANT CHANGE UP (ref 0–6)
GLUCOSE SERPL-MCNC: 106 MG/DL — HIGH (ref 70–99)
HCT VFR BLD CALC: 34.3 % — LOW (ref 39–50)
HCT VFR BLD CALC: 34.3 % — LOW (ref 39–50)
HGB BLD-MCNC: 11.9 G/DL — LOW (ref 13–17)
HGB BLD-MCNC: 11.9 G/DL — LOW (ref 13–17)
IMM GRANULOCYTES # BLD AUTO: 0.01 # — SIGNIFICANT CHANGE UP
IMM GRANULOCYTES NFR BLD AUTO: 0.3 % — SIGNIFICANT CHANGE UP (ref 0–1.5)
LYMPHOCYTES # BLD AUTO: 1.01 K/UL — SIGNIFICANT CHANGE UP (ref 1–3.3)
LYMPHOCYTES # BLD AUTO: 29.5 % — SIGNIFICANT CHANGE UP (ref 13–44)
MCHC RBC-ENTMCNC: 28.8 PG — SIGNIFICANT CHANGE UP (ref 27–34)
MCHC RBC-ENTMCNC: 28.8 PG — SIGNIFICANT CHANGE UP (ref 27–34)
MCHC RBC-ENTMCNC: 34.7 % — SIGNIFICANT CHANGE UP (ref 32–36)
MCHC RBC-ENTMCNC: 34.7 % — SIGNIFICANT CHANGE UP (ref 32–36)
MCV RBC AUTO: 83.1 FL — SIGNIFICANT CHANGE UP (ref 80–100)
MCV RBC AUTO: 83.1 FL — SIGNIFICANT CHANGE UP (ref 80–100)
MONOCYTES # BLD AUTO: 0.43 K/UL — SIGNIFICANT CHANGE UP (ref 0–0.9)
MONOCYTES NFR BLD AUTO: 12.6 % — SIGNIFICANT CHANGE UP (ref 2–14)
NEUTROPHILS # BLD AUTO: 1.76 K/UL — LOW (ref 1.8–7.4)
NEUTROPHILS NFR BLD AUTO: 51.4 % — SIGNIFICANT CHANGE UP (ref 43–77)
NRBC # FLD: 0 — SIGNIFICANT CHANGE UP
NRBC # FLD: 0 — SIGNIFICANT CHANGE UP
PLATELET # BLD AUTO: 142 K/UL — LOW (ref 150–400)
PLATELET # BLD AUTO: 142 K/UL — LOW (ref 150–400)
PMV BLD: 11.9 FL — SIGNIFICANT CHANGE UP (ref 7–13)
PMV BLD: 11.9 FL — SIGNIFICANT CHANGE UP (ref 7–13)
POTASSIUM SERPL-MCNC: 4.5 MMOL/L — SIGNIFICANT CHANGE UP (ref 3.5–5.3)
POTASSIUM SERPL-SCNC: 4.5 MMOL/L — SIGNIFICANT CHANGE UP (ref 3.5–5.3)
RBC # BLD: 4.13 M/UL — LOW (ref 4.2–5.8)
RBC # BLD: 4.13 M/UL — LOW (ref 4.2–5.8)
RBC # FLD: 14.7 % — HIGH (ref 10.3–14.5)
RBC # FLD: 14.7 % — HIGH (ref 10.3–14.5)
SODIUM SERPL-SCNC: 141 MMOL/L — SIGNIFICANT CHANGE UP (ref 135–145)
WBC # BLD: 3.42 K/UL — LOW (ref 3.8–10.5)
WBC # BLD: 3.42 K/UL — LOW (ref 3.8–10.5)
WBC # FLD AUTO: 3.42 K/UL — LOW (ref 3.8–10.5)
WBC # FLD AUTO: 3.42 K/UL — LOW (ref 3.8–10.5)

## 2018-08-10 RX ORDER — GABAPENTIN 400 MG/1
1 CAPSULE ORAL
Qty: 0 | Refills: 0 | COMMUNITY
Start: 2018-08-10

## 2018-08-10 RX ORDER — DUREZOL 0.5 MG/ML
1 EMULSION OPHTHALMIC
Qty: 0 | Refills: 0 | COMMUNITY

## 2018-08-10 RX ORDER — TIMOLOL 0.5 %
1 DROPS OPHTHALMIC (EYE)
Qty: 0 | Refills: 0 | COMMUNITY
Start: 2018-08-10

## 2018-08-10 RX ORDER — BIMATOPROST 0.3 MG/ML
1 SOLUTION/ DROPS OPHTHALMIC
Qty: 0 | Refills: 0 | COMMUNITY

## 2018-08-10 RX ORDER — SODIUM CHLORIDE 5 %
1 DROPS OPHTHALMIC (EYE)
Qty: 0 | Refills: 0 | COMMUNITY

## 2018-08-10 RX ORDER — CYCLOSPORINE 0.5 MG/ML
1 EMULSION OPHTHALMIC
Qty: 0 | Refills: 0 | COMMUNITY

## 2018-08-10 RX ORDER — GABAPENTIN 400 MG/1
1 CAPSULE ORAL
Qty: 0 | Refills: 0 | COMMUNITY

## 2018-08-10 RX ORDER — FUROSEMIDE 40 MG
1 TABLET ORAL
Qty: 0 | Refills: 0 | COMMUNITY
Start: 2018-08-10

## 2018-08-10 RX ORDER — BRIMONIDINE TARTRATE 2 MG/MG
1 SOLUTION/ DROPS OPHTHALMIC
Qty: 0 | Refills: 0 | COMMUNITY
Start: 2018-08-10

## 2018-08-10 RX ORDER — CYCLOSPORINE 0.5 MG/ML
1 EMULSION OPHTHALMIC
Qty: 0 | Refills: 0 | COMMUNITY
Start: 2018-08-10

## 2018-08-10 RX ORDER — LATANOPROST 0.05 MG/ML
1 SOLUTION/ DROPS OPHTHALMIC; TOPICAL
Qty: 0 | Refills: 0 | COMMUNITY
Start: 2018-08-10

## 2018-08-10 RX ORDER — SODIUM BICARBONATE 1 MEQ/ML
1 SYRINGE (ML) INTRAVENOUS
Qty: 0 | Refills: 0 | COMMUNITY
Start: 2018-08-10

## 2018-08-10 RX ORDER — BRIMONIDINE TARTRATE, TIMOLOL MALEATE 2; 5 MG/ML; MG/ML
1 SOLUTION/ DROPS OPHTHALMIC
Qty: 0 | Refills: 0 | COMMUNITY

## 2018-08-10 RX ORDER — FUROSEMIDE 40 MG
1 TABLET ORAL
Qty: 0 | Refills: 0 | COMMUNITY

## 2018-08-10 RX ADMIN — Medication 650 MILLIGRAM(S): at 05:32

## 2018-08-10 RX ADMIN — GABAPENTIN 300 MILLIGRAM(S): 400 CAPSULE ORAL at 12:17

## 2018-08-10 RX ADMIN — Medication 1 DROP(S): at 12:18

## 2018-08-10 RX ADMIN — Medication 1 DROP(S): at 17:19

## 2018-08-10 RX ADMIN — CYCLOSPORINE 1 DROP(S): 0.5 EMULSION OPHTHALMIC at 05:34

## 2018-08-10 RX ADMIN — PANTOPRAZOLE SODIUM 40 MILLIGRAM(S): 20 TABLET, DELAYED RELEASE ORAL at 05:34

## 2018-08-10 RX ADMIN — Medication 81 MILLIGRAM(S): at 12:17

## 2018-08-10 RX ADMIN — Medication 650 MILLIGRAM(S): at 12:17

## 2018-08-10 RX ADMIN — BRIMONIDINE TARTRATE 1 DROP(S): 2 SOLUTION/ DROPS OPHTHALMIC at 05:33

## 2018-08-10 RX ADMIN — Medication 1 DROP(S): at 05:33

## 2018-08-10 RX ADMIN — CYCLOSPORINE 1 DROP(S): 0.5 EMULSION OPHTHALMIC at 17:19

## 2018-08-10 RX ADMIN — Medication 1200 MILLIGRAM(S): at 05:32

## 2018-08-10 RX ADMIN — Medication 1 DROP(S): at 05:32

## 2018-08-10 RX ADMIN — BRIMONIDINE TARTRATE 1 DROP(S): 2 SOLUTION/ DROPS OPHTHALMIC at 17:19

## 2018-08-10 RX ADMIN — HEPARIN SODIUM 5000 UNIT(S): 5000 INJECTION INTRAVENOUS; SUBCUTANEOUS at 12:18

## 2018-08-10 RX ADMIN — HEPARIN SODIUM 5000 UNIT(S): 5000 INJECTION INTRAVENOUS; SUBCUTANEOUS at 05:34

## 2018-08-10 NOTE — PROGRESS NOTE ADULT - PROBLEM SELECTOR PLAN 2
baseline likely 1.5-1.6,   elevated PTH, vit d 25 low, start vit d 50000 x8 wks  phos level optimal.

## 2018-08-10 NOTE — PROGRESS NOTE ADULT - PROBLEM SELECTOR PROBLEM 3
Hypertension

## 2018-08-10 NOTE — PROGRESS NOTE ADULT - PROBLEM SELECTOR PLAN 3
Blood pressure seems stable:  8/8: Blood pressure seems softer!  8/9: Stable  8/10: Hemodynamically stable

## 2018-08-10 NOTE — PROGRESS NOTE ADULT - SUBJECTIVE AND OBJECTIVE BOX
[facility] [place] [facility]   MD NIC DIANA MD ANGELA WONG, PA    TEL:  OFFICE: [phone]  DR THOMPSON CELL: [phone]  DR. PEDRAZA CELL: [phone]  JULIANO SANTORO CELL: [phone]        Patient is a 85y old  Male who presents with a chief complaint of chest pain (04 Aug 2018 22:17)      Patient seen and examined at bedside. No chest pain/sob    VITALS:  T(F): 98.2 (08-10-18 @ 11:08), Max: 98.3 (08-10-18 @ 05:30)  HR: 70 (08-10-18 @ 11:08)  BP: 106/73 (08-10-18 @ 11:08)  RR: 18 (08-10-18 @ 11:08)  SpO2: 100% (08-10-18 @ 11:08)  Wt(kg): --        PHYSICAL EXAM:  Constitutional: NAD  Neck: No JVD  Respiratory: CTAB, no wheezes, rales or rhonchi  Cardiovascular: S1, S2, RRR  Gastrointestinal: BS+, soft, NT/ND  Extremities: No peripheral edema    Hospital Medications:   MEDICATIONS  (STANDING):  artificial tears (preservative free) Ophthalmic Solution 1 Drop(s) Left EYE three times a day  aspirin  chewable 81 milliGRAM(s) Oral daily  atorvastatin 20 milliGRAM(s) Oral at bedtime  brimonidine 0.2% Ophthalmic Solution 1 Drop(s) Right EYE two times a day  cycloSPORINE (RESTASIS) 0.05% Emulsion 1 Drop(s) Right EYE two times a day  gabapentin 300 milliGRAM(s) Oral daily  heparin  Injectable 5000 Unit(s) SubCutaneous every 8 hours  latanoprost 0.005% Ophthalmic Solution 1 Drop(s) Right EYE at bedtime  pantoprazole    Tablet 40 milliGRAM(s) Oral before breakfast  sodium bicarbonate 650 milliGRAM(s) Oral three times a day  sodium chloride 0.9%. 1000 milliLiter(s) (60 mL/Hr) IV Continuous <Continuous>  tamsulosin 0.4 milliGRAM(s) Oral at bedtime  timolol 0.5% Solution 1 Drop(s) Right EYE two times a day      LABS:  08-10    141  |  107  |  26<H>  ----------------------------<  106<H>  4.5   |  21<L>  |  2.04<H>    Ca    8.8      10 Aug 2018 05:15      Creatinine Trend: 2.04 <--, 1.81 <--, 1.74 <--, 1.67 <--, 1.63 <--, 1.70 <--, 1.89 <--                                11.9   3.42  )-----------( 142      ( 10 Aug 2018 05:15 )             34.3     Urine Studies:    Urine Creatinine 119.60      [08-09-18 @ 20:36]  Urine Protein 14.5      [08-09-18 @ 20:36]    PTH 86.67 (Ca --)      [08-07-18 @ 06:18]   --  Vitamin D (25OH) 12.7      [08-09-18 @ 05:30]  HbA1c 5.2      [08-05-18 @ 06:40]  TSH 2.01      [08-05-18 @ 06:40]  Lipid: chol 180, TG 63, HDL 55,       [08-05-18 @ 06:40]        RADIOLOGY & ADDITIONAL STUDIES:

## 2018-08-10 NOTE — PROGRESS NOTE ADULT - PROBLEM SELECTOR PROBLEM 5
GERD (Gastroesophageal Reflux Disease)
Need for prophylactic measure
GERD (Gastroesophageal Reflux Disease)
Need for prophylactic measure
GERD (Gastroesophageal Reflux Disease)
GERD (Gastroesophageal Reflux Disease)
Need for prophylactic measure
Need for prophylactic measure
Proteinuria
GERD (Gastroesophageal Reflux Disease)
Proteinuria

## 2018-08-10 NOTE — DISCHARGE NOTE ADULT - COMMUNITY RESOURCES
[facility] [address] [address]; [phone] via [facility] [phone]

## 2018-08-10 NOTE — DISCHARGE NOTE ADULT - CARE PLAN
Principal Discharge DX:	Chest pain, unspecified type  Goal:	Resolved  Assessment and plan of treatment:	CTPA negative for PE  CXR clear  Echo shows Normal LVSF with EF of 65%  Secondary Diagnosis:	GERD (Gastroesophageal Reflux Disease)  Goal:	Control  Assessment and plan of treatment:	Continue protonix  Secondary Diagnosis:	Glaucoma  Goal:	Maintain optimal vision  Assessment and plan of treatment:	Continue latanoprost and brimonidine, restasis  Secondary Diagnosis:	Stage 3 chronic kidney disease  Goal:	Maintain optimal kidney function  Assessment and plan of treatment:	Continue lasix and flomax

## 2018-08-10 NOTE — PROGRESS NOTE ADULT - PROBLEM SELECTOR PROBLEM 1
Chest pain, unspecified type
FRANK (acute kidney injury)
Chest pain, unspecified type
FRANK (acute kidney injury)
Chest pain, unspecified type

## 2018-08-10 NOTE — PROGRESS NOTE ADULT - SUBJECTIVE AND OBJECTIVE BOX
Patient is a 85y old  Male who presents with a chief complaint of chest pain (04 Aug 2018 22:17)      Any change in ROS: Pt is alert and awake: no OSB : no resp distress:       MEDICATIONS  (STANDING):  artificial tears (preservative free) Ophthalmic Solution 1 Drop(s) Left EYE three times a day  aspirin  chewable 81 milliGRAM(s) Oral daily  atorvastatin 20 milliGRAM(s) Oral at bedtime  brimonidine 0.2% Ophthalmic Solution 1 Drop(s) Right EYE two times a day  cycloSPORINE (RESTASIS) 0.05% Emulsion 1 Drop(s) Right EYE two times a day  gabapentin 300 milliGRAM(s) Oral daily  heparin  Injectable 5000 Unit(s) SubCutaneous every 8 hours  latanoprost 0.005% Ophthalmic Solution 1 Drop(s) Right EYE at bedtime  pantoprazole    Tablet 40 milliGRAM(s) Oral before breakfast  sodium bicarbonate 650 milliGRAM(s) Oral three times a day  sodium chloride 0.9%. 1000 milliLiter(s) (60 mL/Hr) IV Continuous <Continuous>  tamsulosin 0.4 milliGRAM(s) Oral at bedtime  timolol 0.5% Solution 1 Drop(s) Right EYE two times a day    MEDICATIONS  (PRN):  acetaminophen   Tablet. 650 milliGRAM(s) Oral every 6 hours PRN mild and moderate pain    Vital Signs Last 24 Hrs  T(C): 36.8 (10 Aug 2018 11:08), Max: 36.8 (10 Aug 2018 05:30)  T(F): 98.2 (10 Aug 2018 11:08), Max: 98.3 (10 Aug 2018 05:30)  HR: 70 (10 Aug 2018 11:08) (63 - 77)  BP: 106/73 (10 Aug 2018 11:08) (83/60 - 108/76)  BP(mean): --  RR: 18 (10 Aug 2018 11:08) (18 - 18)  SpO2: 100% (10 Aug 2018 11:08) (100% - 100%)    I&O's Summary        Physical Exam:   GENERAL: NAD, well-groomed, well-developed  HEENT: HARSHIL/   Atraumatic, Normocephalic  ENMT: No tonsillar erythema, exudates, or enlargement; Moist mucous membranes, Good dentition, No lesions  NECK: Supple, No JVD, Normal thyroid  CHEST/LUNG: Clear to auscultaion, ; No rales, rhonchi, wheezing, or rubs  CVS: Regular rate and rhythm; No murmurs, rubs, or gallops  GI: : Soft, Nontender, Nondistended; Bowel sounds present  NERVOUS SYSTEM:  Alert & Awake  EXTREMITIES:  2+ Peripheral Pulses, No clubbing, cyanosis, or edema  LYMPH: No lymphadenopathy noted  SKIN: No rashes or lesions  ENDOCRINOLOGY: No Thyromegaly  PSYCH: calm    Labs:                              11.9   3.42  )-----------( 142      ( 10 Aug 2018 05:15 )             34.3                         11.8   3.93  )-----------( 140      ( 09 Aug 2018 05:30 )             34.6                         11.9   3.85  )-----------( 136      ( 08 Aug 2018 05:20 )             34.4                         11.3   4.06  )-----------( 134      ( 07 Aug 2018 06:18 )             33.6                         12.3   4.21  )-----------( 126      ( 07 Aug 2018 00:30 )             35.8     08-10    141  |  107  |  26<H>  ----------------------------<  106<H>  4.5   |  21<L>  |  2.04<H>  08-09    139  |  105  |  22  ----------------------------<  76  4.1   |  21<L>  |  1.81<H>  08-08    140  |  106  |  22  ----------------------------<  88  4.1   |  21<L>  |  1.74<H>  08-07    139  |  105  |  21  ----------------------------<  82  4.1   |  19<L>  |  1.67<H>    Ca    8.8      10 Aug 2018 05:15  Ca    8.9      09 Aug 2018 05:30      CAPILLARY BLOOD GLUCOSE            PTT - ( 08 Aug 2018 20:50 )  PTT:34.6 SEC    D-Dimer Assay, Quantitative: 363 ng/mL (08-07 @ 18:33)  D-Dimer Assay, Quantitative: 510 ng/mL (08-07 @ 09:42)        RECENT CULTURES:        RESPIRATORY CULTURES:      < from: CT Angio Chest w/ IV Cont (08.08.18 @ 13:52) >  COMPARISON: CT scan of chest from 5/12/2015.    PROCEDURE:   CTAngiography of the Chest.  90 ml of Omnipaque 350 was injected intravenously. 10 ml were discarded.  Sagittal and coronal reformats were performed as well as MIPS.    FINDINGS:    CHEST:     PULMONARY ARTERIES: No pulmonary emboli.    LUNGS AND LARGEAIRWAYS: Patent central airways. Bibasilar subsegmental   atelectasis.    PLEURA: No pleural effusion.    VESSELS: Tortuous and ectatic thoracic aorta appears unchanged since May   12, 2015.    HEART: Heart size is within normal limits. No pericardial effusion.    MEDIASTINUM AND MARBELLA: No lymphadenopathy.    CHEST WALL AND LOWER NECK: Lipoma along the medial aspect of the left   scapular wing.    VISUALIZED UPPER ABDOMEN: Nodularity of the left adrenal gland is   unchanged since May 12, 2015.    BONES: Left rib healed fractures. Degenerative spine changes.    IMPRESSION:     No pulmonary emboli.                  CHANDLER JACINTO M.D., RADIOLOGY RESIDENT  This document has been electronically signed.  NICHO ENGLAND M.D. ATTENDING RADIOLOGIST  This document has been electronically signed. Aug  8 2018  3:15PM        < end of copied text >      Studies  Chest X-RAY  CT SCAN Chest   Venous Dopplers: LE:   CT Abdomen  Others

## 2018-08-10 NOTE — DISCHARGE NOTE ADULT - CARE PROVIDERS DIRECT ADDRESSES
,DirectAddress_Unknown,DirectAddress_Unknown,[email or web address],DirectAddress_Unknown

## 2018-08-10 NOTE — PROGRESS NOTE ADULT - PROBLEM SELECTOR PROBLEM 2
CKD (chronic kidney disease)
Stage 3 chronic kidney disease
CKD (chronic kidney disease)
Stage 3 chronic kidney disease
CKD (chronic kidney disease)

## 2018-08-10 NOTE — PROGRESS NOTE ADULT - PROBLEM SELECTOR PLAN 1
FRANK  likely sec to contrast, had CTA done 8/8   Renal function worsening   Pt also with low BP now, SBP ~ 80's, yesterday, s/p NS, now improving  hold norvasc and lasix   check bladder scan r/o obstruction. pt is incontinent   continue to monitor for now  avoid nephrotoxic agents.

## 2018-08-10 NOTE — DISCHARGE NOTE ADULT - PATIENT PORTAL LINK FT
You can access the [email or web address], offered by [facility], by registering with the following website: [email or web address]

## 2018-08-10 NOTE — PROGRESS NOTE ADULT - ASSESSMENT
84 y/o M with h/o dementia ( A and O X1-2 at baseline), HTN, CVA, CKD with residual left sided weakness, back pain, CKD, anxiety presents to the ED for chest pain
84 y/o M with h/o dementia (baseline A and O X1-2), HTN, CVA with residual left sided weakness, back pain , CKD, presents to the ED for chest pain. Admit to telemetry to r/o ACS
84 y/o M with h/o dementia (baseline A and O X1-2), HTN, CVA with residual left sided weakness, back pain , CKD, presents to the ED for chest pain. Admit to telemetry to r/o ACS
86 y/o M with h/o dementia ( A and O X1-2 at baseline), HTN, CVA, CKD with residual left sided weakness, back pain, CKD, anxiety presents to the ED for chest pain
86 y/o M with h/o dementia ( A and O X1-2 at baseline), HTN, CVA, CKD with residual left sided weakness, back pain, CKD, anxiety presents to the ED for chest pain
86 y/o M with h/o dementia (baseline A and O X1-2), HTN, CVA with residual left sided weakness, back pain , CKD, presents to the ED for chest pain. Admit to telemetry to r/o ACS
86 y/o M with h/o dementia (baseline A and O X1-2), HTN, CVA with residual left sided weakness, back pain , CKD, presents to the ED for chest pain. Admit to telemetry to r/o ACS
84 y/o M with h/o dementia ( A and O X1-2 at baseline), HTN, CVA, CKD with residual left sided weakness, back pain, CKD, anxiety presents to the ED for chest pain
84 y/o M with h/o dementia (baseline A and O X1-2), HTN, CVA with residual left sided weakness, back pain , CKD, presents to the ED for chest pain. Admit to telemetry to r/o ACS
86 y/o M with h/o dementia (baseline A and O X1-2), HTN, CVA with residual left sided weakness, back pain , CKD, presents to the ED for chest pain. Admit to telemetry to r/o ACS
86 y/o M with h/o dementia (baseline A and O X1-2), HTN, CVA with residual left sided weakness, back pain , CKD, presents to the ED for chest pain. Admit to telemetry to r/o ACS
84 y/o M with h/o dementia (baseline A and O X1-2), HTN, CVA with residual left sided weakness, back pain , CKD, presents to the ED for chest pain. Admit to telemetry to r/o ACS
84 y/o M with h/o dementia (baseline A and O X1-2), HTN, CVA with residual left sided weakness, back pain , CKD, presents to the ED for chest pain. Admit to telemetry to r/o ACS

## 2018-08-10 NOTE — PROGRESS NOTE ADULT - SUBJECTIVE AND OBJECTIVE BOX
chief complaint : chest pain        SUBJECTIVE / OVERNIGHT EVENTS: pt denies chest pain, shortness of breath, nausea, v    MEDICATIONS  (STANDING):  artificial tears (preservative free) Ophthalmic Solution 1 Drop(s) Left EYE three times a day  aspirin  chewable 81 milliGRAM(s) Oral daily  atorvastatin 20 milliGRAM(s) Oral at bedtime  brimonidine 0.2% Ophthalmic Solution 1 Drop(s) Right EYE two times a day  cycloSPORINE (RESTASIS) 0.05% Emulsion 1 Drop(s) Right EYE two times a day  gabapentin 300 milliGRAM(s) Oral daily  heparin  Injectable 5000 Unit(s) SubCutaneous every 8 hours  latanoprost 0.005% Ophthalmic Solution 1 Drop(s) Right EYE at bedtime  pantoprazole    Tablet 40 milliGRAM(s) Oral before breakfast  sodium bicarbonate 650 milliGRAM(s) Oral three times a day  sodium chloride 0.9%. 1000 milliLiter(s) (60 mL/Hr) IV Continuous <Continuous>  tamsulosin 0.4 milliGRAM(s) Oral at bedtime  timolol 0.5% Solution 1 Drop(s) Right EYE two times a day    MEDICATIONS  (PRN):  acetaminophen   Tablet. 650 milliGRAM(s) Oral every 6 hours PRN mild and moderate pain    Vital Signs Last 24 Hrs  T(C): 36.8 (10 Aug 2018 11:08), Max: 36.8 (10 Aug 2018 05:30)  T(F): 98.2 (10 Aug 2018 11:08), Max: 98.3 (10 Aug 2018 05:30)  HR: 77 (10 Aug 2018 17:30) (70 - 77)  BP: 109/66 (10 Aug 2018 17:30) (106/73 - 109/66)  BP(mean): --  RR: 18 (10 Aug 2018 11:08) (18 - 18)  SpO2: 100% (10 Aug 2018 11:08) (100% - 100%)    Constitutional: No fever, fatigue  Skin: No rash.  Eyes: No recent vision problems or eye pain.  ENT: No congestion, ear pain, or sore throat.  Cardiovascular: No chest pain or palpation.  Respiratory: No cough, shortness of breath, congestion, or wheezing.  Gastrointestinal: No abdominal pain, nausea, vomiting, or diarrhea.  Genitourinary: No dysuria.  Musculoskeletal: No joint swelling.  Neurologic: No headache.    PHYSICAL EXAM:  GENERAL: NAD  EYES: EOMI, PERRLA  NECK: Supple, No JVD  CHEST/LUNG: dec breath sounds at bases   HEART:  S1 , S2 +  ABDOMEN: soft, bs+  EXTREMITIES:  edema+  NEUROLOGY: alert awake oriented     LABS:  08-10    141  |  107  |  26<H>  ----------------------------<  106<H>  4.5   |  21<L>  |  2.04<H>    Ca    8.8      10 Aug 2018 05:15      Creatinine Trend: 2.04 <--, 1.81 <--, 1.74 <--, 1.67 <--, 1.63 <--, 1.70 <--, 1.89 <--                        11.9   3.42  )-----------( 142      ( 10 Aug 2018 05:15 )             34.3     Urine Studies:    Protein, Random Urine: 14.5 mg/dL (08-09 @ 20:36)  Creatinine, Random Urine: 119.60 mg/dL (08-09 @ 20:36)

## 2018-08-10 NOTE — DISCHARGE NOTE ADULT - MEDICATION SUMMARY - MEDICATIONS TO TAKE
I will START or STAY ON the medications listed below when I get home from the hospital:    aspirin 81 mg oral tablet, chewable  -- 1 tab(s) by mouth once a day  -- Indication: For Chest pain    sodium bicarbonate 650 mg oral tablet  -- 1 tab(s) by mouth 3 times a day  -- Indication: For CKD (chronic kidney disease)    tamsulosin 0.4 mg oral capsule  -- 1 cap(s) by mouth once a day (at bedtime)  -- Indication: For CKD (chronic kidney disease)    gabapentin 300 mg oral capsule  -- 1 cap(s) by mouth once a day  -- Indication: For Chest pain    atorvastatin 20 mg oral tablet  -- 1 tab(s) by mouth once a day (at bedtime)  -- Indication: For Hypercholesterol    ocular lubricant ophthalmic solution  -- 1 drop(s) to each affected eye 3 times a day  -- Indication: For Glaucoma    latanoprost 0.005% ophthalmic solution  -- 1 drop(s) to each affected eye once a day (at bedtime)  -- Indication: For Glaucoma    timolol maleate 0.5% ophthalmic solution  -- 1 drop(s) to each affected eye 2 times a day  -- Indication: For Glaucoma    brimonidine 0.2% ophthalmic solution  -- 1 drop(s) to each affected eye 2 times a day  -- Indication: For Glaucoma    cycloSPORINE 0.05% ophthalmic emulsion  -- 1 drop(s) to each affected eye 2 times a day  -- Indication: For Glaucoma    pantoprazole 40 mg oral delayed release tablet  -- 1 tab(s) by mouth once a day  -- Indication: For GERD (Gastroesophageal Reflux Disease) I will START or STAY ON the medications listed below when I get home from the hospital:    aspirin 81 mg oral tablet, chewable  -- 1 tab(s) by mouth once a day  -- Indication: For Chest pain    sodium bicarbonate 650 mg oral tablet  -- 1 tab(s) by mouth 3 times a day  -- Indication: For Acidosis    tamsulosin 0.4 mg oral capsule  -- 1 cap(s) by mouth once a day (at bedtime)  -- Indication: For CKD (chronic kidney disease)    gabapentin 300 mg oral capsule  -- 1 cap(s) by mouth once a day  -- Indication: For Chest pain    atorvastatin 20 mg oral tablet  -- 1 tab(s) by mouth once a day (at bedtime)  -- Indication: For Hypercholesterol    furosemide 20 mg oral tablet  -- 1 tab(s) by mouth once a day  -- Indication: For Stage 3 chronic kidney disease    ocular lubricant ophthalmic solution  -- 1 drop(s) to each affected eye 3 times a day  -- Indication: For Glaucoma    latanoprost 0.005% ophthalmic solution  -- 1 drop(s) to each affected eye once a day (at bedtime)  -- Indication: For Glaucoma    timolol maleate 0.5% ophthalmic solution  -- 1 drop(s) to each affected eye 2 times a day  -- Indication: For Glaucoma    brimonidine 0.2% ophthalmic solution  -- 1 drop(s) to each affected eye 2 times a day  -- Indication: For Glaucoma    cycloSPORINE 0.05% ophthalmic emulsion  -- 1 drop(s) to each affected eye 2 times a day  -- Indication: For Glaucoma    pantoprazole 40 mg oral delayed release tablet  -- 1 tab(s) by mouth once a day  -- Indication: For GERD (Gastroesophageal Reflux Disease)

## 2018-08-10 NOTE — PROGRESS NOTE ADULT - PROBLEM SELECTOR PROBLEM 4
Glaucoma
Chest pain, unspecified type
Glaucoma
Chest pain, unspecified type
Glaucoma
Glaucoma

## 2018-08-10 NOTE — PROGRESS NOTE ADULT - PROBLEM SELECTOR PLAN 2
defer to renal:  8/9: monitor kidney functions following CTA  8/10: uptrending creatinine: Defer to renal

## 2018-08-10 NOTE — PROGRESS NOTE ADULT - NSHPATTENDINGPLANDISCUSS_GEN_ALL_CORE
eugenio ASENCIO/ CHADWICK calderon/[MD
card PA
eugenio ASENCIO/ CHADWICK calderon
eugenio ASENCIO/ CHADWICK calderon/[MD

## 2018-08-10 NOTE — PROGRESS NOTE ADULT - PROBLEM SELECTOR PLAN 5
Continue with pantoprazole.
on iv heparin anyway  8/9L? iv heparin dced: in sq heparin
Continue with pantoprazole.
on iv heparin anyway
Continue with pantoprazole.
Continue with pantoprazole.
mild, SPEP, SIF, K/L, hep b, hep C neg, possible sec to HTN. w/u done 12/2017  check urine protein/cr ratio.
on iv heparin anyway
on iv heparin anyway  8/9L? iv heparin dced: in sq heparin
Continue with pantoprazole.
mild, SPEP, SIF, K/L, hep b, hep C neg, possible sec to HTN. w/u done 12/2017  urine p/c 121mg/day

## 2018-08-10 NOTE — PROGRESS NOTE ADULT - PROBLEM SELECTOR PROBLEM 6
Acidosis
Need for prophylactic measure
Acidosis
Need for prophylactic measure
Need for prophylactic measure

## 2018-08-10 NOTE — PROGRESS NOTE ADULT - ATTENDING COMMENTS
8/8: await CTA  8*9: CTA negative
8/8: await CTA
8/8: await CTA  8*9: CTA negative  8/10: no pulmonary issues: Only has bibasilar subsegmental atelectasis: Will sign off: If needed again, please call Dr Antoine for follow up

## 2018-08-10 NOTE — DISCHARGE NOTE ADULT - HOSPITAL COURSE
· Assessment		  86 y/o M with h/o dementia (baseline A and O X1-2), HTN, CVA with residual left sided weakness, back pain , CKD, presents to the ED for chest pain. Admited to telemetry to r/o ACS which was negative.    Problem/Plan - 1:  ·  Problem: Chest pain, unspecified type.  Plan: pt is chest pain free  cont diuresis with lasix   cta chest neg for pE     Problem/Plan - 2:  ·  Problem: CKD (chronic kidney disease).  Plan: ~1.8 - 2, pt does not remember if he sees a nephrologist.  Renal function relatively stable    Problem/Plan - 3:  ·  Problem: Hypertension.  Plan: cont current htn med regimen.     Problem/Plan - 4:  ·  Problem: Glaucoma.  Plan: Continue with current eye drops.     Problem/Plan - 5:  ·  Problem: GERD (Gastroesophageal Reflux Disease).  Plan: Continue with pantoprazole.

## 2018-08-10 NOTE — PROGRESS NOTE ADULT - PROVIDER SPECIALTY LIST ADULT
Internal Medicine
Internal Medicine
Nephrology
Pulmonology
Pulmonology
Internal Medicine
Nephrology
Internal Medicine
Pulmonology
Pulmonology
Internal Medicine

## 2018-08-10 NOTE — DISCHARGE NOTE ADULT - PLAN OF CARE
Resolved CTPA negative for PE  CXR clear  Echo shows Normal LVSF with EF of 65% Control Continue protonix Maintain optimal vision Continue latanoprost and brimonidine, restasis Maintain optimal kidney function Continue lasix and flomax

## 2018-08-10 NOTE — DISCHARGE NOTE ADULT - ADDITIONAL INSTRUCTIONS
Follow up with the provider at rehab on arrival Follow up with the provider at rehab on arrival  Please do a BMP in 3-5 days Follow up with the provider at rehab on arrival  Please do a BMP in 3-5 days.

## 2018-08-10 NOTE — PROGRESS NOTE ADULT - PROBLEM SELECTOR PLAN 1
pt has intermediate prob vq scan: Has lasrge mismatched defect: hence was started on heparin: if OK with nephrology : to do CTA: to adequately rule out PE : His chest x-ray has been clear:  8/8: cta is pending: has been ordered: tolerating heparin at this time: wait for cta results  8/9: cta is [id number]: HAS LEFT RIB FRACTURES: He has been doing well asn clinically has improved:  8/10: Resolved: no PE : ct chest iss fine

## 2019-01-03 NOTE — PATIENT PROFILE ADULT. - AS [facility] [employer]
January 3, 2019      Re:   Harinder Curtis  [address]  [facility] [address]         To Whom It May Concern:      Harinder Curtis is a patient of mine. He is having flu-like symptoms as well as sciatic nerve pain flare. I have advised him that it is not in his best interest to travel at this time and that he should cancel any pending travel plans. My office can be contacted for questions or concerns [phone].      Sincerely,           Guru Sullivan MD  [address]  [address]  [facility] [address]   (2) very limited

## 2020-01-01 NOTE — ED PROVIDER NOTE - CPE EDP RESP NORM
ID bands checked. Infant's ID band and Mother's matching ID bands removed and taped to discharge instruction sheet, the mother verified as correct and witnessed by RN. Umbilical clamp and HUGS tag removed. Mom and  Infant discharged via wheelchair to private car. Infant placed in car seat per parents. Mom and baby accompanied by family and in stable condition.
- - -

## 2020-07-20 NOTE — PROGRESS NOTE ADULT - SUBJECTIVE AND OBJECTIVE BOX
RN spoke to patient regarding her symptoms. 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 Patient should still see opthalmologist and have them forward the exam to us for review. Patient is no longer taking ASA daily. Patient will follow up with neurology and eye doctor and send us updates as completed.    chief complaint : chest pain        SUBJECTIVE / OVERNIGHT EVENTS: pt denies chest pain, shortness of breath, nausea, v    MEDICATIONS  (STANDING):  acetylcysteine  Oral Solution 1200 milliGRAM(s) Oral two times a day  artificial tears (preservative free) Ophthalmic Solution 1 Drop(s) Left EYE three times a day  aspirin  chewable 81 milliGRAM(s) Oral daily  atorvastatin 20 milliGRAM(s) Oral at bedtime  brimonidine 0.2% Ophthalmic Solution 1 Drop(s) Right EYE two times a day  cycloSPORINE (RESTASIS) 0.05% Emulsion 1 Drop(s) Right EYE two times a day  furosemide    Tablet 20 milliGRAM(s) Oral daily  gabapentin 300 milliGRAM(s) Oral daily  heparin  Injectable 5000 Unit(s) SubCutaneous every 8 hours  latanoprost 0.005% Ophthalmic Solution 1 Drop(s) Right EYE at bedtime  pantoprazole    Tablet 40 milliGRAM(s) Oral before breakfast  sodium bicarbonate 650 milliGRAM(s) Oral three times a day  sodium chloride 0.9%. 1000 milliLiter(s) (60 mL/Hr) IV Continuous <Continuous>  tamsulosin 0.4 milliGRAM(s) Oral at bedtime  timolol 0.5% Solution 1 Drop(s) Right EYE two times a day    MEDICATIONS  (PRN):  acetaminophen   Tablet. 650 milliGRAM(s) Oral every 6 hours PRN mild and moderate pain    Vital Signs Last 24 Hrs  T(C): 36.7 (09 Aug 2018 13:00), Max: 36.8 (09 Aug 2018 06:10)  T(F): 98.1 (09 Aug 2018 13:00), Max: 98.2 (09 Aug 2018 06:10)  HR: 77 (09 Aug 2018 17:18) (66 - 77)  BP: 101/70 (09 Aug 2018 17:18) (83/60 - 112/75)  BP(mean): --  RR: 18 (09 Aug 2018 13:00) (18 - 18)  SpO2: 100% (09 Aug 2018 13:00) (100% - 100%)    Constitutional: No fever, fatigue  Skin: No rash.  Eyes: No recent vision problems or eye pain.  ENT: No congestion, ear pain, or sore throat.  Cardiovascular: No chest pain or palpation.  Respiratory: No cough, shortness of breath, congestion, or wheezing.  Gastrointestinal: No abdominal pain, nausea, vomiting, or diarrhea.  Genitourinary: No dysuria.  Musculoskeletal: No joint swelling.  Neurologic: No headache.    PHYSICAL EXAM:  GENERAL: NAD  EYES: EOMI, PERRLA  NECK: Supple, No JVD  CHEST/LUNG: dec breath sounds at bases   HEART:  S1 , S2 +  ABDOMEN: soft, bs+  EXTREMITIES:  edema+  NEUROLOGY: alert awake oriented     LABS:  08-09    139  |  105  |  22  ----------------------------<  76  4.1   |  21<L>  |  1.81<H>    Ca    8.9      09 Aug 2018 05:30      Creatinine Trend: 1.81 <--, 1.74 <--, 1.67 <--, 1.63 <--, 1.70 <--, 1.89 <--                        11.8   3.93  )-----------( 140      ( 09 Aug 2018 05:30 )             34.6     Urine Studies:              PTT - ( 08 Aug 2018 20:50 )  PTT:34.6 SEC

## 2020-09-22 NOTE — DISCHARGE NOTE ADULT - CARE PROVIDER_API CALL
Detail Level: Detailed Deejay Montalvo ([facility]), Cardiology  [address] [address] [place] [address]  Phone: [phone]  Fax: [phone]    Noah Watts), Urology  [address] [place] [address]  Phone: [phone]  Fax: [phone] Deejay Montalvo ([facility]), Cardiology  [address] [place] [address]  Phone: [phone]  Fax: [phone]    Aneudy Juan), Urology  [address] [place] [address]  Phone: [phone]  Fax: [phone]

## 2020-12-17 NOTE — ED ADULT TRIAGE NOTE - PRO INTERPRETER NEED 2
English Partial Purse String (Simple) Text: Given the location of the defect and the characteristics of the surrounding skin a simple purse string closure was deemed most appropriate.  Undermining was performed circumfirentially around the surgical defect.  A purse string suture was then placed and tightened. Wound tension only allowed a partial closure of the circular defect.

## 2021-09-28 NOTE — H&P ADULT - PROBLEM/PLAN-1
Dear Ally,     My name is KIRT Burnett, and I recently had the pleasure of evaluating information related to your medical condition on our [employer] digital platform. Based on the information available, I have determined that your request for care was unable to be safely and effectively completed. Due to this, it is my recommendation that you seek care at a local Urgent Care or Immediate Care Center for an expedited and thorough face-to-face evaluation and consideration for diagnostic testing.     Please note that the [employer] department that you submitted your request for care is equivalent to a virtual format of Urgent Care or Immediate Care level of care. If you did not intend to seek this particular level of care and potentially had a scheduled appointment with another provider, we recommend that you directly call the office of the provider you are attempting to meet with to rectify connection issues.     Your care is very important to us. Please do not delay in acting on the recommendations for your care listed above. If you feel you may be experiencing a medical emergency, contact 911 immediately. If you have any questions regarding your visit, you may contact us at [phone].     Thank You,     KIRT Burnett   
DISPLAY PLAN FREE TEXT

## 2021-11-01 NOTE — DISCHARGE NOTE ADULT - NSFTFSERV2RD_GEN_ALL_CORE
Spoke with patient he has questions regarding insurances as his wife is going to be on Medicare and they have been on her insurance. He currently has a choice between a [facility] HMO and [employer]. Discussed the HMO plan and to make sure Dr Raymond is in the plan otherwise he may need to switch providers. Also pointed out he will be on Medicare in 6 months. Patient acknowledged an understanding.   Physical Therapy/Nursing

## 2022-04-26 NOTE — PHYSICAL THERAPY INITIAL EVALUATION ADULT - SITTING BALANCE: DYNAMIC
Chief Complaint   Patient presents with   • Office Visit     hyperthyroidism       Very pleasant 48 yo male here for follow up of hyperthyroidism     Just on a very low dose methimazole 5mg po every other day      TSH (mcUnits/mL)   Date Value   02/03/2022 1.349       T4, Free (ng/dL)   Date Value   02/03/2022 1.0         Constitutional negative  Respiratory problem(s):  Negative  Cardiovascular problem(s):  Negative  Hematologic problem(s):  Negative  Immunologic Problem(s):  Negative  Gastrointestinal problem(s):  Negative  Genitourinary problem(s):  Negative  Musculoskeletal problem(s):  Negative  Neurological problem(s):  Negative  Endocrine problem(s):  Negative  Skin problem(s):  Negative      Visit Vitals  /60   Pulse 60   Wt 88.9 kg (196 lb)   SpO2 100%   BMI 30.70 kg/m²       PHYSICAL EXAM:  General: alert, in no acute distress  Skin: warm with normal turgor  Head: atraumatic and normocephalic  Neck: supple with no significant adenopathy, no thyromegaly  Lungs: clear to auscultation, no wheezing or rhonchi noted  Heart: regular rhythm, no murmur present  Abdomen: soft, no guarding or masses, no organomegaly or CVA tenderness      Hyperthyroidism  (primary encounter diagnosis)    Continue current dose of methimazole  Labs today and in 3 months    Methimazole high risk medication   Agranulocytosis risk discussed      RTC in 6 months   poor balance

## 2022-09-11 NOTE — SWALLOW BEDSIDE ASSESSMENT ADULT - ASR SWALLOW ASPIRATION MONITOR
fever/cough/position upright (90Y)/throat clearing/pneumonia/upper respiratory infection/oral hygiene/gurgly voice/change of breathing pattern supervision/contact guard

## 2022-09-29 NOTE — ED ADULT NURSE NOTE - NSHISCREENINGQ1_ED_A_ED
[Initial Consultation] : an initial consultation for [Blood Count Assessment] : blood count assessment [FreeTextEntry2] : Length of anticoagulation therapy No

## 2022-10-03 NOTE — ED ADULT NURSE NOTE - TEMPLATE LIST FOR HEAD TO TOE ASSESSMENT

## 2023-01-30 NOTE — ED ADULT TRIAGE NOTE - MODE OF ARRIVAL
What Is The Reason For Today's Visit?: History of Melanoma Breslow Depth?: 0.3 Year Excised?: 206 EMS

## 2023-06-30 NOTE — ED ADULT NURSE NOTE - PAIN RATING/NUMBER SCALE (0-10): REST
Mom called asking for a refill on epipen sent to [facility] on [address]. Moms phone number is [phone].    5

## 2024-02-02 NOTE — ED ADULT NURSE NOTE - HEART RATE (BEATS/MIN)
It is okay to refill  Lyrica 200 mg 3 times per day  
Pt is requesting refill of   pregabalin 200 mg 1 capsule TID [facility]                                                        LV:  12/13/23                  NV:  6/11/24                  Pended RX to CANDICE Gongora for transmission to pharmacy.   
79

## 2024-10-07 NOTE — PROGRESS NOTE ADULT - PROBLEM SELECTOR PLAN 4
Trying atorvastatin    f/u cardio/pulm, planning for CTA r/o PE currently on hep gtt.   ok from renal stand point for CTA, renal function relatively stable   please give mucomyst 1200 bid for 4 doses start 1 day prior to test, last dose after test, sodium bicarb 150meq/L in sterile water at 200cc/hr x 1 hr 1hr prior to test then 75cc/hr x 6 hr after.

## 2025-03-26 NOTE — ED ADULT NURSE NOTE - PT NEEDS ASSIST
no Pt will improve LB dressing and footwear management using hip kit by 1 grade in 1 week. Pt will improve toileting by 1 grade in 1 week.

## 2025-04-09 NOTE — PROVIDER CONTACT NOTE (CRITICAL VALUE NOTIFICATION) - NAME OF MD/NP/PA/DO NOTIFIED:
Patient seen for adhd ov 06/03/2024  Scheduled annual ov 09/15/2025  Rx filled 03/20/2025  To provider please advise.   amphetamine-dextroamphetamine (ADDERALL XR) 30 MG 24 hr capsule         Sig: Take 1 capsule by mouth daily.    Disp: 30 capsule    Refills: 0    Start: 4/9/2025    Earliest Fill Date: 4/9/2025    Class: Eprescribe    PDMP Review May Be Needed    Non-formulary    To pharmacy: Collaborating/Supervising MD is Dr. Huang Emmanuel MD    Last ordered: 3 weeks ago (3/13/2025) by Terri Fraser PA-C    Controlled Substances Refill Protocol - 12 Month Protocol - ALWAYS forward to ordering provider [email or web address]/09/2025 08:43 AM   Protocol Details FORWARD TO PROVIDER - Refill requests for these medications must ALWAYS be forwarded to the ordering provider, even if all criteria are met    PDMP has been reviewed in last 24 hours    Urine/serum drug screen on file in the appropriate time frame    Active/up-to-date controlled substances agreement/consent on file    Medication (including dose and sig) on current med list    Seen by prescribing provider or same department within the last 6 months or has a future appt in 6 months - IF FAILED PLEASE LOOK AT CHART REVIEW FOR LAST VISIT AND PROCEED ACCORDINGLY      To be filled at: [id number] - [place] - [id number]      Johny Chaudhry
